# Patient Record
Sex: MALE | Employment: OTHER | URBAN - METROPOLITAN AREA
[De-identification: names, ages, dates, MRNs, and addresses within clinical notes are randomized per-mention and may not be internally consistent; named-entity substitution may affect disease eponyms.]

---

## 2017-01-03 ENCOUNTER — AMBULATORY - HEALTHEAST (OUTPATIENT)
Dept: CARDIAC REHAB | Facility: CLINIC | Age: 67
End: 2017-01-03

## 2017-01-03 DIAGNOSIS — I21.4 NON-ST ELEVATION MI (NSTEMI) (H): ICD-10-CM

## 2017-01-03 DIAGNOSIS — I21.02 ST ELEVATION MYOCARDIAL INFARCTION INVOLVING LEFT ANTERIOR DESCENDING (LAD) CORONARY ARTERY (H): ICD-10-CM

## 2017-01-03 DIAGNOSIS — I42.2 HYPERTROPHIC CARDIOMYOPATHY (H): ICD-10-CM

## 2017-01-03 DIAGNOSIS — I50.32 CHRONIC DIASTOLIC CONGESTIVE HEART FAILURE (H): ICD-10-CM

## 2017-01-03 DIAGNOSIS — Z95.5 STENTED CORONARY ARTERY: ICD-10-CM

## 2017-01-05 ENCOUNTER — AMBULATORY - HEALTHEAST (OUTPATIENT)
Dept: CARDIAC REHAB | Facility: CLINIC | Age: 67
End: 2017-01-05

## 2017-01-05 DIAGNOSIS — I50.32 CHRONIC DIASTOLIC CONGESTIVE HEART FAILURE (H): ICD-10-CM

## 2017-01-05 DIAGNOSIS — Z95.5 STENTED CORONARY ARTERY: ICD-10-CM

## 2017-01-05 DIAGNOSIS — I21.4 NON-ST ELEVATION MI (NSTEMI) (H): ICD-10-CM

## 2017-01-10 ENCOUNTER — AMBULATORY - HEALTHEAST (OUTPATIENT)
Dept: CARDIAC REHAB | Facility: CLINIC | Age: 67
End: 2017-01-10

## 2017-01-10 DIAGNOSIS — I50.32 CHRONIC DIASTOLIC CONGESTIVE HEART FAILURE (H): ICD-10-CM

## 2017-01-10 DIAGNOSIS — I21.4 NON-ST ELEVATION MI (NSTEMI) (H): ICD-10-CM

## 2017-01-10 DIAGNOSIS — Z95.5 STENTED CORONARY ARTERY: ICD-10-CM

## 2017-01-12 ENCOUNTER — AMBULATORY - HEALTHEAST (OUTPATIENT)
Dept: CARDIAC REHAB | Facility: CLINIC | Age: 67
End: 2017-01-12

## 2017-01-12 DIAGNOSIS — I21.4 NON-ST ELEVATION MI (NSTEMI) (H): ICD-10-CM

## 2017-01-12 DIAGNOSIS — Z95.5 STENTED CORONARY ARTERY: ICD-10-CM

## 2017-01-12 DIAGNOSIS — I50.32 CHRONIC DIASTOLIC CONGESTIVE HEART FAILURE (H): ICD-10-CM

## 2017-01-17 ENCOUNTER — AMBULATORY - HEALTHEAST (OUTPATIENT)
Dept: CARDIAC REHAB | Facility: CLINIC | Age: 67
End: 2017-01-17

## 2017-01-17 DIAGNOSIS — Z95.5 STENTED CORONARY ARTERY: ICD-10-CM

## 2017-01-17 DIAGNOSIS — I21.4 NON-ST ELEVATION MI (NSTEMI) (H): ICD-10-CM

## 2017-01-17 DIAGNOSIS — I50.32 CHRONIC DIASTOLIC CONGESTIVE HEART FAILURE (H): ICD-10-CM

## 2017-01-19 ENCOUNTER — AMBULATORY - HEALTHEAST (OUTPATIENT)
Dept: CARDIAC REHAB | Facility: CLINIC | Age: 67
End: 2017-01-19

## 2017-01-19 DIAGNOSIS — I42.2 HYPERTROPHIC CARDIOMYOPATHY (H): ICD-10-CM

## 2017-01-19 DIAGNOSIS — I21.4 NON-ST ELEVATION MI (NSTEMI) (H): ICD-10-CM

## 2017-01-19 DIAGNOSIS — Z95.5 STENTED CORONARY ARTERY: ICD-10-CM

## 2017-01-24 ENCOUNTER — AMBULATORY - HEALTHEAST (OUTPATIENT)
Dept: CARDIAC REHAB | Facility: CLINIC | Age: 67
End: 2017-01-24

## 2017-01-24 DIAGNOSIS — I50.32 CHRONIC DIASTOLIC CONGESTIVE HEART FAILURE (H): ICD-10-CM

## 2017-01-24 DIAGNOSIS — I42.2 HYPERTROPHIC CARDIOMYOPATHY (H): ICD-10-CM

## 2017-01-24 DIAGNOSIS — I21.4 NON-ST ELEVATION MI (NSTEMI) (H): ICD-10-CM

## 2017-01-24 DIAGNOSIS — Z95.5 STENTED CORONARY ARTERY: ICD-10-CM

## 2017-01-24 DIAGNOSIS — I21.02 ST ELEVATION MYOCARDIAL INFARCTION INVOLVING LEFT ANTERIOR DESCENDING (LAD) CORONARY ARTERY (H): ICD-10-CM

## 2017-01-26 ENCOUNTER — AMBULATORY - HEALTHEAST (OUTPATIENT)
Dept: CARDIAC REHAB | Facility: CLINIC | Age: 67
End: 2017-01-26

## 2017-01-26 DIAGNOSIS — I50.32 CHRONIC DIASTOLIC CONGESTIVE HEART FAILURE (H): ICD-10-CM

## 2017-01-26 DIAGNOSIS — I21.4 NON-ST ELEVATION MI (NSTEMI) (H): ICD-10-CM

## 2017-01-26 DIAGNOSIS — I42.2 HYPERTROPHIC CARDIOMYOPATHY (H): ICD-10-CM

## 2017-01-26 DIAGNOSIS — Z95.5 STENTED CORONARY ARTERY: ICD-10-CM

## 2017-01-31 ENCOUNTER — AMBULATORY - HEALTHEAST (OUTPATIENT)
Dept: CARDIAC REHAB | Facility: CLINIC | Age: 67
End: 2017-01-31

## 2017-01-31 DIAGNOSIS — I21.02 ST ELEVATION MYOCARDIAL INFARCTION INVOLVING LEFT ANTERIOR DESCENDING (LAD) CORONARY ARTERY (H): ICD-10-CM

## 2017-01-31 DIAGNOSIS — I42.2 HYPERTROPHIC CARDIOMYOPATHY (H): ICD-10-CM

## 2017-01-31 DIAGNOSIS — I21.4 NON-ST ELEVATION MI (NSTEMI) (H): ICD-10-CM

## 2017-01-31 DIAGNOSIS — Z95.5 STENTED CORONARY ARTERY: ICD-10-CM

## 2017-01-31 DIAGNOSIS — I50.32 CHRONIC DIASTOLIC CONGESTIVE HEART FAILURE (H): ICD-10-CM

## 2017-02-02 ENCOUNTER — AMBULATORY - HEALTHEAST (OUTPATIENT)
Dept: CARDIAC REHAB | Facility: CLINIC | Age: 67
End: 2017-02-02

## 2017-02-02 DIAGNOSIS — Z95.5 STENTED CORONARY ARTERY: ICD-10-CM

## 2017-02-02 DIAGNOSIS — I21.4 NON-ST ELEVATION MI (NSTEMI) (H): ICD-10-CM

## 2017-02-02 DIAGNOSIS — I50.32 CHRONIC DIASTOLIC CONGESTIVE HEART FAILURE (H): ICD-10-CM

## 2017-02-07 ENCOUNTER — AMBULATORY - HEALTHEAST (OUTPATIENT)
Dept: CARDIAC REHAB | Facility: CLINIC | Age: 67
End: 2017-02-07

## 2017-02-07 DIAGNOSIS — I50.32 CHRONIC DIASTOLIC CONGESTIVE HEART FAILURE (H): ICD-10-CM

## 2017-02-07 DIAGNOSIS — I42.2 HYPERTROPHIC CARDIOMYOPATHY (H): ICD-10-CM

## 2017-02-07 DIAGNOSIS — Z95.5 STENTED CORONARY ARTERY: ICD-10-CM

## 2017-02-07 DIAGNOSIS — I21.4 NON-ST ELEVATION MI (NSTEMI) (H): ICD-10-CM

## 2017-02-09 ENCOUNTER — AMBULATORY - HEALTHEAST (OUTPATIENT)
Dept: CARDIAC REHAB | Facility: CLINIC | Age: 67
End: 2017-02-09

## 2017-02-09 DIAGNOSIS — I21.4 NON-ST ELEVATION MI (NSTEMI) (H): ICD-10-CM

## 2017-02-09 DIAGNOSIS — Z95.5 STENTED CORONARY ARTERY: ICD-10-CM

## 2017-02-09 DIAGNOSIS — I50.32 CHRONIC DIASTOLIC CONGESTIVE HEART FAILURE (H): ICD-10-CM

## 2017-02-14 ENCOUNTER — AMBULATORY - HEALTHEAST (OUTPATIENT)
Dept: CARDIAC REHAB | Facility: CLINIC | Age: 67
End: 2017-02-14

## 2017-02-14 DIAGNOSIS — I42.2 HYPERTROPHIC CARDIOMYOPATHY (H): ICD-10-CM

## 2017-02-14 DIAGNOSIS — I50.32 CHRONIC DIASTOLIC CONGESTIVE HEART FAILURE (H): ICD-10-CM

## 2017-02-14 DIAGNOSIS — Z95.5 STENTED CORONARY ARTERY: ICD-10-CM

## 2017-02-14 DIAGNOSIS — I21.4 NON-ST ELEVATION MI (NSTEMI) (H): ICD-10-CM

## 2017-02-14 DIAGNOSIS — I21.02 ST ELEVATION MYOCARDIAL INFARCTION INVOLVING LEFT ANTERIOR DESCENDING (LAD) CORONARY ARTERY (H): ICD-10-CM

## 2017-02-16 ENCOUNTER — AMBULATORY - HEALTHEAST (OUTPATIENT)
Dept: CARDIAC REHAB | Facility: CLINIC | Age: 67
End: 2017-02-16

## 2017-02-16 DIAGNOSIS — I50.32 CHRONIC DIASTOLIC CONGESTIVE HEART FAILURE (H): ICD-10-CM

## 2017-02-16 DIAGNOSIS — I21.4 NON-ST ELEVATION MI (NSTEMI) (H): ICD-10-CM

## 2017-02-16 DIAGNOSIS — Z95.5 STENTED CORONARY ARTERY: ICD-10-CM

## 2017-02-16 DIAGNOSIS — I42.2 HYPERTROPHIC CARDIOMYOPATHY (H): ICD-10-CM

## 2017-02-21 ENCOUNTER — AMBULATORY - HEALTHEAST (OUTPATIENT)
Dept: CARDIAC REHAB | Facility: CLINIC | Age: 67
End: 2017-02-21

## 2017-02-21 DIAGNOSIS — I21.4 NON-ST ELEVATION MI (NSTEMI) (H): ICD-10-CM

## 2017-02-21 DIAGNOSIS — Z95.5 STENTED CORONARY ARTERY: ICD-10-CM

## 2017-02-21 DIAGNOSIS — I50.32 CHRONIC DIASTOLIC CONGESTIVE HEART FAILURE (H): ICD-10-CM

## 2017-02-23 ENCOUNTER — AMBULATORY - HEALTHEAST (OUTPATIENT)
Dept: CARDIAC REHAB | Facility: CLINIC | Age: 67
End: 2017-02-23

## 2017-02-23 DIAGNOSIS — I21.4 NON-ST ELEVATION MI (NSTEMI) (H): ICD-10-CM

## 2017-02-23 DIAGNOSIS — Z95.5 STENTED CORONARY ARTERY: ICD-10-CM

## 2017-02-28 ENCOUNTER — AMBULATORY - HEALTHEAST (OUTPATIENT)
Dept: CARDIAC REHAB | Facility: CLINIC | Age: 67
End: 2017-02-28

## 2017-02-28 DIAGNOSIS — I21.4 NON-ST ELEVATION MI (NSTEMI) (H): ICD-10-CM

## 2017-02-28 DIAGNOSIS — Z95.5 STENTED CORONARY ARTERY: ICD-10-CM

## 2017-02-28 DIAGNOSIS — I50.32 CHRONIC DIASTOLIC CONGESTIVE HEART FAILURE (H): ICD-10-CM

## 2017-02-28 DIAGNOSIS — I42.2 HYPERTROPHIC CARDIOMYOPATHY (H): ICD-10-CM

## 2018-02-16 ENCOUNTER — AMBULATORY - HEALTHEAST (OUTPATIENT)
Dept: CARDIAC REHAB | Facility: CLINIC | Age: 68
End: 2018-02-16

## 2018-02-16 DIAGNOSIS — Z95.5 STENTED CORONARY ARTERY: ICD-10-CM

## 2018-03-09 ENCOUNTER — COMMUNICATION - HEALTHEAST (OUTPATIENT)
Dept: TELEHEALTH | Facility: CLINIC | Age: 68
End: 2018-03-09

## 2018-03-09 ENCOUNTER — AMBULATORY - HEALTHEAST (OUTPATIENT)
Dept: CARDIAC REHAB | Facility: CLINIC | Age: 68
End: 2018-03-09

## 2018-03-09 DIAGNOSIS — Z95.5 S/P DRUG ELUTING CORONARY STENT PLACEMENT: ICD-10-CM

## 2018-03-09 DIAGNOSIS — Z95.5 STENTED CORONARY ARTERY: ICD-10-CM

## 2018-03-13 ENCOUNTER — AMBULATORY - HEALTHEAST (OUTPATIENT)
Dept: CARDIAC REHAB | Facility: CLINIC | Age: 68
End: 2018-03-13

## 2018-03-13 DIAGNOSIS — Z95.5 S/P DRUG ELUTING CORONARY STENT PLACEMENT: ICD-10-CM

## 2018-03-13 DIAGNOSIS — I50.30 DIASTOLIC CHF (H): ICD-10-CM

## 2018-03-15 ENCOUNTER — AMBULATORY - HEALTHEAST (OUTPATIENT)
Dept: CARDIAC REHAB | Facility: CLINIC | Age: 68
End: 2018-03-15

## 2018-03-15 DIAGNOSIS — Z95.5 S/P DRUG ELUTING CORONARY STENT PLACEMENT: ICD-10-CM

## 2018-03-27 ENCOUNTER — AMBULATORY - HEALTHEAST (OUTPATIENT)
Dept: CARDIAC REHAB | Facility: CLINIC | Age: 68
End: 2018-03-27

## 2018-03-27 DIAGNOSIS — Z95.5 S/P DRUG ELUTING CORONARY STENT PLACEMENT: ICD-10-CM

## 2018-03-27 DIAGNOSIS — I21.02 ST ELEVATION MYOCARDIAL INFARCTION INVOLVING LEFT ANTERIOR DESCENDING (LAD) CORONARY ARTERY (H): ICD-10-CM

## 2018-03-29 ENCOUNTER — AMBULATORY - HEALTHEAST (OUTPATIENT)
Dept: CARDIAC REHAB | Facility: CLINIC | Age: 68
End: 2018-03-29

## 2018-03-29 DIAGNOSIS — Z95.5 S/P DRUG ELUTING CORONARY STENT PLACEMENT: ICD-10-CM

## 2018-04-03 ENCOUNTER — AMBULATORY - HEALTHEAST (OUTPATIENT)
Dept: CARDIAC REHAB | Facility: CLINIC | Age: 68
End: 2018-04-03

## 2018-04-03 DIAGNOSIS — Z95.5 S/P DRUG ELUTING CORONARY STENT PLACEMENT: ICD-10-CM

## 2019-09-29 ENCOUNTER — HEALTH MAINTENANCE LETTER (OUTPATIENT)
Age: 69
End: 2019-09-29

## 2019-11-04 ENCOUNTER — COMMUNICATION - HEALTHEAST (OUTPATIENT)
Dept: GERIATRICS | Facility: CLINIC | Age: 69
End: 2019-11-04

## 2019-11-04 ENCOUNTER — OFFICE VISIT - HEALTHEAST (OUTPATIENT)
Dept: GERIATRICS | Facility: CLINIC | Age: 69
End: 2019-11-04

## 2019-11-04 ENCOUNTER — AMBULATORY - HEALTHEAST (OUTPATIENT)
Dept: GERIATRICS | Facility: CLINIC | Age: 69
End: 2019-11-04

## 2019-11-04 ENCOUNTER — RECORDS - HEALTHEAST (OUTPATIENT)
Dept: LAB | Facility: CLINIC | Age: 69
End: 2019-11-04

## 2019-11-04 DIAGNOSIS — F41.8 DEPRESSION WITH ANXIETY: ICD-10-CM
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
## 2019-11-04 DIAGNOSIS — E83.42 HYPOMAGNESEMIA: ICD-10-CM

## 2019-11-04 DIAGNOSIS — I50.33 ACUTE ON CHRONIC HEART FAILURE WITH PRESERVED EJECTION FRACTION (H): ICD-10-CM

## 2019-11-04 DIAGNOSIS — T81.49XA WOUND INFECTION AFTER SURGERY: ICD-10-CM

## 2019-11-04 DIAGNOSIS — E87.6 HYPOKALEMIA: ICD-10-CM

## 2019-11-04 DIAGNOSIS — I48.21 PERMANENT ATRIAL FIBRILLATION (H): ICD-10-CM

## 2019-11-04 DIAGNOSIS — M1A.09X0 CHRONIC GOUT OF MULTIPLE SITES, UNSPECIFIED CAUSE: ICD-10-CM

## 2019-11-04 DIAGNOSIS — N18.30 TYPE 2 DIABETES MELLITUS WITH STAGE 3 CHRONIC KIDNEY DISEASE, WITHOUT LONG-TERM CURRENT USE OF INSULIN (H): ICD-10-CM

## 2019-11-04 DIAGNOSIS — G47.33 OSA (OBSTRUCTIVE SLEEP APNEA): ICD-10-CM

## 2019-11-04 DIAGNOSIS — R60.0 BILATERAL EDEMA OF LOWER EXTREMITY: ICD-10-CM

## 2019-11-04 DIAGNOSIS — E78.49 OTHER HYPERLIPIDEMIA: ICD-10-CM

## 2019-11-04 DIAGNOSIS — E66.09 CLASS 1 OBESITY DUE TO EXCESS CALORIES WITH SERIOUS COMORBIDITY AND BODY MASS INDEX (BMI) OF 34.0 TO 34.9 IN ADULT: ICD-10-CM

## 2019-11-04 DIAGNOSIS — E11.22 TYPE 2 DIABETES MELLITUS WITH STAGE 3 CHRONIC KIDNEY DISEASE, WITHOUT LONG-TERM CURRENT USE OF INSULIN (H): ICD-10-CM

## 2019-11-04 DIAGNOSIS — N18.30 CRD (CHRONIC RENAL DISEASE), STAGE III (H): ICD-10-CM

## 2019-11-04 DIAGNOSIS — Z95.5 STENTED CORONARY ARTERY: ICD-10-CM

## 2019-11-04 DIAGNOSIS — K21.9 GASTROESOPHAGEAL REFLUX DISEASE WITHOUT ESOPHAGITIS: ICD-10-CM

## 2019-11-04 DIAGNOSIS — I12.9 BENIGN HYPERTENSION WITH CHRONIC KIDNEY DISEASE, STAGE III (H): ICD-10-CM

## 2019-11-04 DIAGNOSIS — S22.079D CLOSED FRACTURE OF NINTH THORACIC VERTEBRA WITH ROUTINE HEALING, UNSPECIFIED FRACTURE MORPHOLOGY, SUBSEQUENT ENCOUNTER: ICD-10-CM

## 2019-11-04 DIAGNOSIS — N18.30 BENIGN HYPERTENSION WITH CHRONIC KIDNEY DISEASE, STAGE III (H): ICD-10-CM

## 2019-11-04 DIAGNOSIS — I50.21 ACUTE SYSTOLIC HEART FAILURE (H): ICD-10-CM

## 2019-11-04 DIAGNOSIS — R53.81 PHYSICAL DECONDITIONING: ICD-10-CM

## 2019-11-04 DIAGNOSIS — I25.118 ATHEROSCLEROSIS OF NATIVE CORONARY ARTERY OF NATIVE HEART WITH STABLE ANGINA PECTORIS (H): ICD-10-CM

## 2019-11-04 DIAGNOSIS — E66.811 CLASS 1 OBESITY DUE TO EXCESS CALORIES WITH SERIOUS COMORBIDITY AND BODY MASS INDEX (BMI) OF 34.0 TO 34.9 IN ADULT: ICD-10-CM

## 2019-11-04 LAB
ALP SERPL-CCNC: 131 U/L (ref 45–120)
ALT SERPL W P-5'-P-CCNC: <9 U/L (ref 0–45)
AST SERPL W P-5'-P-CCNC: 9 U/L (ref 0–40)
BASOPHILS # BLD AUTO: 0 THOU/UL (ref 0–0.2)
BASOPHILS NFR BLD AUTO: 1 % (ref 0–2)
BILIRUB DIRECT SERPL-MCNC: 0.2 MG/DL
BILIRUB SERPL-MCNC: 0.4 MG/DL (ref 0–1)
BUN SERPL-MCNC: 12 MG/DL (ref 8–22)
C REACTIVE PROTEIN LHE: 6.6 MG/DL (ref 0–0.8)
CREAT SERPL-MCNC: 0.84 MG/DL (ref 0.7–1.3)
EOSINOPHIL # BLD AUTO: 0.2 THOU/UL (ref 0–0.4)
EOSINOPHIL NFR BLD AUTO: 3 % (ref 0–6)
ERYTHROCYTE [DISTWIDTH] IN BLOOD BY AUTOMATED COUNT: 16.1 % (ref 11–14.5)
GFR SERPL CREATININE-BSD FRML MDRD: >60 ML/MIN/1.73M2
HCT VFR BLD AUTO: 32.9 % (ref 40–54)
HGB BLD-MCNC: 9.6 G/DL (ref 14–18)
LYMPHOCYTES # BLD AUTO: 1.2 THOU/UL (ref 0.8–4.4)
LYMPHOCYTES NFR BLD AUTO: 19 % (ref 20–40)
MCH RBC QN AUTO: 26.9 PG (ref 27–34)
MCHC RBC AUTO-ENTMCNC: 29.2 G/DL (ref 32–36)
MCV RBC AUTO: 92 FL (ref 80–100)
MONOCYTES # BLD AUTO: 0.7 THOU/UL (ref 0–0.9)
MONOCYTES NFR BLD AUTO: 10 % (ref 2–10)
NEUTROPHILS # BLD AUTO: 4.5 THOU/UL (ref 2–7.7)
NEUTROPHILS NFR BLD AUTO: 67 % (ref 50–70)
PLATELET # BLD AUTO: 284 THOU/UL (ref 140–440)
PMV BLD AUTO: 10.7 FL (ref 8.5–12.5)
RBC # BLD AUTO: 3.57 MILL/UL (ref 4.4–6.2)
WBC: 6.7 THOU/UL (ref 4–11)

## 2019-11-05 ENCOUNTER — RECORDS - HEALTHEAST (OUTPATIENT)
Dept: LAB | Facility: CLINIC | Age: 69
End: 2019-11-05

## 2019-11-05 LAB
ANION GAP SERPL CALCULATED.3IONS-SCNC: 9 MMOL/L (ref 5–18)
BASOPHILS # BLD AUTO: 0.1 THOU/UL (ref 0–0.2)
BASOPHILS NFR BLD AUTO: 1 % (ref 0–2)
BUN SERPL-MCNC: 11 MG/DL (ref 8–22)
C REACTIVE PROTEIN LHE: 5.5 MG/DL (ref 0–0.8)
CALCIUM SERPL-MCNC: 9.9 MG/DL (ref 8.5–10.5)
CHLORIDE BLD-SCNC: 100 MMOL/L (ref 98–107)
CO2 SERPL-SCNC: 34 MMOL/L (ref 22–31)
CREAT SERPL-MCNC: 0.93 MG/DL (ref 0.7–1.3)
DIGOXIN LEVEL LHE- HISTORICAL: 0.5 NG/ML (ref 0.5–2)
EOSINOPHIL # BLD AUTO: 0.2 THOU/UL (ref 0–0.4)
EOSINOPHIL NFR BLD AUTO: 3 % (ref 0–6)
ERYTHROCYTE [DISTWIDTH] IN BLOOD BY AUTOMATED COUNT: 16.1 % (ref 11–14.5)
GFR SERPL CREATININE-BSD FRML MDRD: >60 ML/MIN/1.73M2
GLUCOSE BLD-MCNC: 90 MG/DL (ref 70–125)
HCT VFR BLD AUTO: 35.6 % (ref 40–54)
HGB BLD-MCNC: 10.3 G/DL (ref 14–18)
INR PPP: 1.08 (ref 0.9–1.1)
LYMPHOCYTES # BLD AUTO: 1.5 THOU/UL (ref 0.8–4.4)
LYMPHOCYTES NFR BLD AUTO: 22 % (ref 20–40)
MAGNESIUM SERPL-MCNC: 1.9 MG/DL (ref 1.8–2.6)
MCH RBC QN AUTO: 26.9 PG (ref 27–34)
MCHC RBC AUTO-ENTMCNC: 28.9 G/DL (ref 32–36)
MCV RBC AUTO: 93 FL (ref 80–100)
MONOCYTES # BLD AUTO: 0.7 THOU/UL (ref 0–0.9)
MONOCYTES NFR BLD AUTO: 11 % (ref 2–10)
NEUTROPHILS # BLD AUTO: 4.3 THOU/UL (ref 2–7.7)
NEUTROPHILS NFR BLD AUTO: 63 % (ref 50–70)
PLATELET # BLD AUTO: 303 THOU/UL (ref 140–440)
PMV BLD AUTO: 10.6 FL (ref 8.5–12.5)
POTASSIUM BLD-SCNC: 3.9 MMOL/L (ref 3.5–5)
RBC # BLD AUTO: 3.83 MILL/UL (ref 4.4–6.2)
SODIUM SERPL-SCNC: 143 MMOL/L (ref 136–145)
WBC: 6.8 THOU/UL (ref 4–11)

## 2019-11-06 LAB — 25(OH)D3 SERPL-MCNC: 31.6 NG/ML (ref 30–80)

## 2019-11-07 ENCOUNTER — OFFICE VISIT - HEALTHEAST (OUTPATIENT)
Dept: GERIATRICS | Facility: CLINIC | Age: 69
End: 2019-11-07

## 2019-11-07 DIAGNOSIS — N18.30 BENIGN HYPERTENSION WITH CHRONIC KIDNEY DISEASE, STAGE III (H): ICD-10-CM

## 2019-11-07 DIAGNOSIS — I50.33 ACUTE ON CHRONIC HEART FAILURE WITH PRESERVED EJECTION FRACTION (H): ICD-10-CM

## 2019-11-07 DIAGNOSIS — E11.22 TYPE 2 DIABETES MELLITUS WITH STAGE 3 CHRONIC KIDNEY DISEASE, WITHOUT LONG-TERM CURRENT USE OF INSULIN (H): ICD-10-CM

## 2019-11-07 DIAGNOSIS — F41.8 DEPRESSION WITH ANXIETY: ICD-10-CM

## 2019-11-07 DIAGNOSIS — E66.09 CLASS 1 OBESITY DUE TO EXCESS CALORIES WITH SERIOUS COMORBIDITY AND BODY MASS INDEX (BMI) OF 34.0 TO 34.9 IN ADULT: ICD-10-CM

## 2019-11-07 DIAGNOSIS — Z98.1 STATUS POST THORACIC SPINAL FUSION: ICD-10-CM

## 2019-11-07 DIAGNOSIS — Z95.5 STENTED CORONARY ARTERY: ICD-10-CM

## 2019-11-07 DIAGNOSIS — R29.6 FALLS FREQUENTLY: ICD-10-CM

## 2019-11-07 DIAGNOSIS — T81.49XA POSTOPERATIVE WOUND INFECTION: ICD-10-CM

## 2019-11-07 DIAGNOSIS — I48.21 PERMANENT ATRIAL FIBRILLATION (H): ICD-10-CM

## 2019-11-07 DIAGNOSIS — N18.30 TYPE 2 DIABETES MELLITUS WITH STAGE 3 CHRONIC KIDNEY DISEASE, WITHOUT LONG-TERM CURRENT USE OF INSULIN (H): ICD-10-CM

## 2019-11-07 DIAGNOSIS — I12.9 BENIGN HYPERTENSION WITH CHRONIC KIDNEY DISEASE, STAGE III (H): ICD-10-CM

## 2019-11-07 DIAGNOSIS — E66.811 CLASS 1 OBESITY DUE TO EXCESS CALORIES WITH SERIOUS COMORBIDITY AND BODY MASS INDEX (BMI) OF 34.0 TO 34.9 IN ADULT: ICD-10-CM

## 2019-11-07 DIAGNOSIS — I89.0 LYMPHEDEMA OF BOTH LOWER EXTREMITIES: ICD-10-CM

## 2019-11-08 ENCOUNTER — RECORDS - HEALTHEAST (OUTPATIENT)
Dept: LAB | Facility: CLINIC | Age: 69
End: 2019-11-08

## 2019-11-10 ENCOUNTER — RECORDS - HEALTHEAST (OUTPATIENT)
Dept: LAB | Facility: CLINIC | Age: 69
End: 2019-11-10

## 2019-11-11 ENCOUNTER — RECORDS - HEALTHEAST (OUTPATIENT)
Dept: LAB | Facility: CLINIC | Age: 69
End: 2019-11-11

## 2019-11-11 ENCOUNTER — OFFICE VISIT - HEALTHEAST (OUTPATIENT)
Dept: GERIATRICS | Facility: CLINIC | Age: 69
End: 2019-11-11

## 2019-11-11 DIAGNOSIS — E11.22 TYPE 2 DIABETES MELLITUS WITH STAGE 3 CHRONIC KIDNEY DISEASE, WITHOUT LONG-TERM CURRENT USE OF INSULIN (H): ICD-10-CM

## 2019-11-11 DIAGNOSIS — N18.30 TYPE 2 DIABETES MELLITUS WITH STAGE 3 CHRONIC KIDNEY DISEASE, WITHOUT LONG-TERM CURRENT USE OF INSULIN (H): ICD-10-CM

## 2019-11-11 DIAGNOSIS — I48.21 PERMANENT ATRIAL FIBRILLATION (H): ICD-10-CM

## 2019-11-11 DIAGNOSIS — N40.1 BPH WITH URINARY OBSTRUCTION: ICD-10-CM

## 2019-11-11 DIAGNOSIS — E78.49 OTHER HYPERLIPIDEMIA: ICD-10-CM

## 2019-11-11 DIAGNOSIS — T81.49XA WOUND INFECTION AFTER SURGERY: ICD-10-CM

## 2019-11-11 DIAGNOSIS — Z95.5 STENTED CORONARY ARTERY: ICD-10-CM

## 2019-11-11 DIAGNOSIS — E83.42 HYPOMAGNESEMIA: ICD-10-CM

## 2019-11-11 DIAGNOSIS — I25.118 ATHEROSCLEROSIS OF NATIVE CORONARY ARTERY OF NATIVE HEART WITH STABLE ANGINA PECTORIS (H): ICD-10-CM

## 2019-11-11 DIAGNOSIS — N18.30 CRD (CHRONIC RENAL DISEASE), STAGE III (H): ICD-10-CM

## 2019-11-11 DIAGNOSIS — I12.9 BENIGN HYPERTENSION WITH CHRONIC KIDNEY DISEASE, STAGE III (H): ICD-10-CM

## 2019-11-11 DIAGNOSIS — K21.9 GASTROESOPHAGEAL REFLUX DISEASE WITHOUT ESOPHAGITIS: ICD-10-CM

## 2019-11-11 DIAGNOSIS — R60.0 BILATERAL EDEMA OF LOWER EXTREMITY: ICD-10-CM

## 2019-11-11 DIAGNOSIS — F41.8 DEPRESSION WITH ANXIETY: ICD-10-CM

## 2019-11-11 DIAGNOSIS — N13.8 BPH WITH URINARY OBSTRUCTION: ICD-10-CM

## 2019-11-11 DIAGNOSIS — I89.0 LYMPHEDEMA OF BOTH LOWER EXTREMITIES: ICD-10-CM

## 2019-11-11 DIAGNOSIS — D63.8 ANEMIA OF CHRONIC DISEASE: ICD-10-CM

## 2019-11-11 DIAGNOSIS — I50.33 ACUTE ON CHRONIC HEART FAILURE WITH PRESERVED EJECTION FRACTION (H): ICD-10-CM
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
## 2019-11-11 DIAGNOSIS — R53.81 PHYSICAL DECONDITIONING: ICD-10-CM

## 2019-11-11 DIAGNOSIS — E66.811 CLASS 1 OBESITY DUE TO EXCESS CALORIES WITH SERIOUS COMORBIDITY AND BODY MASS INDEX (BMI) OF 34.0 TO 34.9 IN ADULT: ICD-10-CM

## 2019-11-11 DIAGNOSIS — L85.3 DRY SKIN: ICD-10-CM

## 2019-11-11 DIAGNOSIS — E87.6 HYPOKALEMIA: ICD-10-CM

## 2019-11-11 DIAGNOSIS — E66.09 CLASS 1 OBESITY DUE TO EXCESS CALORIES WITH SERIOUS COMORBIDITY AND BODY MASS INDEX (BMI) OF 34.0 TO 34.9 IN ADULT: ICD-10-CM

## 2019-11-11 DIAGNOSIS — N18.30 BENIGN HYPERTENSION WITH CHRONIC KIDNEY DISEASE, STAGE III (H): ICD-10-CM

## 2019-11-11 LAB
25(OH)D3 SERPL-MCNC: 41 NG/ML (ref 30–80)
ALP SERPL-CCNC: 131 U/L (ref 45–120)
ALT SERPL W P-5'-P-CCNC: <9 U/L (ref 0–45)
AST SERPL W P-5'-P-CCNC: 15 U/L (ref 0–40)
BASOPHILS # BLD AUTO: 0.1 THOU/UL (ref 0–0.2)
BASOPHILS NFR BLD AUTO: 1 % (ref 0–2)
BILIRUB DIRECT SERPL-MCNC: 0.2 MG/DL
BILIRUB SERPL-MCNC: 0.4 MG/DL (ref 0–1)
BUN SERPL-MCNC: 17 MG/DL (ref 8–22)
C REACTIVE PROTEIN LHE: 4.2 MG/DL (ref 0–0.8)
CREAT SERPL-MCNC: 1 MG/DL (ref 0.7–1.3)
DIGOXIN LEVEL LHE- HISTORICAL: 0.5 NG/ML (ref 0.5–2)
EOSINOPHIL # BLD AUTO: 0.3 THOU/UL (ref 0–0.4)
EOSINOPHIL NFR BLD AUTO: 5 % (ref 0–6)
ERYTHROCYTE [DISTWIDTH] IN BLOOD BY AUTOMATED COUNT: 16 % (ref 11–14.5)
GFR SERPL CREATININE-BSD FRML MDRD: >60 ML/MIN/1.73M2
HBA1C MFR BLD: 6 % (ref 4.2–6.1)
HCT VFR BLD AUTO: 37.5 % (ref 40–54)
HGB BLD-MCNC: 11.1 G/DL (ref 14–18)
LYMPHOCYTES # BLD AUTO: 1.4 THOU/UL (ref 0.8–4.4)
LYMPHOCYTES NFR BLD AUTO: 21 % (ref 20–40)
MAGNESIUM SERPL-MCNC: 1.8 MG/DL (ref 1.8–2.6)
MCH RBC QN AUTO: 27.1 PG (ref 27–34)
MCHC RBC AUTO-ENTMCNC: 29.6 G/DL (ref 32–36)
MCV RBC AUTO: 92 FL (ref 80–100)
MONOCYTES # BLD AUTO: 0.7 THOU/UL (ref 0–0.9)
MONOCYTES NFR BLD AUTO: 10 % (ref 2–10)
NEUTROPHILS # BLD AUTO: 4.1 THOU/UL (ref 2–7.7)
NEUTROPHILS NFR BLD AUTO: 62 % (ref 50–70)
PLATELET # BLD AUTO: 286 THOU/UL (ref 140–440)
PMV BLD AUTO: 11.1 FL (ref 8.5–12.5)
POTASSIUM BLD-SCNC: 3.9 MMOL/L (ref 3.5–5)
RBC # BLD AUTO: 4.09 MILL/UL (ref 4.4–6.2)
WBC: 6.5 THOU/UL (ref 4–11)

## 2019-11-13 ENCOUNTER — RECORDS - HEALTHEAST (OUTPATIENT)
Dept: LAB | Facility: CLINIC | Age: 69
End: 2019-11-13

## 2019-11-13 LAB — INR PPP: 1.09 (ref 0.9–1.1)

## 2019-11-14 ENCOUNTER — OFFICE VISIT - HEALTHEAST (OUTPATIENT)
Dept: GERIATRICS | Facility: CLINIC | Age: 69
End: 2019-11-14

## 2019-11-14 DIAGNOSIS — E83.42 HYPOMAGNESEMIA: ICD-10-CM
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
## 2019-11-14 DIAGNOSIS — I50.33 ACUTE ON CHRONIC HEART FAILURE WITH PRESERVED EJECTION FRACTION (H): ICD-10-CM

## 2019-11-14 DIAGNOSIS — I25.118 ATHEROSCLEROSIS OF NATIVE CORONARY ARTERY OF NATIVE HEART WITH STABLE ANGINA PECTORIS (H): ICD-10-CM

## 2019-11-14 DIAGNOSIS — N18.30 TYPE 2 DIABETES MELLITUS WITH STAGE 3 CHRONIC KIDNEY DISEASE, WITHOUT LONG-TERM CURRENT USE OF INSULIN (H): ICD-10-CM

## 2019-11-14 DIAGNOSIS — I12.9 BENIGN HYPERTENSION WITH CHRONIC KIDNEY DISEASE, STAGE III (H): ICD-10-CM

## 2019-11-14 DIAGNOSIS — N40.1 BPH WITH URINARY OBSTRUCTION: ICD-10-CM

## 2019-11-14 DIAGNOSIS — I48.21 PERMANENT ATRIAL FIBRILLATION (H): ICD-10-CM

## 2019-11-14 DIAGNOSIS — I89.0 LYMPHEDEMA OF BOTH LOWER EXTREMITIES: ICD-10-CM

## 2019-11-14 DIAGNOSIS — E78.49 OTHER HYPERLIPIDEMIA: ICD-10-CM

## 2019-11-14 DIAGNOSIS — N13.8 BPH WITH URINARY OBSTRUCTION: ICD-10-CM

## 2019-11-14 DIAGNOSIS — R60.0 BILATERAL EDEMA OF LOWER EXTREMITY: ICD-10-CM

## 2019-11-14 DIAGNOSIS — G47.33 OSA (OBSTRUCTIVE SLEEP APNEA): ICD-10-CM

## 2019-11-14 DIAGNOSIS — N18.30 BENIGN HYPERTENSION WITH CHRONIC KIDNEY DISEASE, STAGE III (H): ICD-10-CM

## 2019-11-14 DIAGNOSIS — F41.8 DEPRESSION WITH ANXIETY: ICD-10-CM

## 2019-11-14 DIAGNOSIS — N18.30 CRD (CHRONIC RENAL DISEASE), STAGE III (H): ICD-10-CM

## 2019-11-14 DIAGNOSIS — D63.8 ANEMIA OF CHRONIC DISEASE: ICD-10-CM

## 2019-11-14 DIAGNOSIS — R53.81 PHYSICAL DECONDITIONING: ICD-10-CM

## 2019-11-14 DIAGNOSIS — E11.22 TYPE 2 DIABETES MELLITUS WITH STAGE 3 CHRONIC KIDNEY DISEASE, WITHOUT LONG-TERM CURRENT USE OF INSULIN (H): ICD-10-CM

## 2019-11-15 ENCOUNTER — COMMUNICATION - HEALTHEAST (OUTPATIENT)
Dept: GERIATRICS | Facility: CLINIC | Age: 69
End: 2019-11-15

## 2019-11-15 ENCOUNTER — RECORDS - HEALTHEAST (OUTPATIENT)
Dept: LAB | Facility: CLINIC | Age: 69
End: 2019-11-15

## 2019-11-15 LAB
ALP SERPL-CCNC: 145 U/L (ref 45–120)
INR PPP: 1.32 (ref 0.9–1.1)

## 2019-11-16 LAB
ANION GAP SERPL CALCULATED.3IONS-SCNC: 18 MMOL/L (ref 5–18)
BUN SERPL-MCNC: 17 MG/DL (ref 8–22)
CALCIUM SERPL-MCNC: 9 MG/DL (ref 8.5–10.5)
CHLORIDE BLD-SCNC: 101 MMOL/L (ref 98–107)
CO2 SERPL-SCNC: 23 MMOL/L (ref 22–31)
CREAT SERPL-MCNC: 1 MG/DL (ref 0.7–1.3)
GFR SERPL CREATININE-BSD FRML MDRD: >60 ML/MIN/1.73M2
GLUCOSE BLD-MCNC: 79 MG/DL (ref 70–125)
POTASSIUM BLD-SCNC: 4.4 MMOL/L (ref 3.5–5)
SODIUM SERPL-SCNC: 142 MMOL/L (ref 136–145)

## 2019-11-17 LAB
AEROBIC BLOOD CULTURE BOTTLE: NO GROWTH
AEROBIC BLOOD CULTURE BOTTLE: NORMAL
ANAEROBIC BLOOD CULTURE BOTTLE: NO GROWTH
ANAEROBIC BLOOD CULTURE BOTTLE: NORMAL

## 2019-11-18 ENCOUNTER — RECORDS - HEALTHEAST (OUTPATIENT)
Dept: LAB | Facility: CLINIC | Age: 69
End: 2019-11-18

## 2019-11-18 ENCOUNTER — COMMUNICATION - HEALTHEAST (OUTPATIENT)
Dept: GERIATRICS | Facility: CLINIC | Age: 69
End: 2019-11-18

## 2019-11-18 LAB
ANION GAP SERPL CALCULATED.3IONS-SCNC: 10 MMOL/L (ref 5–18)
BASOPHILS # BLD AUTO: 0.1 THOU/UL (ref 0–0.2)
BASOPHILS NFR BLD AUTO: 1 % (ref 0–2)
BUN SERPL-MCNC: 23 MG/DL (ref 8–22)
C REACTIVE PROTEIN LHE: 6.6 MG/DL (ref 0–0.8)
CALCIUM SERPL-MCNC: 10.8 MG/DL (ref 8.5–10.5)
CHLORIDE BLD-SCNC: 96 MMOL/L (ref 98–107)
CO2 SERPL-SCNC: 35 MMOL/L (ref 22–31)
CREAT SERPL-MCNC: 1.28 MG/DL (ref 0.7–1.3)
EOSINOPHIL # BLD AUTO: 0.3 THOU/UL (ref 0–0.4)
EOSINOPHIL NFR BLD AUTO: 5 % (ref 0–6)
ERYTHROCYTE [DISTWIDTH] IN BLOOD BY AUTOMATED COUNT: 16.4 % (ref 11–14.5)
GFR SERPL CREATININE-BSD FRML MDRD: 56 ML/MIN/1.73M2
GLUCOSE BLD-MCNC: 91 MG/DL (ref 70–125)
HCT VFR BLD AUTO: 40.1 % (ref 40–54)
HGB BLD-MCNC: 11.8 G/DL (ref 14–18)
INR PPP: 1.9 (ref 0.9–1.1)
LYMPHOCYTES # BLD AUTO: 1.7 THOU/UL (ref 0.8–4.4)
LYMPHOCYTES NFR BLD AUTO: 29 % (ref 20–40)
MCH RBC QN AUTO: 27.1 PG (ref 27–34)
MCHC RBC AUTO-ENTMCNC: 29.4 G/DL (ref 32–36)
MCV RBC AUTO: 92 FL (ref 80–100)
MONOCYTES # BLD AUTO: 0.5 THOU/UL (ref 0–0.9)
MONOCYTES NFR BLD AUTO: 9 % (ref 2–10)
NEUTROPHILS # BLD AUTO: 3.3 THOU/UL (ref 2–7.7)
NEUTROPHILS NFR BLD AUTO: 56 % (ref 50–70)
PLATELET # BLD AUTO: 324 THOU/UL (ref 140–440)
PMV BLD AUTO: 11.4 FL (ref 8.5–12.5)
POTASSIUM BLD-SCNC: 4.2 MMOL/L (ref 3.5–5)
RBC # BLD AUTO: 4.35 MILL/UL (ref 4.4–6.2)
SODIUM SERPL-SCNC: 141 MMOL/L (ref 136–145)
WBC: 5.8 THOU/UL (ref 4–11)

## 2019-11-19 ENCOUNTER — OFFICE VISIT - HEALTHEAST (OUTPATIENT)
Dept: GERIATRICS | Facility: CLINIC | Age: 69
End: 2019-11-19

## 2019-11-19 ENCOUNTER — RECORDS - HEALTHEAST (OUTPATIENT)
Dept: LAB | Facility: CLINIC | Age: 69
End: 2019-11-19

## 2019-11-19 ENCOUNTER — COMMUNICATION - HEALTHEAST (OUTPATIENT)
Dept: GERIATRICS | Facility: CLINIC | Age: 69
End: 2019-11-19

## 2019-11-19 DIAGNOSIS — S22.079D CLOSED FRACTURE OF NINTH THORACIC VERTEBRA WITH ROUTINE HEALING, UNSPECIFIED FRACTURE MORPHOLOGY, SUBSEQUENT ENCOUNTER: ICD-10-CM

## 2019-11-19 DIAGNOSIS — F41.8 DEPRESSION WITH ANXIETY: ICD-10-CM

## 2019-11-19 DIAGNOSIS — I50.33 ACUTE ON CHRONIC HEART FAILURE WITH PRESERVED EJECTION FRACTION (H): ICD-10-CM

## 2019-11-19 DIAGNOSIS — K21.9 GASTROESOPHAGEAL REFLUX DISEASE WITHOUT ESOPHAGITIS: ICD-10-CM

## 2019-11-19 DIAGNOSIS — G47.33 OSA (OBSTRUCTIVE SLEEP APNEA): ICD-10-CM

## 2019-11-19 DIAGNOSIS — I25.118 ATHEROSCLEROSIS OF NATIVE CORONARY ARTERY OF NATIVE HEART WITH STABLE ANGINA PECTORIS (H): ICD-10-CM

## 2019-11-19 DIAGNOSIS — I48.21 PERMANENT ATRIAL FIBRILLATION (H): ICD-10-CM

## 2019-11-19 DIAGNOSIS — I89.0 LYMPHEDEMA OF BOTH LOWER EXTREMITIES: ICD-10-CM

## 2019-11-19 DIAGNOSIS — T81.49XA WOUND INFECTION AFTER SURGERY: ICD-10-CM

## 2019-11-19 DIAGNOSIS — G89.29 CHRONIC PAIN OF RIGHT KNEE: ICD-10-CM

## 2019-11-19 DIAGNOSIS — M25.561 CHRONIC PAIN OF RIGHT KNEE: ICD-10-CM

## 2019-11-19 DIAGNOSIS — N18.30 CRD (CHRONIC RENAL DISEASE), STAGE III (H): ICD-10-CM

## 2019-11-19 DIAGNOSIS — R53.81 PHYSICAL DECONDITIONING: ICD-10-CM
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
## 2019-11-19 DIAGNOSIS — E11.22 TYPE 2 DIABETES MELLITUS WITH STAGE 3 CHRONIC KIDNEY DISEASE, WITHOUT LONG-TERM CURRENT USE OF INSULIN (H): ICD-10-CM

## 2019-11-19 DIAGNOSIS — I12.9 BENIGN HYPERTENSION WITH CHRONIC KIDNEY DISEASE, STAGE III (H): ICD-10-CM

## 2019-11-19 DIAGNOSIS — S22.009A CLOSED FRACTURE OF THORACIC VERTEBRA (H): ICD-10-CM

## 2019-11-19 DIAGNOSIS — N18.30 TYPE 2 DIABETES MELLITUS WITH STAGE 3 CHRONIC KIDNEY DISEASE, WITHOUT LONG-TERM CURRENT USE OF INSULIN (H): ICD-10-CM

## 2019-11-19 DIAGNOSIS — N18.30 BENIGN HYPERTENSION WITH CHRONIC KIDNEY DISEASE, STAGE III (H): ICD-10-CM

## 2019-11-20 ENCOUNTER — COMMUNICATION - HEALTHEAST (OUTPATIENT)
Dept: GERIATRICS | Facility: CLINIC | Age: 69
End: 2019-11-20

## 2019-11-20 LAB — INR PPP: 2.43 (ref 0.9–1.1)

## 2019-11-21 ENCOUNTER — OFFICE VISIT - HEALTHEAST (OUTPATIENT)
Dept: GERIATRICS | Facility: CLINIC | Age: 69
End: 2019-11-21

## 2019-11-21 DIAGNOSIS — R60.0 BILATERAL EDEMA OF LOWER EXTREMITY: ICD-10-CM

## 2019-11-21 DIAGNOSIS — D63.8 ANEMIA OF CHRONIC DISEASE: ICD-10-CM

## 2019-11-21 DIAGNOSIS — G47.33 OSA (OBSTRUCTIVE SLEEP APNEA): ICD-10-CM

## 2019-11-21 DIAGNOSIS — I50.33 ACUTE ON CHRONIC HEART FAILURE WITH PRESERVED EJECTION FRACTION (H): ICD-10-CM
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
## 2019-11-21 DIAGNOSIS — E66.811 CLASS 1 OBESITY DUE TO EXCESS CALORIES WITH SERIOUS COMORBIDITY AND BODY MASS INDEX (BMI) OF 34.0 TO 34.9 IN ADULT: ICD-10-CM

## 2019-11-21 DIAGNOSIS — T81.49XA WOUND INFECTION AFTER SURGERY: ICD-10-CM

## 2019-11-21 DIAGNOSIS — N18.30 TYPE 2 DIABETES MELLITUS WITH STAGE 3 CHRONIC KIDNEY DISEASE, WITHOUT LONG-TERM CURRENT USE OF INSULIN (H): ICD-10-CM

## 2019-11-21 DIAGNOSIS — M25.561 CHRONIC PAIN OF RIGHT KNEE: ICD-10-CM

## 2019-11-21 DIAGNOSIS — F41.8 DEPRESSION WITH ANXIETY: ICD-10-CM

## 2019-11-21 DIAGNOSIS — I48.21 PERMANENT ATRIAL FIBRILLATION (H): ICD-10-CM

## 2019-11-21 DIAGNOSIS — I12.9 BENIGN HYPERTENSION WITH CHRONIC KIDNEY DISEASE, STAGE III (H): ICD-10-CM

## 2019-11-21 DIAGNOSIS — E78.49 OTHER HYPERLIPIDEMIA: ICD-10-CM

## 2019-11-21 DIAGNOSIS — G89.29 CHRONIC PAIN OF RIGHT KNEE: ICD-10-CM

## 2019-11-21 DIAGNOSIS — N18.30 BENIGN HYPERTENSION WITH CHRONIC KIDNEY DISEASE, STAGE III (H): ICD-10-CM

## 2019-11-21 DIAGNOSIS — Z95.5 STENTED CORONARY ARTERY: ICD-10-CM

## 2019-11-21 DIAGNOSIS — I25.118 ATHEROSCLEROSIS OF NATIVE CORONARY ARTERY OF NATIVE HEART WITH STABLE ANGINA PECTORIS (H): ICD-10-CM

## 2019-11-21 DIAGNOSIS — A40.9 SEPSIS DUE TO STREPTOCOCCUS SPECIES WITHOUT ACUTE ORGAN DYSFUNCTION (H): ICD-10-CM

## 2019-11-21 DIAGNOSIS — E66.09 CLASS 1 OBESITY DUE TO EXCESS CALORIES WITH SERIOUS COMORBIDITY AND BODY MASS INDEX (BMI) OF 34.0 TO 34.9 IN ADULT: ICD-10-CM

## 2019-11-21 DIAGNOSIS — N13.8 BPH WITH URINARY OBSTRUCTION: ICD-10-CM

## 2019-11-21 DIAGNOSIS — E11.22 TYPE 2 DIABETES MELLITUS WITH STAGE 3 CHRONIC KIDNEY DISEASE, WITHOUT LONG-TERM CURRENT USE OF INSULIN (H): ICD-10-CM

## 2019-11-21 DIAGNOSIS — N40.1 BPH WITH URINARY OBSTRUCTION: ICD-10-CM

## 2019-11-22 ENCOUNTER — AMBULATORY - HEALTHEAST (OUTPATIENT)
Dept: GERIATRICS | Facility: CLINIC | Age: 69
End: 2019-11-22

## 2020-03-15 ENCOUNTER — HEALTH MAINTENANCE LETTER (OUTPATIENT)
Age: 70
End: 2020-03-15

## 2021-01-14 ENCOUNTER — HEALTH MAINTENANCE LETTER (OUTPATIENT)
Age: 71
End: 2021-01-14

## 2021-05-29 ENCOUNTER — RECORDS - HEALTHEAST (OUTPATIENT)
Dept: ADMINISTRATIVE | Facility: CLINIC | Age: 71
End: 2021-05-29

## 2021-05-30 VITALS — WEIGHT: 256.2 LBS | BODY MASS INDEX: 37.29 KG/M2

## 2021-05-30 VITALS — BODY MASS INDEX: 37 KG/M2 | WEIGHT: 254.2 LBS

## 2021-05-30 VITALS — BODY MASS INDEX: 37.15 KG/M2 | WEIGHT: 255.2 LBS

## 2021-05-30 VITALS — BODY MASS INDEX: 37.52 KG/M2 | WEIGHT: 257.8 LBS

## 2021-05-30 VITALS — WEIGHT: 257.2 LBS | BODY MASS INDEX: 37.44 KG/M2

## 2021-05-30 VITALS — BODY MASS INDEX: 37.82 KG/M2 | WEIGHT: 259.8 LBS

## 2021-05-30 VITALS — WEIGHT: 256 LBS | BODY MASS INDEX: 37.26 KG/M2

## 2021-05-30 VITALS — WEIGHT: 256.8 LBS | BODY MASS INDEX: 37.38 KG/M2

## 2021-05-30 VITALS — WEIGHT: 252.8 LBS | BODY MASS INDEX: 36.8 KG/M2

## 2021-05-30 VITALS — BODY MASS INDEX: 37.03 KG/M2 | WEIGHT: 254.4 LBS

## 2021-05-30 VITALS — BODY MASS INDEX: 37.38 KG/M2 | WEIGHT: 256.8 LBS

## 2021-05-30 VITALS — WEIGHT: 256.4 LBS | BODY MASS INDEX: 37.32 KG/M2

## 2021-05-30 VITALS — BODY MASS INDEX: 37.29 KG/M2 | WEIGHT: 256.2 LBS

## 2021-05-30 VITALS — BODY MASS INDEX: 36.94 KG/M2 | WEIGHT: 253.8 LBS

## 2021-05-30 VITALS — WEIGHT: 257 LBS | BODY MASS INDEX: 37.41 KG/M2

## 2021-05-30 VITALS — BODY MASS INDEX: 37.79 KG/M2 | WEIGHT: 259.6 LBS

## 2021-05-31 VITALS — WEIGHT: 258.6 LBS

## 2021-05-31 VITALS — WEIGHT: 258.8 LBS

## 2021-05-31 VITALS — WEIGHT: 257 LBS

## 2021-06-01 VITALS — WEIGHT: 259 LBS

## 2021-06-01 VITALS — WEIGHT: 260 LBS

## 2021-06-01 VITALS — WEIGHT: 253.2 LBS

## 2021-06-02 NOTE — PROGRESS NOTES
[facility]    DATE: 2019    NAME:  Noe Mason             :  1950  MRN: [id number]  CODE STATUS:  FULL CODE    VISIT TYPE: Problem Visit (hospital f/u)     FACILITY:  [facility] [[id number]]       CHIEF COMPLAIN/REASON FOR VISIT:    Chief Complaint   Patient presents with     Problem Visit     hospital f/u               HISTORY OF PRESENT ILLNESS: Noe Mason is a 69 y.o. male who was admitted  for T8-T11 spinal fusion due to unstable T9-10 fracture after multiple falls. He then presented to ED at [facility] on 10/28 for wound infection. He underwent I&D on 10/29 and cultures grew MSSA. ID consulted and changed vanco to cefazolin. This was recommended for 6 weeks. He had post op anemia but did not require any blood transfusions. He had venous dopplers of ble that were negative for dvt. F/u with ID in 2 weeks. He was recommended to transfer to TCU for further rehab. He has PMH of frequent falls, CAD s/p stenting, Diastolic CHF, A fib on chronic warfarin, renal infarction s/p emboli, seropositive RA, Gout, MARYELLEN, BPH, urinary retention, depression and anxiety. Prior to this he lived with wife in two story house. He is retired from [employer].     Today Mr. Mason states he sleeps well if he takes his sleeping pills. He had a shower with OT and that went well this morning. He was able to get dressed as well with minimal help. He says he wants his sleeping pills scheduled for 10pm at night. His pain is controlled and has not had anything this morning yet. His bowels are moving well and appetite is fine. He denies nausea, vomiting, stomach upset recently. His legs are swollen but going down. His weight at home is usually 204lbs but at the hospital and then here he was much higher and last weight was 222lbs. He says he has no shortness of breath or cough and no cold symptoms recently. He denies dizziness or lightheadedness. His legs or knees give  out on him and this is why he falls. He had 9 falls in the last 6 months. He was using a cane then graduated to walker recently but lives in a 2 story house. He says he is moving to walker assisted living after leaving [facility] here. His wife will move in his apartment with him after she sells their home. Reviewed medications and no current concerns other than wanting the sleeping pill scheduled. He denies other concerns today. He has a picc line in his right arm and says this working fine.     REVIEW OF SYSTEMS:  PROBLEMS AND REVIEW OF SYSTEMS:   Today on ROS:   Currently, no fever, chills, or rigors. Decreased vision-wears glasses. Denies any chest pain, headaches, palpitations, lightheadedness, dizziness, shortness of breath, or cough. Appetite is good. Denies any GERD symptoms. Denies any difficulty with swallowing, nausea, or vomiting.  Denies any abdominal pain, diarrhea or constipation. Denies any urinary symptoms. No insomnia. No active bleeding. No rash. Positive for weakness, ambulates with walker, back incision, pain controlled, leg swelling, weight gain, recent falls      Allergies   Allergen Reactions     Atorvastatin      Muscle pain     Lisinopril Cough     Current Outpatient Medications   Medication Sig     acetaminophen (TYLENOL) 325 MG tablet Take 650 mg by mouth every 6 (six) hours as needed for pain.     allopurinol (ZYLOPRIM) 300 MG tablet Take 300 mg by mouth daily.     [START ON 11/7/2019] aspirin 81 mg chewable tablet Chew 81 mg daily.            bumetanide (BUMEX) 2 MG tablet Take 2 mg by mouth 2 (two) times a day at 9am and 6pm.     cefazolin sodium/dextrose,iso (CEFAZOLIN IN DEXTROSE, ISO-OS,) 2 gram/50 mL PgBk Infuse 2 g into a venous catheter every 8 (eight) hours.     chlorhexidine gluconate 2 % Liqd Apply topically once a week. Apply to PICC  Site topically one time a day every 7 day(s) for PICC  Site Care - change dressing every 7 days Clean site  with Chlorhexidrine Gluconate 2%; use  securement  device; apply impregnated anti-microbial disc to insert  site; cover with a transparent semipermeable  membrane     cholecalciferol, vitamin D3, 1,000 unit (25 mcg) tablet Take 2,000 Units by mouth daily.     digoxin (LANOXIN) 125 mcg tablet Take 125 mcg by mouth daily.     DULoxetine (CYMBALTA) 60 MG capsule Take 60 mg by mouth daily.     [START ON 11/13/2019] enoxaparin (LOVENOX) 100 mg/mL Syrg Inject 100 mg under the skin every 12 (twelve) hours.     [START ON 11/7/2019] ergocalciferol (ERGOCALCIFEROL) 50,000 unit capsule Take 50,000 Units by mouth once a week.     gabapentin (NEURONTIN) 100 MG capsule Take 500 mg by mouth 3 (three) times a day.     HYDROcodone-acetaminophen 5-325 mg per tablet Take 1-2 tablets by mouth every 4 (four) hours as needed for pain.            hydrOXYzine pamoate (VISTARIL) 25 MG capsule Take 25-50 mg by mouth every 6 (six) hours as needed for itching.     lansoprazole (PREVACID) 30 MG capsule Take 30 mg by mouth daily.      [START ON 12/13/2019] leflunomide (ARAVA) 20 MG tablet Take 20 mg by mouth daily.     metoprolol tartrate (LOPRESSOR) 50 MG tablet Take 100 mg by mouth 2 (two) times a day. 150mg in am and 100mg in evening           nitroglycerin (NITROSTAT) 0.4 MG SL tablet Place 0.4 mg under the tongue every 5 (five) minutes as needed for chest pain.     polyethylene glycol (MIRALAX) 17 gram packet Take 17 g by mouth daily.     predniSONE (DELTASONE) 1 MG tablet Take by mouth Take 4 Tablets by mouth daily for 14 days, THEN 3 Tablets daily for 14 days, THEN 2 Tablets daily for 14 days, THEN 1 Tablet daily for 14 days .     senna (SENOKOT) 8.6 mg tablet Take 2 tablets by mouth daily.     spironolactone (ALDACTONE) 25 MG tablet Take 12.5 mg by mouth daily.     traZODone (DESYREL) 50 MG tablet Take 50 mg by mouth at bedtime.     [START ON 11/13/2019] warfarin (COUMADIN) 2.5 MG tablet Take 5 mg by mouth daily. inr on 11/15           zolpidem (AMBIEN) 10 mg tablet Take 10  mg by mouth at bedtime.            Past Medical History:    Past Medical History:   Diagnosis Date     (HFpEF) heart failure with preserved ejection fraction (H)      A-fib (H)      CAD (coronary artery disease)      Depression with anxiety      Diverticulosis      Gout      MARYELLEN (obstructive sleep apnea)      RA (rheumatoid arthritis) (H)      Renal infarction (H)            PHYSICAL EXAMINATION  Vitals:    11/03/19 1602   BP: 131/59   Pulse: (!) 111   Resp: 18   Temp: 97.2  F (36.2  C)   SpO2: 99%   Weight: 222 lb (100.7 kg)       Today on physical exam:     GENERAL: Awake, Alert, oriented x3, not in any form of acute distress, answers questions appropriately, follows simple commands, conversant, obese  HEENT: Head is normocephalic with normal hair distribution. No evidence of trauma. Ears: No acute purulent discharge. Eyes: Conjunctivae pink with no scleral jaundice. Nose: Normal mucosa and septum. NECK: Supple with no cervical or supraclavicular lymphadenopathy. Trachea is midline. glasses  CHEST: No tenderness or deformity, no crepitus  LUNG: dim to auscultation with good chest expansion. There are no crackles or wheezes, normal AP diameter. No shortness of breath or cough  BACK: midline thoracic incision staples and sutures intact, mild erythema, no drainage, no warmth  CVS: irregularly irregular rhythm, there are no murmurs, rubs, gallops, or heaves,  2+ pulses symmetric in all extremities.   ABDOMEN: Rounded and soft, nontender to palpation, non distended, no masses, no organomegaly, good bowel sounds, no rebound or guarding, no peritoneal signs.   EXTREMITIES: 2+ ble edema, tubigrips, RUE PICC line c/d/i  SKIN: Warm and dry, no erythema noted.  Skin color, texture, no rashes or lesions.  NEUROLOGICAL: The patient is oriented to person, place and time. Strength and sensation are grossly intact. Face is symmetric.            LABS:   Recent Results (from the past 168 hour(s))   Creatinine   Result Value Ref  Range    Creatinine 0.84 0.70 - 1.30 mg/dL    GFR MDRD Af Amer >60 >60 mL/min/1.73m2    GFR MDRD Non Af Amer >60 >60 mL/min/1.73m2   C-Reactive Protein (CRP)   Result Value Ref Range    CRP 6.6 (H) 0.0 - 0.8 mg/dL   Blood Urea Nitrogen (BUN)   Result Value Ref Range    BUN 12 8 - 22 mg/dL   AST (SGOT)   Result Value Ref Range    AST 9 0 - 40 U/L   ALT (SGPT)   Result Value Ref Range    ALT <9 0 - 45 U/L   Alkaline Phosphatase   Result Value Ref Range    Alkaline Phosphatase 131 (H) 45 - 120 U/L   Bilirubin, Total   Result Value Ref Range    Bilirubin, Total 0.4 0.0 - 1.0 mg/dL   Bilirubin, Direct   Result Value Ref Range    Bilirubin, Direct 0.2 <=0.5 mg/dL   HM1 (CBC with Diff)   Result Value Ref Range    WBC 6.7 4.0 - 11.0 thou/uL    RBC 3.57 (L) 4.40 - 6.20 mill/uL    Hemoglobin 9.6 (L) 14.0 - 18.0 g/dL    Hematocrit 32.9 (L) 40.0 - 54.0 %    MCV 92 80 - 100 fL    MCH 26.9 (L) 27.0 - 34.0 pg    MCHC 29.2 (L) 32.0 - 36.0 g/dL    RDW 16.1 (H) 11.0 - 14.5 %    Platelets 284 140 - 440 thou/uL    MPV 10.7 8.5 - 12.5 fL    Neutrophils % 67 50 - 70 %    Lymphocytes % 19 (L) 20 - 40 %    Monocytes % 10 2 - 10 %    Eosinophils % 3 0 - 6 %    Basophils % 1 0 - 2 %    Neutrophils Absolute 4.5 2.0 - 7.7 thou/uL    Lymphocytes Absolute 1.2 0.8 - 4.4 thou/uL    Monocytes Absolute 0.7 0.0 - 0.9 thou/uL    Eosinophils Absolute 0.2 0.0 - 0.4 thou/uL    Basophils Absolute 0.0 0.0 - 0.2 thou/uL     No results found for this or any previous visit.      Lab Results   Component Value Date    WBC 6.7 11/04/2019    HGB 9.6 (L) 11/04/2019    HCT 32.9 (L) 11/04/2019    MCV 92 11/04/2019     11/04/2019       No results found for: LMQFJPOY28  No results found for: HGBA1C  No results found for: INR, PROTIME  No results found for: [id number]  No results found for: TSH        ASSESSMENT/PLAN:    1. S/p T8-T11 spinal fusion, MSSA wound infection, s/p I&D:  Incision c/d/i staples and sutures intact. Cefazolin q8h x 6 weeks. JACKLYN PICC  line c/d/i. F/u with ID in 2 weeks on 11/19. F/u with surgeon on 11/15. Pain controlled on tylenol three times a day, gabapentin, norco prn, hydroxyzine prn. Max of tylenol 4gm from all sources. Ordered IS q1h while awake.   2. Frequent falls: PT, OT. Reports knees and legs buckling. Moving to senior living. Ambulates with walker.   3. CAD, s/p drug alluding stent: No chest pain recently. On aspirin, nitro prn. Metoprolol.   4. Diastolic CHF: daily weights 222lbs. Baseline weight around 204lbs. 2+ edema, no shortness of breath today. On bumex 2mg two times a day, spironolactone. Heart healthy diet ordered. Changed tubigrips to elier hose.   5. Atrial fibrillation: rate uncontrolled at times 80-100s but does have readings in 110s. On digoxin, metoprolol. Added hold parameters. On lovenox x 14 days, may restart coumadin 5mg daily on 11/13. INR on 11/15. Dc heparin as no need for heparin and lovenox at this time. Increased metoprolol to 150mg in am and 100mg in evening.   6. Seropositive RA: On abatacept injections q4h weeks, will hold while in tcu. On leflunomide hold for 2 weeks until 11/13. Prednisone taper.   7. BPH: No concerns at this time.   8. Depression and anxiety: On duloxetine.   9. Gout: On allopurinol.   10. Vitamin d deficiency: on vitamin d 50,000 u weekly, 1000u daily. No need for both. Will dc daily and check level.   11. GERD: On lansoprazole.   12. Hypomagnesemia: on mag ox.   13. Hypokalemia: on kcl.   14. Constipation: on miralax daily, senna 2 tabs daily. No recent concerns. Will dc stop dates on stool softeners.   15. Insomnia: On trazodone at bedtime, ambien at bedtime prn. Will change ambien to 2200 scheduled.   16. Research study medication: On 10mg daily, patient has own supply and unknown if on medication or placebo. He is on study through cardiology office for having history of 2 MI and diabetes to see if this reduces risk of recurrent Mis.   17. Type 2 DM: reports diet controlled. No hga1c found in  epic system. Reports well managed and checks with pcp appointments.   18. MARYELLEN: previously refused CPAP.   19. Obesity: 222lbs baseline 204lbs but fluid overloaded. Counseling regarding healthier lifestyle, weight loss, dietary habits, increasing physical activity.   20. CKD stage 3: Cr0.82 on 11/1. Stable.     Weekly labs  Will add mg, digoxin, vit d level to next labs    Per therapy eval today    Electronically signed by: Cinthya Alonso NP    Total floor/unit time spent 35 with 25 time spent on counseling and coordination of care. Counseling was done regarding hospital course, med changes, lab results, a fib management, anticoagulant management, pain management, chf management, insomnia management, edema management. Coordinated care with nursing for management of diabetes, research medication, chf, pain management, insomnia management.

## 2021-06-03 VITALS
RESPIRATION RATE: 17 BRPM | SYSTOLIC BLOOD PRESSURE: 108 MMHG | OXYGEN SATURATION: 95 % | DIASTOLIC BLOOD PRESSURE: 63 MMHG | WEIGHT: 214 LBS | HEART RATE: 87 BPM | TEMPERATURE: 98.2 F

## 2021-06-03 VITALS
WEIGHT: 214 LBS | OXYGEN SATURATION: 96 % | RESPIRATION RATE: 18 BRPM | SYSTOLIC BLOOD PRESSURE: 137 MMHG | TEMPERATURE: 97.7 F | DIASTOLIC BLOOD PRESSURE: 77 MMHG | HEART RATE: 96 BPM

## 2021-06-03 VITALS
SYSTOLIC BLOOD PRESSURE: 121 MMHG | OXYGEN SATURATION: 98 % | HEART RATE: 84 BPM | DIASTOLIC BLOOD PRESSURE: 81 MMHG | WEIGHT: 213 LBS | TEMPERATURE: 96.9 F | RESPIRATION RATE: 18 BRPM

## 2021-06-03 VITALS
RESPIRATION RATE: 18 BRPM | SYSTOLIC BLOOD PRESSURE: 126 MMHG | HEART RATE: 92 BPM | WEIGHT: 217.4 LBS | OXYGEN SATURATION: 94 % | TEMPERATURE: 97.4 F | DIASTOLIC BLOOD PRESSURE: 77 MMHG

## 2021-06-03 VITALS
HEART RATE: 103 BPM | SYSTOLIC BLOOD PRESSURE: 120 MMHG | OXYGEN SATURATION: 97 % | RESPIRATION RATE: 18 BRPM | WEIGHT: 214 LBS | DIASTOLIC BLOOD PRESSURE: 74 MMHG | TEMPERATURE: 99 F

## 2021-06-03 VITALS
SYSTOLIC BLOOD PRESSURE: 131 MMHG | RESPIRATION RATE: 18 BRPM | DIASTOLIC BLOOD PRESSURE: 59 MMHG | TEMPERATURE: 97.2 F | HEART RATE: 111 BPM | WEIGHT: 222 LBS | OXYGEN SATURATION: 99 %

## 2021-06-03 NOTE — TELEPHONE ENCOUNTER
[facility] Nurse Triage Anticoagulation Note      Provider: RANJAN Davis  Facility: [facility]    Facility Type: TCU    Caller: Doretha  Call Back Number:  [phone]    Reason for call: INR    Today s INR: 1.32  Previous INR: 11/13 1.09(5mg daily and Lovenox 100mg two times a day)    Diagnosis/Goal: AFIB  Heparin/Lovenox: Yes: Lovenox 100mg two times a day   Currently on ABX: Yes; Cefazolin 2g  Other interacting Medications: None  Missed or refused doses: No    Patient also had an alkaline phosphatase drawn today.      Verbal Order/Direction given by Provider: Warfarin 5mg daily.  Continue Lovenox 100mg two times a day  Check INR 11/18/19.  Fax Alkaline phosphatase result to ID.      Provider giving order: RANJAN Davis    Verbal order given to: Tasia Lacey RN

## 2021-06-03 NOTE — PROGRESS NOTES
[facility]    DATE: 2019    NAME:  Noe Mason             :  1950  MRN: [id number]  CODE STATUS:  FULL CODE    VISIT TYPE: Review Of Multiple Medical Conditions     FACILITY:  [facility] [[id number]]       CHIEF COMPLAIN/REASON FOR VISIT:    Chief Complaint   Patient presents with     Review Of Multiple Medical Conditions               HISTORY OF PRESENT ILLNESS: Noe Mason is a 69 y.o. male who was admitted  for T8-T11 spinal fusion due to unstable T9-10 fracture after multiple falls. He then presented to ED at [facility] on 10/28 for wound infection. He underwent I&D on 10/29 and cultures grew MSSA. ID consulted and changed vanco to cefazolin. This was recommended for 6 weeks. He had post op anemia but did not require any blood transfusions. He had venous dopplers of ble that were negative for dvt. F/u with ID in 2 weeks. He was recommended to transfer to TCU for further rehab. He has PMH of frequent falls, CAD s/p stenting, Diastolic CHF, A fib on chronic warfarin, renal infarction s/p emboli, seropositive RA, Gout, MARYELLEN, BPH, urinary retention, depression and anxiety. Prior to this he lived with wife in two story house. He is retired from [employer].     Today Mr. Mason is seen for follow up visit today. He says he has not had any fevers that he knows of but is still having the night sweats. He continues to soak his bed most nights but will wake up and ask for his temp to get checked and does not have a fever. He says he is not having any further pain, redness, warmth around his picc line. It seems to be working fine. He says there was a lotion ordered for his feet but has not come yet. His appetite is pretty good and no issues with nausea or stomach upset. He is not having any other concerns. His weights have been stable and no other concerns. He does ask how much longer he needs to be on the lovenox because he does not like these  Anesthesia ROS/Med Hx        Neuro/Psych Review:    Pt. negative for neuromuscular disease      Anesthesia Plan     ASA Status: 2  Anesthesia Type: L&D Epidural  Reviewed: Lab Results, Nursing Notes, Consultations, Problem List and Patient Summary  The proposed anesthetic plan, including its risks and benefits, have been discussed with the Patient - along with the risks and benefits of alternatives.  Questions were encouraged and answered and the patient and/or representative agrees to proceed.  Blood Products: Not Anticipated      Physical Exam  Mallampati: II  TM Distance: >3 FB  Neck ROM: Full  Cardio Rhythm: Regular  Cardio Rate: Normal  Breath sounds clear to auscultation:  Yes  Patient Demonstrates:  Gravid                   injections. Per review of chart happened to find that he had an INR drawn yesterday 11/13 but was ordered for 11/15. Nursing called on call provider who ordered to give coumadin 5mg x 1 and then contact provider for new orders. Cancelled previous coumadin and inr orders. INR was ordered for 11/15 so unclear why done on 11/13 as had not had any coumadin yet, restarted coumadin that day.     REVIEW OF SYSTEMS:  PROBLEMS AND REVIEW OF SYSTEMS:   Today on ROS:   Currently, no fever, chills, or rigors. Decreased vision-wears glasses. Denies any chest pain, headaches, palpitations, lightheadedness, dizziness, shortness of breath, or cough. Appetite is good. Denies any GERD symptoms. Denies any difficulty with swallowing, nausea, or vomiting.  Denies any abdominal pain, diarrhea or constipation. Denies any urinary symptoms. No insomnia. No active bleeding. No rash. Positive for ambulates with walker, back incision, pain controlled, leg swelling improved, night sweats, but no fevers or chills, iv antibiotics, picc line, dry skin      Allergies   Allergen Reactions     Atorvastatin      Muscle pain     Lisinopril Cough     Current Outpatient Medications   Medication Sig     acetaminophen (TYLENOL) 325 MG tablet Take 650 mg by mouth every 6 (six) hours as needed for pain.     allopurinol (ZYLOPRIM) 300 MG tablet Take 300 mg by mouth daily.     aspirin 81 mg chewable tablet Chew 81 mg daily.            bumetanide (BUMEX) 2 MG tablet Take 2 mg by mouth 2 (two) times a day at 9am and 6pm.     cefazolin sodium/dextrose,iso (CEFAZOLIN IN DEXTROSE, ISO-OS,) 2 gram/50 mL PgBk Infuse 2 g into a venous catheter every 8 (eight) hours.     chlorhexidine gluconate 2 % Liqd Apply topically once a week. Apply to PICC  Site topically one time a day every 7 day(s) for PICC  Site Care - change dressing every 7 days Clean site  with Chlorhexidrine Gluconate 2%; use securement  device; apply impregnated anti-microbial disc to insert  site;  cover with a transparent semipermeable  membrane     digoxin (LANOXIN) 125 mcg tablet Take 125 mcg by mouth daily.     DULoxetine (CYMBALTA) 60 MG capsule Take 60 mg by mouth daily.     enoxaparin (LOVENOX) 100 mg/mL Syrg Inject 100 mg under the skin every 12 (twelve) hours.     ergocalciferol (ERGOCALCIFEROL) 50,000 unit capsule Take 50,000 Units by mouth once a week.     gabapentin (NEURONTIN) 100 MG capsule Take 500 mg by mouth 3 (three) times a day.     HYDROcodone-acetaminophen 5-325 mg per tablet Take 1-2 tablets by mouth every 4 (four) hours as needed for pain.            lansoprazole (PREVACID) 30 MG capsule Take 30 mg by mouth daily.      [START ON 12/13/2019] leflunomide (ARAVA) 20 MG tablet Take 20 mg by mouth daily.     metoprolol tartrate (LOPRESSOR) 50 MG tablet Take 100 mg by mouth 2 (two) times a day. 150mg in am and 100mg in evening           predniSONE (DELTASONE) 1 MG tablet Take by mouth Take 4 Tablets by mouth daily for 14 days, THEN 3 Tablets daily for 14 days, THEN 2 Tablets daily for 14 days, THEN 1 Tablet daily for 14 days .     spironolactone (ALDACTONE) 25 MG tablet Take 12.5 mg by mouth daily.     traZODone (DESYREL) 50 MG tablet Take 50 mg by mouth at bedtime.     trolamine salicylate (ASPERCREME) 10 % cream Apply 1 application topically 2 (two) times a day. And q6h prn     warfarin sodium (WARFARIN ORAL) Take by mouth. 11/15/19 INR 1.32  Cont 5mg daily and Lovenox 100mg two times a day.  Next INR 11/18/19.     white petrolatum (AQUAPHOR ORIGINAL) 41 % Oint Apply 1 application topically at bedtime.     zolpidem (AMBIEN) 10 mg tablet Take 10 mg by mouth at bedtime.            Past Medical History:    Past Medical History:   Diagnosis Date     (HFpEF) heart failure with preserved ejection fraction (H)      A-fib (H)      CAD (coronary artery disease)      Depression with anxiety      Diverticulosis      Gout      MARYELLEN (obstructive sleep apnea)      RA (rheumatoid arthritis) (H)      Renal  infarction (H)            PHYSICAL EXAMINATION  Vitals:    11/13/19 2206   BP: 108/63   Pulse: 87   Resp: 17   Temp: 98.2  F (36.8  C)   SpO2: 95%   Weight: 214 lb (97.1 kg)       Today on physical exam:     GENERAL: Awake, Alert, oriented x3, not in any form of acute distress, answers questions appropriately, follows simple commands, conversant, obese  HEENT: Head is normocephalic with normal hair distribution. No evidence of trauma. Ears: No acute purulent discharge. Eyes: Conjunctivae pink with no scleral jaundice. Nose: Normal mucosa and septum. NECK: Supple with no cervical or supraclavicular lymphadenopathy. Trachea is midline. glasses  CHEST: No tenderness or deformity, no crepitus  LUNG: dim to auscultation with good chest expansion. There are no crackles or wheezes, normal AP diameter. No shortness of breath or cough  BACK: midline thoracic incision staples and sutures intact, mild erythema, no drainage, no warmth  CVS: irregularly irregular rhythm, there are no murmurs, rubs, gallops, or heaves,  2+ pulses symmetric in all extremities.   ABDOMEN: Rounded and soft, nontender to palpation, non distended, no masses, no organomegaly, good bowel sounds, no rebound or guarding, no peritoneal signs.   EXTREMITIES: 1+ ble edema, tubigrips, RUE PICC line c/d/i, no erythema, edema, warmth  SKIN: Warm and dry,dry cracked skin on bilateral feet  NEUROLOGICAL: The patient is oriented to person, place and time. Strength and sensation are grossly intact. Face is symmetric.            LABS:   Recent Results (from the past 168 hour(s))   HM1 (CBC with Diff)   Result Value Ref Range    WBC 6.5 4.0 - 11.0 thou/uL    RBC 4.09 (L) 4.40 - 6.20 mill/uL    Hemoglobin 11.1 (L) 14.0 - 18.0 g/dL    Hematocrit 37.5 (L) 40.0 - 54.0 %    MCV 92 80 - 100 fL    MCH 27.1 27.0 - 34.0 pg    MCHC 29.6 (L) 32.0 - 36.0 g/dL    RDW 16.0 (H) 11.0 - 14.5 %    Platelets 286 140 - 440 thou/uL    MPV 11.1 8.5 - 12.5 fL    Neutrophils % 62 50 - 70 %     Lymphocytes % 21 20 - 40 %    Monocytes % 10 2 - 10 %    Eosinophils % 5 0 - 6 %    Basophils % 1 0 - 2 %    Neutrophils Absolute 4.1 2.0 - 7.7 thou/uL    Lymphocytes Absolute 1.4 0.8 - 4.4 thou/uL    Monocytes Absolute 0.7 0.0 - 0.9 thou/uL    Eosinophils Absolute 0.3 0.0 - 0.4 thou/uL    Basophils Absolute 0.1 0.0 - 0.2 thou/uL   C-Reactive Protein   Result Value Ref Range    CRP 4.2 (H) 0.0 - 0.8 mg/dL   Alkaline Phosphatase, Total   Result Value Ref Range    Alkaline Phosphatase 131 (H) 45 - 120 U/L   Glycosylated Hemoglobin A1c   Result Value Ref Range    Hemoglobin A1c 6.0 4.2 - 6.1 %   Digoxin (Lanoxin )   Result Value Ref Range    Digoxin 0.5 0.5 - 2.0 ng/mL   Potassium   Result Value Ref Range    Potassium 3.9 3.5 - 5.0 mmol/L   Vitamin D, Total (25-Hydroxy)   Result Value Ref Range    Vitamin D, Total (25-Hydroxy) 41.0 30.0 - 80.0 ng/mL   Magnesium   Result Value Ref Range    Magnesium 1.8 1.8 - 2.6 mg/dL   ALT (SGPT)   Result Value Ref Range    ALT <9 0 - 45 U/L   AST (SGOT)   Result Value Ref Range    AST 15 0 - 40 U/L   Bilirubin, Total   Result Value Ref Range    Bilirubin, Total 0.4 0.0 - 1.0 mg/dL   Bilirubin, Direct   Result Value Ref Range    Bilirubin, Direct 0.2 <=0.5 mg/dL   Blood Urea Nitrogen (BUN)   Result Value Ref Range    BUN 17 8 - 22 mg/dL   Creatinine   Result Value Ref Range    Creatinine 1.00 0.70 - 1.30 mg/dL    GFR MDRD Af Amer >60 >60 mL/min/1.73m2    GFR MDRD Non Af Amer >60 >60 mL/min/1.73m2   INR   Result Value Ref Range    INR 1.09 0.90 - 1.10   INR   Result Value Ref Range    INR 1.32 (H) 0.90 - 1.10   Alkaline Phosphatase   Result Value Ref Range    Alkaline Phosphatase 145 (H) 45 - 120 U/L     Results for orders placed or performed in visit on 11/05/19   Basic Metabolic Panel   Result Value Ref Range    Sodium 143 136 - 145 mmol/L    Potassium 3.9 3.5 - 5.0 mmol/L    Chloride 100 98 - 107 mmol/L    CO2 34 (H) 22 - 31 mmol/L    Anion Gap, Calculation 9 5 - 18 mmol/L     Glucose 90 70 - 125 mg/dL    Calcium 9.9 8.5 - 10.5 mg/dL    BUN 11 8 - 22 mg/dL    Creatinine 0.93 0.70 - 1.30 mg/dL    GFR MDRD Af Amer >60 >60 mL/min/1.73m2    GFR MDRD Non Af Amer >60 >60 mL/min/1.73m2         Lab Results   Component Value Date    WBC 6.5 11/11/2019    HGB 11.1 (L) 11/11/2019    HCT 37.5 (L) 11/11/2019    MCV 92 11/11/2019     11/11/2019       No results found for: [id number]  Lab Results   Component Value Date    HGBA1C 6.0 11/11/2019     Lab Results   Component Value Date    INR 1.32 (H) 11/15/2019    INR 1.09 11/13/2019    INR 1.08 11/05/2019     Vitamin D, Total (25-Hydroxy)   Date Value Ref Range Status   11/11/2019 41.0 30.0 - 80.0 ng/mL Final     No results found for: TSH        ASSESSMENT/PLAN:    1. S/p T8-T11 spinal fusion, MSSA wound infection, s/p I&D:  Incision c/d/i staples and sutures intact. some erythema around incision. Cefazolin q8h x 6 weeks. RUE PICC line c/d/i. F/u with ID in 2 weeks on 11/19. F/u with surgeon on 11/15. Pain controlled on tylenol three times a day, gabapentin, norco prn. Using norco 1-2 times per day. Max of tylenol 4gm from all sources.  IS q1h while awake.   No further fevers but continues to have night sweats. Blood cultures still pending. Labs stable.   2. Frequent falls: PT, OT. Reports knees and legs buckling. Moving to [facility]. Ambulates with walker. Knee pain,  trolamine cream two times a day and prn. No concerns today.   3. CAD, s/p drug alluding stent: No chest pain recently. On aspirin, nitro prn. Metoprolol.   4. Diastolic CHF: daily weights [phone]. Baseline weight around 204lbs. 1+ edema, no shortness of breath today. On bumex 2mg two times a day, spironolactone. Heart healthy diet ordered. elier levy. Stable recently.   5. Atrial fibrillation: rate controlled at times 80s. On digoxin, metoprolol.  hold parameters. On lovenox two times a day. INR was ordered for 11/15. Started coumadin 5mg daily 11/13. Unclear why INR done on 11/13  as was not ordered. Level subtherapeutic 1.03 as has not had any coumadin. INR on 11/15 as previously ordered. Continue coumadin 5mg daily until then. Continue lovenox two times a day. Previously increased metoprolol to 150mg in am and 100mg in evening. Digoxin level 0.5 on 11/11. Stable.   6. Seropositive RA: On abatacept injections q4h weeks, will hold while in tcu. On leflunomide hold for 2 weeks until 11/13. Prednisone taper.   7. BPH: No concerns at this time.   8. Depression and anxiety: On duloxetine.   9. Gout: On allopurinol.   10. Vitamin d deficiency: on vitamin d 50,000 u weekly, vit d level 41 on 11/11. Stable.   11. GERD: On lansoprazole.   12. Hypomagnesemia: on mag ox. Mg 1.8 on 11/11. Stable.   13. Hypokalemia: on kcl. k 3.9 on 11/11.   14. Constipation: on miralax daily, senna 2 tabs daily. No recent concerns. Stable.   15. Insomnia: On trazodone at bedtime, ambien at bedtime prn. No further concerns.    16. Research study medication: On 10mg daily, patient has own supply and unknown if on medication or placebo. He is on study through cardiology office for having history of 2 MI and diabetes to see if this reduces risk of recurrent Mis.   17. Type 2 DM: reports diet controlled. No hga1c found in epic system. Reports well managed and checks with pcp appointments. hga1c 6.0 on 11/11 stable. No need for meds at this time.   18. MARYELLEN: previously refused CPAP. No concerns.   19. Obesity: [phone] baseline 204lbs but fluid overloaded. Counseling regarding healthier lifestyle, weight loss, dietary habits, increasing physical activity.   20. CKD stage 3: Cr0.82 on 11/1. Cr 1.00 on 11/11. Stable.   21. Anemia of chronic disease: Hg 11.1 on 11/11. Stable.   22. Dry skin: aquaphor at bedtime scheduled. Needs nail care as well.     Weekly labs      Electronically signed by: Cinthya Alonso NP

## 2021-06-03 NOTE — TELEPHONE ENCOUNTER
[facility] Nurse Triage Anticoagulation Note      Provider: RANJAN Davis  Facility: [facility]    Facility Type: TCU    Caller: Shanae  Call Back Number:  [phone]    Reason for call: INR    Today s INR: 2.43  Previous INR: 11/18/19 INR 1.9 5mg & Lovenox D/C'd    Diagnosis/Goal: AFIB  Heparin/Lovenox: No D/C'd yesterday  Currently on ABX: Yes  Cefazolin  Other interacting Medications: ASA 81mg qd  Missed or refused doses: No    Verbal Order/Direction given by Provider: Cont 5mg daily. Next INR 11/25 by C RN, & she to call to PMD. Send Coumadin flow sheet home with pt.    Provider giving order: RANJAN Davis    Verbal order given to: Sonia Deng RN

## 2021-06-03 NOTE — TELEPHONE ENCOUNTER
[facility] Nurse Triage Telephone Note      Provider: RANJAN Davis  Facility: [facility]    Facility Type: TCU    Caller: Henny  Call Back Number:  [phone]    Allergies: Atorvastatin and Lisinopril    Reason for call: Nurse reporting Heme 1, BUN, Creat, GFR, CRP, AST, ALT, Alk Phos, Bili total, and Bili direct results.  These labs were ordered by ID.  Patient is receiving Ancef IV.       Verbal Order/Direction given by Provider: Fax labs to ID.      Provider giving order: RANJAN Davis    Verbal order given to: Henny Lacey RN

## 2021-06-03 NOTE — PROGRESS NOTES
[facility]      Facility:    [facility] [[id number]]  Code Status: FULL CODE      Chief Complaint/Reason for Visit:  Chief Complaint   Patient presents with     H & P     post op infection T fusion       HPI:   Noe is a 69 y.o. 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    He lives in a house with his wife, uses a rolling walker, but he has had numerous falls at home. Back in early September 2019, he had a fall , resulting in he had a T9/T10 displaced fracture.  He had T8-T11 posterior spinal fusion for an unstable T9-T10 fracture.  He had been discharged to [facility] TCU.    He had been discharged home, but came back couple days later with weakness, inability to stand.  He was discharged again to [facility] transitional care after lumbar and thoracic spine MRIs and MRI of the brain showed no change.  Discharged home, but was readmitted again the following day, and again transition to [facility] TCU, where he had changes in his operative wound    He is wound did not heal well, extra protein was not helpful, and he developed a superficial infection with purulent drainage  in his operative wound.  He was admitted and surgical site infection = MSSA.  He had a thoracic incision and debridement with evacuation of a superficial abscess, and irrigation of the deep tissues.  He was initially on vancomycin, and IV changed it to cefazolin.  His rheumatoid arthritis medications were held.  PICC line was placed and he was discharged with a plan for prolonged IV antibiotic therapy.    He transferred to [facility] TCU for therapies      Past Medical History:  Past Medical History:    Diagnosis Date     (HFpEF) heart failure with preserved ejection fraction (H)      A-fib (H)      CAD (coronary artery disease)      Depression with anxiety      Diverticulosis      Gout      MARYELLEN (obstructive sleep apnea)      RA (rheumatoid arthritis) (H)      Renal infarction (H)            Surgical History:  Past Surgical History:   Procedure Laterality Date     CARDIAC CATHETERIZATION  08/06/2003     CHOLECYSTECTOMY  08/14/2011     excision of zenker's diverticulum       IRRIGATION AND DEBRIDEMENT SPINE  10/29/2019     Knee arthroscopy with medial meniscectomy Right 03/04/2011     left ACL repair, meniscectomy  1990s     MASTOIDECTOMY; COMPLETE Left 11/2008     NM CARD EXERCISE REST/STRESS SPECT  09/2010     OUTER EAR SURGERY PROC UNLISTED       Right shoulder subacromal decompression and distal clavical resection  09/2010     SPINAL FUSION  09/11/2019    T8-11 posterior spinal fusion        Family History:   Family History   Problem Relation Age of Onset     Cataracts Mother      Coronary artery disease Mother      Hypertension Mother      Alcohol abuse Father      Cancer Father         throat     Cataracts Father      Other Father         domestic violence     Obesity Sister      Other Sister         suicide     Glaucoma Sister      Coronary artery disease Brother      Other Brother         deafness     Hypertension Brother        Social History:    Social History     Socioeconomic History     Marital status:      Spouse name: Not on file     Number of children: Not on file     Years of education: Not on file     Highest education level: Not on file   Occupational History     Not on file   Social Needs     Financial resource strain: Not on file     Food insecurity:     Worry: Not on file     Inability: Not on file     Transportation needs:     Medical: Not on file     Non-medical: Not on file   Tobacco Use     Smoking status: Never Smoker     Smokeless tobacco: Never Used   Substance and Sexual  Activity     Alcohol use: Yes     Alcohol/week: 2.0 standard drinks     Types: 2 Standard drinks or equivalent per week     Comment: 2-3 times per month     Drug use: Not on file     Sexual activity: Not on file   Lifestyle     Physical activity:     Days per week: Not on file     Minutes per session: Not on file     Stress: Not on file   Relationships     Social connections:     Talks on phone: Not on file     Gets together: Not on file     Attends Hoahaoism service: Not on file     Active member of club or organization: Not on file     Attends meetings of clubs or organizations: Not on file     Relationship status: Not on file     Intimate partner violence:     Fear of current or ex partner: Not on file     Emotionally abused: Not on file     Physically abused: Not on file     Forced sexual activity: Not on file   Other Topics Concern     Not on file   Social History Narrative     Not on file          Review of Systems   Pain is not severe  He is been working on car transfers, his last covered day at [facility] = 11/11/2019, plan to discharge to [facility] assisted living rather than to home.  His wife cells their home, she will also moved to the assisted living apartment  The remainder of the comprehensive review of systems is negative    Blood pressure 126/77, pulse 92, temperature 97.4  F (36.3  C), resp. rate 18, weight 217 lb 6.4 oz (98.6 kg), SpO2 94 %.    SLUMS = 21/30    Physical Exam  Constitutional:       General: He is not in acute distress.     Appearance: Normal appearance. He is obese.   HENT:      Right Ear: External ear normal.      Left Ear: External ear normal.      Mouth/Throat:      Mouth: Mucous membranes are moist.   Eyes:      Extraocular Movements: Extraocular movements intact.      Conjunctiva/sclera: Conjunctivae normal.   Neck:      Musculoskeletal: No neck rigidity.   Cardiovascular:      Rate and Rhythm: Normal rate. Rhythm irregular.   Pulmonary:      Breath sounds: Normal breath sounds. No  wheezing or rales.   Abdominal:      General: Bowel sounds are normal.      Palpations: Abdomen is soft.      Tenderness: There is no abdominal tenderness.   Musculoskeletal:         General: No tenderness.      Comments: PICC in his right arm  2+ lower extremity edema   Skin:     Findings: No rash.      Comments: Thoracic incision with mild erythema, no discharge   Neurological:      General: No focal deficit present.      Mental Status: He is alert and oriented to person, place, and time.      Motor: No weakness.   Psychiatric:         Mood and Affect: Mood normal.         Thought Content: Thought content normal.       Allergies   Allergen Reactions     Atorvastatin      Muscle pain     Lisinopril Cough       Medication List:  Current Outpatient Medications   Medication Sig     acetaminophen (TYLENOL) 325 MG tablet Take 650 mg by mouth every 6 (six) hours as needed for pain.     allopurinol (ZYLOPRIM) 300 MG tablet Take 300 mg by mouth daily.     aspirin 81 mg chewable tablet Chew 81 mg daily.            bumetanide (BUMEX) 2 MG tablet Take 2 mg by mouth 2 (two) times a day at 9am and 6pm.     cefazolin sodium/dextrose,iso (CEFAZOLIN IN DEXTROSE, ISO-OS,) 2 gram/50 mL PgBk Infuse 2 g into a venous catheter every 8 (eight) hours.     chlorhexidine gluconate 2 % Liqd Apply topically once a week. Apply to PICC  Site topically one time a day every 7 day(s) for PICC  Site Care - change dressing every 7 days Clean site  with Chlorhexidrine Gluconate 2%; use securement  device; apply impregnated anti-microbial disc to insert  site; cover with a transparent semipermeable  membrane     cholecalciferol, vitamin D3, 1,000 unit (25 mcg) tablet Take 2,000 Units by mouth daily.     digoxin (LANOXIN) 125 mcg tablet Take 125 mcg by mouth daily.     DULoxetine (CYMBALTA) 60 MG capsule Take 60 mg by mouth daily.     [START ON 11/13/2019] enoxaparin (LOVENOX) 100 mg/mL Syrg Inject 100 mg under the skin every 12 (twelve) hours.      ergocalciferol (ERGOCALCIFEROL) 50,000 unit capsule Take 50,000 Units by mouth once a week.     gabapentin (NEURONTIN) 100 MG capsule Take 500 mg by mouth 3 (three) times a day.     HYDROcodone-acetaminophen 5-325 mg per tablet Take 1-2 tablets by mouth every 4 (four) hours as needed for pain.            hydrOXYzine pamoate (VISTARIL) 25 MG capsule Take 25-50 mg by mouth every 6 (six) hours as needed for itching.     lansoprazole (PREVACID) 30 MG capsule Take 30 mg by mouth daily.      [START ON 12/13/2019] leflunomide (ARAVA) 20 MG tablet Take 20 mg by mouth daily.     metoprolol tartrate (LOPRESSOR) 50 MG tablet Take 100 mg by mouth 2 (two) times a day. 150mg in am and 100mg in evening           nitroglycerin (NITROSTAT) 0.4 MG SL tablet Place 0.4 mg under the tongue every 5 (five) minutes as needed for chest pain.     polyethylene glycol (MIRALAX) 17 gram packet Take 17 g by mouth daily.     predniSONE (DELTASONE) 1 MG tablet Take by mouth Take 4 Tablets by mouth daily for 14 days, THEN 3 Tablets daily for 14 days, THEN 2 Tablets daily for 14 days, THEN 1 Tablet daily for 14 days .     senna (SENOKOT) 8.6 mg tablet Take 2 tablets by mouth daily.     spironolactone (ALDACTONE) 25 MG tablet Take 12.5 mg by mouth daily.     traZODone (DESYREL) 50 MG tablet Take 50 mg by mouth at bedtime.     [START ON 11/13/2019] warfarin (COUMADIN) 2.5 MG tablet Take 5 mg by mouth daily. inr on 11/15           zolpidem (AMBIEN) 10 mg tablet Take 10 mg by mouth at bedtime.              Labs:    Ref Range & Units 11/5/19 0615 11/4/19 0619 11/4/19 0619    Sodium 136 - 145 mmol/L 143       Potassium 3.5 - 5.0 mmol/L 3.9       Chloride 98 - 107 mmol/L 100       CO2 22 - 31 mmol/L 34High        Anion Gap, Calculation 5 - 18 mmol/L 9       Glucose 70 - 125 mg/dL 90       Calcium 8.5 - 10.5 mg/dL 9.9       BUN 8 - 22 mg/dL 11  12      Creatinine 0.70 - 1.30 mg/dL 0.93   0.84     GFR MDRD Af Amer >60 mL/min/1.73m2 >60   >60     GFR MDRD Non  Af Amer >60 mL/min/1.73m2 >60          Ref Range & Units 11/5/19 0615 11/4/19 0619    WBC 4.0 - 11.0 thou/uL 6.8  6.7     RBC 4.40 - 6.20 mill/uL 3.83Low   3.57Low      Hemoglobin 14.0 - 18.0 g/dL 10.3Low   9.6Low      Hematocrit 40.0 - 54.0 % 35.6Low   32.9Low      MCV 80 - 100 fL 93  92     MCH 27.0 - 34.0 pg 26.9Low   26.9Low      MCHC 32.0 - 36.0 g/dL 28.9Low   29.2Low      RDW 11.0 - 14.5 % 16.1High   16.1High      Platelets 140 - 440 thou/uL 303  284     MPV 8.5 - 12.5 fL 10.6  10.7     Neutrophils % 50 - 70 % 63  67        Ref Range & Units 11/5/19 0615 11/4/19 0619    CRP 0.0 - 0.8 mg/dL 5.5High   6.6High         Ref Range & Units 11/5/19 0615    Digoxin 0.5 - 2.0 ng/mL 0.5          Assessment / Plan:    ICD-10-CM    1. Acute on chronic heart failure with preserved ejection fraction (H) I50.33  Bumex, spironolactone, metoprolol   2. Status post thoracic spinal fusion Z98.1  Norco as needed, gabapentin   3. Postoperative wound infection: MSSA T81.49XA  PICC line with cefazolin and   4. Benign hypertension with chronic kidney disease, stage III (H) I12.9     N18.3    5. Permanent atrial fibrillation I48.21  warfarin, digoxin   6. Lymphedema of both lower extremities I89.0    7. Depression with anxiety F41.8  Cymbalta which would also help with his pain.  Trazodone and Ambien for sleep   8. Class 1 obesity due to excess calories with serious comorbidity and body mass index (BMI) of 34.0 to 34.9 in adult E66.09     Z68.34    9. Stented coronary artery  Z95.5  current symptoms   10.  Rheumatoid Arthritis, seropositive   occasions on hold well treating wound infection.   11. Falls frequently R29.6  moving to [facility] previous TCU therapy completes           Electronically signed by: Aliza Perea MD

## 2021-06-03 NOTE — TELEPHONE ENCOUNTER
[facility] Nurse Triage Anticoagulation Note      Provider: RANJAN Davis  Facility: [facility]    Facility Type: TCU    Caller: Artem  Call Back Number:  [phone]    Reason for call: INR    Today s INR: 1.90  Previous INR: 11/15 1.32(5mg daily and Lovenox 100mg two times a day)    Diagnosis/Goal: AFIB  Heparin/Lovenox: Yes; Lovenox 100mg BID  Currently on ABX: Yes; Cefazolin  Other interacting Medications: Prednisone, ASA  Missed or refused doses: No    Nurse also reporting Heme 1, BMP, and CRP.      Verbal Order/Direction given by Provider: DC Lovenox.  Continue Warfarin 5mg daily.  Next INR 11/20/19.  Fax Heme 1, BMP, and CRP to ID.      Provider giving order: RANJAN Davis    Verbal order given to: Artem Lacey RN

## 2021-06-03 NOTE — PROGRESS NOTES
[facility]    DATE: 2019    NAME:  Noe Mason             :  1950  MRN: [id number]  CODE STATUS:  FULL CODE    VISIT TYPE: Discharge Summary     FACILITY:  [facility] [[id number]]       CHIEF COMPLAIN/REASON FOR VISIT:    Chief Complaint   Patient presents with     Discharge Summary               HISTORY OF PRESENT ILLNESS: Noe Mason is a 69 y.o. male who was admitted  for T8-T11 spinal fusion due to unstable T9-10 fracture after multiple falls. He then presented to ED at [facility] on 10/28 for wound infection. He underwent I&D on 10/29 and cultures grew MSSA. ID consulted and changed vanco to cefazolin. This was recommended for 6 weeks. He had post op anemia but did not require any blood transfusions. He had venous dopplers of ble that were negative for dvt. F/u with ID in 2 weeks. He was recommended to transfer to TCU for further rehab. He has PMH of frequent falls, CAD s/p stenting, Diastolic CHF, A fib on chronic warfarin, renal infarction s/p emboli, seropositive RA, Gout, MARYELLEN, BPH, urinary retention, depression and anxiety. Prior to this he lived with wife in two story house. He is retired from [employer].     TCU course:   Mr. Mason has made progress with therapy and is ambulating 200 feet with walker and supervision. He has done stairs with stand by assist. He is independent for most ADLs and toileting is supervision. He scored 27/30 on slums. During his stay his incision has healed well and staples and sutures were removed on 11/15. He continues on cefazolin without any concerns with his antibiotic therapy and picc line. He has been having weekly labs that are improving. He did have a follow up with ID on . They recommended a thoracic spine MRI on  prior to stopping Iv antibiotics on . He did develop night sweats during his tcu course and his labs were stable, no fevers, and blood cultures were repeated and  negative. He also had a tb skin test that was negative during his stay. He did update ID on this at his follow up and they reported to continue to monitor. His pain in back has been well controlled on gabapentin, tylenol, norco prn. He was nearly weaned off the norco when his right knee became increasingly painful. He was having pain in thigh down to the knee with buckling. He has had this issues for the last several months and previously had xray and imaging of thigh and lumbar spine. He does have known arthritis. Due to the sudden increase in pain and increased edema to rle venous doppler obtained and was negative for dvt. He was fitted with a right knee brace that he is now using. He is now using norco 10-325mg 1 every 6 hours as needed due to severe pain in knee and was sent home with small supply of this with orders to wean off at home. He will notify pcp if his knee pain is not improved in next week. His gabapentin was also increased to 600mg three times a day (max dose). His weights were trending down and then stabilized at 214lbs. HE was restarted on warfarin after bridging with lovenox. His inr is now therapeutic and he is off the lovenox. He will be due next for INR on 11/25. His leflunomide was held for 2 weeks postop as well. His bowels were managed with bowel regimen. His hga1c was 6.0 so no monitoring was needed. He is diet controlled. He was planning to move into [facility] due to his frequent falls in the last few months but then he and wife decided they could not afford this. He was not scheduled to discharge from facility by therapy until next week and could remain for iv antibiotics until completed but he is requesting early discharge today. ID has arranged for home infusion of iv antibiotics and he and his wife were taught to do this themselves. He will be returning home today with  PT, OT, HHA, RN, and home infusion pharmacy. He will f/u with PCP in 5-7 days.      REVIEW OF SYSTEMS:  PROBLEMS  AND REVIEW OF SYSTEMS:   Today on ROS:   Currently, no fever, chills, or rigors. Decreased vision-wears glasses. Denies any chest pain, headaches, palpitations, lightheadedness, dizziness, shortness of breath, or cough. Appetite is good. Denies any GERD symptoms. Denies any difficulty with swallowing, nausea, or vomiting.  Denies any abdominal pain, diarrhea or constipation. Denies any urinary symptoms. No insomnia. No active bleeding. No rash. Positive for ambulates with walker, back incision, pain controlled in back but worse in right knee, right knee brace, leg swelling improved, night sweats, but no fevers or chills, iv antibiotics, picc line, dry skin, right shoulder pain      Allergies   Allergen Reactions     Atorvastatin      Muscle pain     Lisinopril Cough     Current Outpatient Medications   Medication Sig     HYDROcodone-acetaminophen (NORCO )  mg per tablet Take 1 tablet by mouth every 6 (six) hours as needed for pain.     acetaminophen (TYLENOL) 325 MG tablet Take 650 mg by mouth every 6 (six) hours as needed for pain.     allopurinol (ZYLOPRIM) 300 MG tablet Take 300 mg by mouth daily.     aspirin 81 mg chewable tablet Chew 81 mg daily.            bumetanide (BUMEX) 2 MG tablet Take 2 mg by mouth 2 (two) times a day at 9am and 6pm.     cefazolin sodium/dextrose,iso (CEFAZOLIN IN DEXTROSE, ISO-OS,) 2 gram/50 mL PgBk Infuse 2 g into a venous catheter every 8 (eight) hours.     chlorhexidine gluconate 2 % Liqd Apply topically once a week. Apply to PICC  Site topically one time a day every 7 day(s) for PICC  Site Care - change dressing every 7 days Clean site  with Chlorhexidrine Gluconate 2%; use securement  device; apply impregnated anti-microbial disc to insert  site; cover with a transparent semipermeable  membrane     digoxin (LANOXIN) 125 mcg tablet Take 125 mcg by mouth daily.     DULoxetine (CYMBALTA) 60 MG capsule Take 60 mg by mouth daily.     ergocalciferol (ERGOCALCIFEROL) 50,000  unit capsule Take 50,000 Units by mouth once a week.     gabapentin (NEURONTIN) 100 MG capsule Take 600 mg by mouth 3 (three) times a day.            lansoprazole (PREVACID) 30 MG capsule Take 30 mg by mouth daily.      [START ON 12/13/2019] leflunomide (ARAVA) 20 MG tablet Take 20 mg by mouth daily.     metoprolol tartrate (LOPRESSOR) 50 MG tablet Take 100 mg by mouth 2 (two) times a day. 150mg in am and 100mg in evening           predniSONE (DELTASONE) 1 MG tablet Take by mouth Take 4 Tablets by mouth daily for 14 days, THEN 3 Tablets daily for 14 days, THEN 2 Tablets daily for 14 days, THEN 1 Tablet daily for 14 days .     spironolactone (ALDACTONE) 25 MG tablet Take 12.5 mg by mouth daily.     traZODone (DESYREL) 50 MG tablet Take 50 mg by mouth at bedtime.     trolamine salicylate (ASPERCREME) 10 % cream Apply 1 application topically 2 (two) times a day. And q6h prn     warfarin sodium (WARFARIN ORAL) Take by mouth. 11/20/19 INR 2.43 Cont 5mg daily. NExt INR 11/25 by [facility].  11/18/19 INR 1.90  DC Lovenox.  Continue 5mg daily.  Next INR 11/20/19.    11/15/19 INR 1.32  Cont 5mg daily and Lovenox 100mg two times a day.  Next INR 11/18/19.           white petrolatum (AQUAPHOR ORIGINAL) 41 % Oint Apply 1 application topically at bedtime.     zolpidem (AMBIEN) 10 mg tablet Take 10 mg by mouth at bedtime.            Past Medical History:    Past Medical History:   Diagnosis Date     (HFpEF) heart failure with preserved ejection fraction (H)      A-fib (H)      CAD (coronary artery disease)      Depression with anxiety      Diverticulosis      Gout      MARYELLEN (obstructive sleep apnea)      RA (rheumatoid arthritis) (H)      Renal infarction (H)            PHYSICAL EXAMINATION  Vitals:    11/20/19 2128   BP: 121/81   Pulse: 84   Resp: 18   Temp: 96.9  F (36.1  C)   SpO2: 98%   Weight: 213 lb (96.6 kg)       Today on physical exam:     GENERAL: Awake, Alert, oriented x3, not in any form of acute distress, answers questions  appropriately, follows simple commands, conversant, obese  HEENT: Head is normocephalic with normal hair distribution. No evidence of trauma. Ears: No acute purulent discharge. Eyes: Conjunctivae pink with no scleral jaundice. Nose: Normal mucosa and septum. NECK: Supple with no cervical or supraclavicular lymphadenopathy. Trachea is midline. glasses  CHEST: No tenderness or deformity, no crepitus  LUNG: dim to auscultation with good chest expansion. There are no crackles or wheezes, normal AP diameter. No shortness of breath or cough  BACK: midline thoracic incision staples/sutures removed, small open area in middle of incision where scabbed apparently rubbed off during night, few drops of sanguinous discharge on sheets, asked nursing to place steri strip  CVS: irregularly irregular rhythm, there are no murmurs, rubs, gallops, or heaves,  2+ pulses symmetric in all extremities.   ABDOMEN: Rounded and soft, nontender to palpation, non distended, no masses, no organomegaly, good bowel sounds, no rebound or guarding, no peritoneal signs.   EXTREMITIES: 1+ ble edema, tubigrips, RUE PICC line c/d/i, no erythema, edema, warmth, right knee 1+ edema  SKIN: Warm and dry,dry cracked skin on bilateral feet  NEUROLOGICAL: The patient is oriented to person, place and time. Strength and sensation are grossly intact. Face is symmetric.            LABS:   Recent Results (from the past 168 hour(s))   INR   Result Value Ref Range    INR 1.32 (H) 0.90 - 1.10   Alkaline Phosphatase   Result Value Ref Range    Alkaline Phosphatase 145 (H) 45 - 120 U/L   Basic Metabolic Panel   Result Value Ref Range    Sodium 141 136 - 145 mmol/L    Potassium 4.2 3.5 - 5.0 mmol/L    Chloride 96 (L) 98 - 107 mmol/L    CO2 35 (H) 22 - 31 mmol/L    Anion Gap, Calculation 10 5 - 18 mmol/L    Glucose 91 70 - 125 mg/dL    Calcium 10.8 (H) 8.5 - 10.5 mg/dL    BUN 23 (H) 8 - 22 mg/dL    Creatinine 1.28 0.70 - 1.30 mg/dL    GFR MDRD Af Amer >60 >60  mL/min/1.73m2    GFR MDRD Non Af Amer 56 (L) >60 mL/min/1.73m2   C-Reactive Protein   Result Value Ref Range    CRP 6.6 (H) 0.0 - 0.8 mg/dL   HM1 (CBC with Diff)   Result Value Ref Range    WBC 5.8 4.0 - 11.0 thou/uL    RBC 4.35 (L) 4.40 - 6.20 mill/uL    Hemoglobin 11.8 (L) 14.0 - 18.0 g/dL    Hematocrit 40.1 40.0 - 54.0 %    MCV 92 80 - 100 fL    MCH 27.1 27.0 - 34.0 pg    MCHC 29.4 (L) 32.0 - 36.0 g/dL    RDW 16.4 (H) 11.0 - 14.5 %    Platelets 324 140 - 440 thou/uL    MPV 11.4 8.5 - 12.5 fL    Neutrophils % 56 50 - 70 %    Lymphocytes % 29 20 - 40 %    Monocytes % 9 2 - 10 %    Eosinophils % 5 0 - 6 %    Basophils % 1 0 - 2 %    Neutrophils Absolute 3.3 2.0 - 7.7 thou/uL    Lymphocytes Absolute 1.7 0.8 - 4.4 thou/uL    Monocytes Absolute 0.5 0.0 - 0.9 thou/uL    Eosinophils Absolute 0.3 0.0 - 0.4 thou/uL    Basophils Absolute 0.1 0.0 - 0.2 thou/uL   INR   Result Value Ref Range    INR 1.90 (H) 0.90 - 1.10   INR   Result Value Ref Range    INR 2.43 (H) 0.90 - 1.10     Results for orders placed or performed in visit on 11/18/19   Basic Metabolic Panel   Result Value Ref Range    Sodium 141 136 - 145 mmol/L    Potassium 4.2 3.5 - 5.0 mmol/L    Chloride 96 (L) 98 - 107 mmol/L    CO2 35 (H) 22 - 31 mmol/L    Anion Gap, Calculation 10 5 - 18 mmol/L    Glucose 91 70 - 125 mg/dL    Calcium 10.8 (H) 8.5 - 10.5 mg/dL    BUN 23 (H) 8 - 22 mg/dL    Creatinine 1.28 0.70 - 1.30 mg/dL    GFR MDRD Af Amer >60 >60 mL/min/1.73m2    GFR MDRD Non Af Amer 56 (L) >60 mL/min/1.73m2         Lab Results   Component Value Date    WBC 5.8 11/18/2019    HGB 11.8 (L) 11/18/2019    HCT 40.1 11/18/2019    MCV 92 11/18/2019     11/18/2019       No results found for: [id number]  Lab Results   Component Value Date    HGBA1C 6.0 11/11/2019     Lab Results   Component Value Date    INR 2.43 (H) 11/20/2019    INR 1.90 (H) 11/18/2019    INR 1.32 (H) 11/15/2019     Vitamin D, Total (25-Hydroxy)   Date Value Ref Range Status   11/11/2019 41.0  30.0 - 80.0 ng/mL Final     No results found for: TSH        ASSESSMENT/PLAN:    1. S/p T8-T11 spinal fusion, MSSA wound infection, s/p I&D:  Incision c/d/i staples and sutures removed, just one small open area asked nursing to place steri strip. Cefazolin q8h x 6 weeks. RUE PICC line c/d/i. F/u with ID 11/19, reviewed note and recommending repeat thoracic spine MRI prior to stopping antibiotics, elevating CRP and elevated creatinine, continue to monitor night sweats continue IV antibiotics until 12/12, mri 12/2, weekly labs until then. F/u with surgeon on 11/15-sutures and staples removed, doing well, f/u in few weeks. Pain controlled on tylenol three times a day, gabapentin, norco prn.  Max of tylenol 4gm from all sources.  IS q1h while awake. No fevers, night sweats. No concerns today.   2. Frequent falls, Right knee pain: PT, OT. Reports knees and legs buckling, new brace but reports increased pain and too tight. Moving to [facility]. Ambulates with walker. Knee pain,  trolamine cream two times a day and prn. Using norco for knee pain and not back pain, feels pain uncontrolled. changed to 10-325mg q6h prn #20 only, needs to wean off at home. Also increased gabapentin, venous doppler negative. Follow up with pcp after discharge.   3. CAD, s/p drug alluding stent: No chest pain recently. On aspirin, nitro prn. Metoprolol.   4. Diastolic CHF: daily weights [phone]. Baseline weight around 204lbs. 1+ edema, no shortness of breath today. On bumex 2mg two times a day, spironolactone. Heart healthy diet ordered. elier levy. Stable recently.   5. Atrial fibrillation: rate controlled at times 80s. On digoxin, metoprolol, warfarin.  hold parameters.  Previously increased metoprolol to 150mg in am and 100mg in evening. Digoxin level 0.5 on 11/11. Stable. inr on 11/25.   6. Seropositive RA: On abatacept injections q4h weeks, will hold while in tcu. On leflunomide. Prednisone taper.   7. BPH: No concerns at this  time.   8. Depression and anxiety: On duloxetine. Mood stable.   9. Gout: On allopurinol.   10. Vitamin d deficiency: on vitamin d 50,000 u weekly, vit d level 41 on 11/11. Stable.   11. GERD: On lansoprazole.   12. Hypomagnesemia: on mag ox. Mg 1.8 on 11/11. Stable.   13. Hypokalemia: on kcl. k 3.9 on 11/11.   14. Constipation: on miralax daily, senna 2 tabs daily. No recent concerns. Stable.   15. Insomnia: On trazodone at bedtime, ambien at bedtime prn. No further concerns.    16. Research study medication: On 10mg daily, patient has own supply and unknown if on medication or placebo. He is on study through cardiology office for having history of 2 MI and diabetes to see if this reduces risk of recurrent Mis.   17. Type 2 DM: reports diet controlled. No hga1c found in epic system. Reports well managed and checks with pcp appointments. hga1c 6.0 on 11/11 stable. No need for meds at this time.   18. MARYELLEN: previously refused CPAP. No concerns.   19. Obesity: [phone] baseline 204lbs but fluid overloaded. Counseling regarding healthier lifestyle, weight loss, dietary habits, increasing physical activity.   20. CKD stage 3: Cr0.82 on 11/1. Cr 1.00 on 11/11. Stable.   21. Anemia of chronic disease: Hg 11.1 on 11/11. Stable.   22. Dry skin: aquaphor at bedtime scheduled. Needs nail care as well.       Electronically signed by: Cinthya Alonso NP    Total floor/unit time spent 35 min with 25 min spent on counseling and coordination of care. Counseling regarding hospital course, tcu course, med changes, iv antibiotic management, lab monitoring, pain management, infectious disease follow up,mri orders, evaluation of night sweats, need for pcp follow up, discharge instructions. Coordinated care with nursing, therapy,  for discharge planning, services for discharge, med orders for discharge, primary care follow up, ID follow up, lab monitoring, iv antibiotic regimen for home.     Please  "evaluate Noe Mason for admission to Home Health.    Face to Face Attestation and Initial Plan of Care    The face-to-face encounter occurred on date: 11/21/19  Face to Face encounter was with: Cinthya Radke    Please provide brief clinical summary of reason for visit and need for home care. Deconditioning after hospital course for wound infection, sepsis, thoracic fusion    Please identify which of the following home health disciplines the patient will need AND describe the skilled services that you would like the home health agency to perform: SKILLED NURSING (RN): complex med management, teach wound care and IV Home Infusion, PHYSICAL THERAPY: strength training and gait training, OCCUPATIONAL THERAPY: ADLs and home safety and HOME HEALTH AIDE    Homebound Status (describe the functional limitations that support this patient is confined to his/her home. Medicaid recipients are not required to be homebound.):assistive device needed:  2WW, due to infection and iv antibiotics    Name of physician who will be responsible for the ongoing home health plan of care (CMS requires the referring physician to provide the specific name of the community physician instead of a title, such as \"PCP\"): Lyle Navarrete MD    Requested Start of Care Date: Within 48 hours    Other information to assist the home health agency in developing the initial Plan of Care:    I certify that services are/were furnished while this patient was under the care of a physician and that a physician or an allowed non-physician practitioner (NPP), had a face-to-face encounter that meets the physician face-to-face encounter requirements. The encounter was in whole, or in part, related to the primary reason for home health. The patient is confined to his/her home and needs intermittent skilled nursing, physical therapy, speech-language pathology, or the continued need for occupational therapy. A plan of care has been established by a physician " and is periodically reviewed by a physician.    Post Discharge Medication Reconciliation Status: discharge medications reconciled, continue medications without change

## 2021-06-03 NOTE — PROGRESS NOTES
[facility]    DATE: 2019    NAME:  Noe Mason             :  1950  MRN: [id number]  CODE STATUS:  FULL CODE    VISIT TYPE: Problem Visit (pain)     FACILITY:  [facility] [[id number]]       CHIEF COMPLAIN/REASON FOR VISIT:    Chief Complaint   Patient presents with     Problem Visit     pain               HISTORY OF PRESENT ILLNESS: Noe Mason is a 69 y.o. male who was admitted  for T8-T11 spinal fusion due to unstable T9-10 fracture after multiple falls. He then presented to ED at [facility] on 10/28 for wound infection. He underwent I&D on 10/29 and cultures grew MSSA. ID consulted and changed vanco to cefazolin. This was recommended for 6 weeks. He had post op anemia but did not require any blood transfusions. He had venous dopplers of ble that were negative for dvt. F/u with ID in 2 weeks. He was recommended to transfer to TCU for further rehab. He has PMH of frequent falls, CAD s/p stenting, Diastolic CHF, A fib on chronic warfarin, renal infarction s/p emboli, seropositive RA, Gout, MARYELLEN, BPH, urinary retention, depression and anxiety. Prior to this he lived with wife in two story house. He is retired from [employer].     Today Mr. Mason is seen for follow up on pain. He reports that he is having more pain in his right knee. He says he got a brace but it is too tight. He says the pain is in his knee and thigh area. He has previously had this pain it just seems worse recently. He has been worked up by several providers and was told he had arthritis but they could not find anything really wrong with his back.  He says he had an EMG even of this leg and did not show anything. He has had a lot of problems with his right arm and shoulder too and wondering if he can have an order for hot packs. This helps with his shoulder pain. He says that he previously had an order to do an EMG of his right arm but he has not scheduled this yet. He is  not sure he wants to do that. He is still having night sweats but says he was reassured that he has not had any fevers and his blood cultures were negative. He is going to see ID today. He plans to mention this to them today. He says he is no longer moving to assisted living and is planning to return home because he and his wife decided it was too expensive. He says he still has some black spots on his skin. He is still using the norco for pain but more for his knee pain than anything else. He had his stitches and staples removed from his back incision so this feels better now. He does think his right leg is more swollen than it has been before. He says therapy had talked about being done 11/20 but now they are saying longer. He thinks he may be able to go home at that time. He is not sure he will stay until he is done with the antibiotics but he is going to discuss this with his infectious disease doctor. He is wondering if he can be done with lovenox and discussed his inr yesterday was 1.9 so will stop this at this time and continue the 5mg of coumadin. We will recheck his inr on 11/20.     REVIEW OF SYSTEMS:  PROBLEMS AND REVIEW OF SYSTEMS:   Today on ROS:   Currently, no fever, chills, or rigors. Decreased vision-wears glasses. Denies any chest pain, headaches, palpitations, lightheadedness, dizziness, shortness of breath, or cough. Appetite is good. Denies any GERD symptoms. Denies any difficulty with swallowing, nausea, or vomiting.  Denies any abdominal pain, diarrhea or constipation. Denies any urinary symptoms. No insomnia. No active bleeding. No rash. Positive for ambulates with walker, back incision, pain controlled in back but worse in right knee, right knee brace, leg swelling improved, night sweats, but no fevers or chills, iv antibiotics, picc line, dry skin, right shoulder pain      Allergies   Allergen Reactions     Atorvastatin      Muscle pain     Lisinopril Cough     Current Outpatient  Medications   Medication Sig     acetaminophen (TYLENOL) 325 MG tablet Take 650 mg by mouth every 6 (six) hours as needed for pain.     allopurinol (ZYLOPRIM) 300 MG tablet Take 300 mg by mouth daily.     aspirin 81 mg chewable tablet Chew 81 mg daily.            bumetanide (BUMEX) 2 MG tablet Take 2 mg by mouth 2 (two) times a day at 9am and 6pm.     cefazolin sodium/dextrose,iso (CEFAZOLIN IN DEXTROSE, ISO-OS,) 2 gram/50 mL PgBk Infuse 2 g into a venous catheter every 8 (eight) hours.     chlorhexidine gluconate 2 % Liqd Apply topically once a week. Apply to PICC  Site topically one time a day every 7 day(s) for PICC  Site Care - change dressing every 7 days Clean site  with Chlorhexidrine Gluconate 2%; use securement  device; apply impregnated anti-microbial disc to insert  site; cover with a transparent semipermeable  membrane     digoxin (LANOXIN) 125 mcg tablet Take 125 mcg by mouth daily.     DULoxetine (CYMBALTA) 60 MG capsule Take 60 mg by mouth daily.     ergocalciferol (ERGOCALCIFEROL) 50,000 unit capsule Take 50,000 Units by mouth once a week.     gabapentin (NEURONTIN) 100 MG capsule Take 600 mg by mouth 3 (three) times a day.            HYDROcodone-acetaminophen 5-325 mg per tablet Take 2 tablets by mouth every 6 (six) hours as needed for pain.            lansoprazole (PREVACID) 30 MG capsule Take 30 mg by mouth daily.      [START ON 12/13/2019] leflunomide (ARAVA) 20 MG tablet Take 20 mg by mouth daily.     metoprolol tartrate (LOPRESSOR) 50 MG tablet Take 100 mg by mouth 2 (two) times a day. 150mg in am and 100mg in evening           predniSONE (DELTASONE) 1 MG tablet Take by mouth Take 4 Tablets by mouth daily for 14 days, THEN 3 Tablets daily for 14 days, THEN 2 Tablets daily for 14 days, THEN 1 Tablet daily for 14 days .     spironolactone (ALDACTONE) 25 MG tablet Take 12.5 mg by mouth daily.     traZODone (DESYREL) 50 MG tablet Take 50 mg by mouth at bedtime.     trolamine salicylate (ASPERCREME)  10 % cream Apply 1 application topically 2 (two) times a day. And q6h prn     warfarin sodium (WARFARIN ORAL) Take by mouth. 11/20/19 INR 2.43 Cont 5mg daily. NExt INR 11/25 by [facility].  11/18/19 INR 1.90  DC Lovenox.  Continue 5mg daily.  Next INR 11/20/19.    11/15/19 INR 1.32  Cont 5mg daily and Lovenox 100mg two times a day.  Next INR 11/18/19.           white petrolatum (AQUAPHOR ORIGINAL) 41 % Oint Apply 1 application topically at bedtime.     zolpidem (AMBIEN) 10 mg tablet Take 10 mg by mouth at bedtime.            Past Medical History:    Past Medical History:   Diagnosis Date     (HFpEF) heart failure with preserved ejection fraction (H)      A-fib (H)      CAD (coronary artery disease)      Depression with anxiety      Diverticulosis      Gout      MARYELLEN (obstructive sleep apnea)      RA (rheumatoid arthritis) (H)      Renal infarction (H)            PHYSICAL EXAMINATION  Vitals:    11/18/19 2219   BP: 120/74   Pulse: (!) 103   Resp: 18   Temp: 99  F (37.2  C)   SpO2: 97%   Weight: 214 lb (97.1 kg)       Today on physical exam:     GENERAL: Awake, Alert, oriented x3, not in any form of acute distress, answers questions appropriately, follows simple commands, conversant, obese  HEENT: Head is normocephalic with normal hair distribution. No evidence of trauma. Ears: No acute purulent discharge. Eyes: Conjunctivae pink with no scleral jaundice. Nose: Normal mucosa and septum. NECK: Supple with no cervical or supraclavicular lymphadenopathy. Trachea is midline. glasses  CHEST: No tenderness or deformity, no crepitus  LUNG: dim to auscultation with good chest expansion. There are no crackles or wheezes, normal AP diameter. No shortness of breath or cough  BACK: midline thoracic incision staples/sutures removed c/d/i  CVS: irregularly irregular rhythm, there are no murmurs, rubs, gallops, or heaves,  2+ pulses symmetric in all extremities.   ABDOMEN: Rounded and soft, nontender to palpation, non distended, no masses,  no organomegaly, good bowel sounds, no rebound or guarding, no peritoneal signs.   EXTREMITIES: 1+ ble edema, tubigrips, RUE PICC line c/d/i, no erythema, edema, warmth, right knee 1+ edema  SKIN: Warm and dry,dry cracked skin on bilateral feet  NEUROLOGICAL: The patient is oriented to person, place and time. Strength and sensation are grossly intact. Face is symmetric.            LABS:   Recent Results (from the past 168 hour(s))   INR   Result Value Ref Range    INR 1.32 (H) 0.90 - 1.10   Alkaline Phosphatase   Result Value Ref Range    Alkaline Phosphatase 145 (H) 45 - 120 U/L   Basic Metabolic Panel   Result Value Ref Range    Sodium 141 136 - 145 mmol/L    Potassium 4.2 3.5 - 5.0 mmol/L    Chloride 96 (L) 98 - 107 mmol/L    CO2 35 (H) 22 - 31 mmol/L    Anion Gap, Calculation 10 5 - 18 mmol/L    Glucose 91 70 - 125 mg/dL    Calcium 10.8 (H) 8.5 - 10.5 mg/dL    BUN 23 (H) 8 - 22 mg/dL    Creatinine 1.28 0.70 - 1.30 mg/dL    GFR MDRD Af Amer >60 >60 mL/min/1.73m2    GFR MDRD Non Af Amer 56 (L) >60 mL/min/1.73m2   C-Reactive Protein   Result Value Ref Range    CRP 6.6 (H) 0.0 - 0.8 mg/dL   HM1 (CBC with Diff)   Result Value Ref Range    WBC 5.8 4.0 - 11.0 thou/uL    RBC 4.35 (L) 4.40 - 6.20 mill/uL    Hemoglobin 11.8 (L) 14.0 - 18.0 g/dL    Hematocrit 40.1 40.0 - 54.0 %    MCV 92 80 - 100 fL    MCH 27.1 27.0 - 34.0 pg    MCHC 29.4 (L) 32.0 - 36.0 g/dL    RDW 16.4 (H) 11.0 - 14.5 %    Platelets 324 140 - 440 thou/uL    MPV 11.4 8.5 - 12.5 fL    Neutrophils % 56 50 - 70 %    Lymphocytes % 29 20 - 40 %    Monocytes % 9 2 - 10 %    Eosinophils % 5 0 - 6 %    Basophils % 1 0 - 2 %    Neutrophils Absolute 3.3 2.0 - 7.7 thou/uL    Lymphocytes Absolute 1.7 0.8 - 4.4 thou/uL    Monocytes Absolute 0.5 0.0 - 0.9 thou/uL    Eosinophils Absolute 0.3 0.0 - 0.4 thou/uL    Basophils Absolute 0.1 0.0 - 0.2 thou/uL   INR   Result Value Ref Range    INR 1.90 (H) 0.90 - 1.10   INR   Result Value Ref Range    INR 2.43 (H) 0.90 - 1.10      Results for orders placed or performed in visit on 11/18/19   Basic Metabolic Panel   Result Value Ref Range    Sodium 141 136 - 145 mmol/L    Potassium 4.2 3.5 - 5.0 mmol/L    Chloride 96 (L) 98 - 107 mmol/L    CO2 35 (H) 22 - 31 mmol/L    Anion Gap, Calculation 10 5 - 18 mmol/L    Glucose 91 70 - 125 mg/dL    Calcium 10.8 (H) 8.5 - 10.5 mg/dL    BUN 23 (H) 8 - 22 mg/dL    Creatinine 1.28 0.70 - 1.30 mg/dL    GFR MDRD Af Amer >60 >60 mL/min/1.73m2    GFR MDRD Non Af Amer 56 (L) >60 mL/min/1.73m2         Lab Results   Component Value Date    WBC 5.8 11/18/2019    HGB 11.8 (L) 11/18/2019    HCT 40.1 11/18/2019    MCV 92 11/18/2019     11/18/2019       No results found for: [id number]  Lab Results   Component Value Date    HGBA1C 6.0 11/11/2019     Lab Results   Component Value Date    INR 2.43 (H) 11/20/2019    INR 1.90 (H) 11/18/2019    INR 1.32 (H) 11/15/2019     Vitamin D, Total (25-Hydroxy)   Date Value Ref Range Status   11/11/2019 41.0 30.0 - 80.0 ng/mL Final     No results found for: TSH        ASSESSMENT/PLAN:    1. S/p T8-T11 spinal fusion, MSSA wound infection, s/p I&D:  Incision c/d/i staples and sutures removed. Cefazolin q8h x 6 weeks. RUE PICC line c/d/i. F/u with ID 11/19, reviewed note and recommending repeat thoracic spine MRI prior to stopping antibiotics, elevating CRP and elevated creatinine, continue to monitor night sweats continue IV antibiotics until 12/12, mri 12/2, weekly labs until then. F/u with surgeon on 11/15-sutures and staples removed, doing well, f/u in few weeks. Pain controlled on tylenol three times a day, gabapentin, norco prn.  Max of tylenol 4gm from all sources.  IS q1h while awake.   No further fevers but continues to have night sweats. Blood cultures negative from last week. Follow sup with Id today. Discussing returning home.   2. Frequent falls, Right knee pain: PT, OT. Reports knees and legs buckling, new brace but reports increased pain and too tight. Moving  to [place]. Ambulates with walker. Knee pain,  trolamine cream two times a day and prn. Using norco for knee pain and not back pain. Encouraged to wean off and will change to 10-325mg q5h prn #20 only for a few days only to overcome increased knee pain. Due to increased edema in rle today will order venous doppler to rule out dvt. Increase gabapentin to 600mg three times a day. Warm packs prn.   3. CAD, s/p drug alluding stent: No chest pain recently. On aspirin, nitro prn. Metoprolol.   4. Diastolic CHF: daily weights [phone]. Baseline weight around 204lbs. 1+ edema, no shortness of breath today. On bumex 2mg two times a day, spironolactone. Heart healthy diet ordered. elier levy. Stable recently.   5. Atrial fibrillation: rate controlled at times 90s. On digoxin, metoprolol.  hold parameters. Stopped lovenox yesterday, inr 1.9, continue coumadin 5mg daily. Recheck inr on 11/20. Previously increased metoprolol to 150mg in am and 100mg in evening. Digoxin level 0.5 on 11/11. Stable.   6. Seropositive RA: On abatacept injections q4h weeks, will hold while in tcu. On leflunomide held until 11/13. Prednisone taper.   7. BPH: No concerns at this time.   8. Depression and anxiety: On duloxetine. Mood stable.   9. Gout: On allopurinol.   10. Vitamin d deficiency: on vitamin d 50,000 u weekly, vit d level 41 on 11/11. Stable.   11. GERD: On lansoprazole.   12. Hypomagnesemia: on mag ox. Mg 1.8 on 11/11. Stable.   13. Hypokalemia: on kcl. k 3.9 on 11/11.   14. Constipation: on miralax daily, senna 2 tabs daily. No recent concerns. Stable.   15. Insomnia: On trazodone at bedtime, ambien at bedtime prn. No further concerns.    16. Research study medication: On 10mg daily, patient has own supply and unknown if on medication or placebo. He is on study through cardiology office for having history of 2 MI and diabetes to see if this reduces risk of recurrent Mis.   17. Type 2 DM: reports diet controlled. No hga1c found  in epic system. Reports well managed and checks with pcp appointments. hga1c 6.0 on 11/11 stable. No need for meds at this time.   18. MARYELLEN: previously refused CPAP. No concerns.   19. Obesity: [phone] baseline 204lbs but fluid overloaded. Counseling regarding healthier lifestyle, weight loss, dietary habits, increasing physical activity.   20. CKD stage 3: Cr0.82 on 11/1. Cr 1.00 on 11/11. Stable.   21. Anemia of chronic disease: Hg 11.1 on 11/11. Stable.   22. Dry skin: aquaphor at bedtime scheduled. Needs nail care as well.     Weekly labs    Per therapy firm 11/25 mod ind for adls, toileting sup, 200 feet with sup, stairs with stand by assist, knee brace needs refitting-he is contacting orthotist clinic. 27 slums. Pt, ot, hha. May skill until complete iv antibiotics.     Electronically signed by: Cinthya Alonso NP      Total floor/unit time spent 35 min with 25 min spent on counseling and coordination of care. Counseling regarding pain management, right knee pain evaluation, night sweats workup, need for venous doppler, knee brace, change in pain medicine, change in gabapentin, iv antibiotic management. Coordinated care with nursing for management of iv antibiotics, sepsis, wound infection, surgical follow up, ID follow up, lab monitoring, pain management.

## 2021-06-03 NOTE — PROGRESS NOTES
[facility]    DATE: 2019    NAME:  Noe Mason             :  1950  MRN: [id number]  CODE STATUS:  FULL CODE    VISIT TYPE: Problem Visit     FACILITY:  [facility] [[id number]]       CHIEF COMPLAIN/REASON FOR VISIT:    Chief Complaint   Patient presents with     Problem Visit               HISTORY OF PRESENT ILLNESS: Noe Mason is a 69 y.o. male who was admitted  for T8-T11 spinal fusion due to unstable T9-10 fracture after multiple falls. He then presented to ED at [facility] on 10/28 for wound infection. He underwent I&D on 10/29 and cultures grew MSSA. ID consulted and changed vanco to cefazolin. This was recommended for 6 weeks. He had post op anemia but did not require any blood transfusions. He had venous dopplers of ble that were negative for dvt. F/u with ID in 2 weeks. He was recommended to transfer to TCU for further rehab. He has PMH of frequent falls, CAD s/p stenting, Diastolic CHF, A fib on chronic warfarin, renal infarction s/p emboli, seropositive RA, Gout, MARYELLEN, BPH, urinary retention, depression and anxiety. Prior to this he lived with wife in two story house. He is retired from [employer].     Today Mr. Mason is seen for concerns of low grade temps over weekend. His surgeon was notified and ordered to monitor incision and call if temp >101. He had temps of  over weekend. On exam he says he would wake up with night sweats and soak the bed at night and also have chills. He started this a few days ago. He is also worried because he was having pain in his picc line. He thought his arm was swollen but the staff did not think so. He is using the norco and feels his pain is controlled on this. He has an appointment with spine surgeon on 11/15 and ID on . He would like his incision looked at today. Otherwise his bowels are moving ok and no issues breathing. His weights are down 222-214lbs. His baseline weight is  around 204lbs so discussed this and he feels a lot of this is water weight. He is having a lot of knee pain with therapy. He is also taking norco for this and we discussed using ice and topical agents. He uses bengay at home and we discussed using aspercreme here. He says his feet are very dry and needs some nailcare. He is requesting something for this.     REVIEW OF SYSTEMS:  PROBLEMS AND REVIEW OF SYSTEMS:   Today on ROS:   Currently, no fever, chills, or rigors. Decreased vision-wears glasses. Denies any chest pain, headaches, palpitations, lightheadedness, dizziness, shortness of breath, or cough. Appetite is good. Denies any GERD symptoms. Denies any difficulty with swallowing, nausea, or vomiting.  Denies any abdominal pain, diarrhea or constipation. Denies any urinary symptoms. No insomnia. No active bleeding. No rash. Positive for ambulates with walker, back incision, pain controlled, leg swelling improved, weight loss, night sweats, fevers, chills      Allergies   Allergen Reactions     Atorvastatin      Muscle pain     Lisinopril Cough     Current Outpatient Medications   Medication Sig     trolamine salicylate (ASPERCREME) 10 % cream Apply 1 application topically 2 (two) times a day. And q6h prn     white petrolatum (AQUAPHOR ORIGINAL) 41 % Oint Apply 1 application topically at bedtime.     acetaminophen (TYLENOL) 325 MG tablet Take 650 mg by mouth every 6 (six) hours as needed for pain.     allopurinol (ZYLOPRIM) 300 MG tablet Take 300 mg by mouth daily.     aspirin 81 mg chewable tablet Chew 81 mg daily.            bumetanide (BUMEX) 2 MG tablet Take 2 mg by mouth 2 (two) times a day at 9am and 6pm.     cefazolin sodium/dextrose,iso (CEFAZOLIN IN DEXTROSE, ISO-OS,) 2 gram/50 mL PgBk Infuse 2 g into a venous catheter every 8 (eight) hours.     chlorhexidine gluconate 2 % Liqd Apply topically once a week. Apply to PICC  Site topically one time a day every 7 day(s) for PICC  Site Care - change dressing  every 7 days Clean site  with Chlorhexidrine Gluconate 2%; use securement  device; apply impregnated anti-microbial disc to insert  site; cover with a transparent semipermeable  membrane     cholecalciferol, vitamin D3, 1,000 unit (25 mcg) tablet Take 2,000 Units by mouth daily.     digoxin (LANOXIN) 125 mcg tablet Take 125 mcg by mouth daily.     DULoxetine (CYMBALTA) 60 MG capsule Take 60 mg by mouth daily.     [START ON 11/13/2019] enoxaparin (LOVENOX) 100 mg/mL Syrg Inject 100 mg under the skin every 12 (twelve) hours.     ergocalciferol (ERGOCALCIFEROL) 50,000 unit capsule Take 50,000 Units by mouth once a week.     gabapentin (NEURONTIN) 100 MG capsule Take 500 mg by mouth 3 (three) times a day.     HYDROcodone-acetaminophen 5-325 mg per tablet Take 1-2 tablets by mouth every 4 (four) hours as needed for pain.            lansoprazole (PREVACID) 30 MG capsule Take 30 mg by mouth daily.      [START ON 12/13/2019] leflunomide (ARAVA) 20 MG tablet Take 20 mg by mouth daily.     metoprolol tartrate (LOPRESSOR) 50 MG tablet Take 100 mg by mouth 2 (two) times a day. 150mg in am and 100mg in evening           nitroglycerin (NITROSTAT) 0.4 MG SL tablet Place 0.4 mg under the tongue every 5 (five) minutes as needed for chest pain.     polyethylene glycol (MIRALAX) 17 gram packet Take 17 g by mouth daily.     predniSONE (DELTASONE) 1 MG tablet Take by mouth Take 4 Tablets by mouth daily for 14 days, THEN 3 Tablets daily for 14 days, THEN 2 Tablets daily for 14 days, THEN 1 Tablet daily for 14 days .     senna (SENOKOT) 8.6 mg tablet Take 2 tablets by mouth daily.     spironolactone (ALDACTONE) 25 MG tablet Take 12.5 mg by mouth daily.     traZODone (DESYREL) 50 MG tablet Take 50 mg by mouth at bedtime.     [START ON 11/13/2019] warfarin (COUMADIN) 2.5 MG tablet Take 5 mg by mouth daily. inr on 11/15           zolpidem (AMBIEN) 10 mg tablet Take 10 mg by mouth at bedtime.            Past Medical History:    Past Medical  History:   Diagnosis Date     (HFpEF) heart failure with preserved ejection fraction (H)      A-fib (H)      CAD (coronary artery disease)      Depression with anxiety      Diverticulosis      Gout      MARYELLEN (obstructive sleep apnea)      RA (rheumatoid arthritis) (H)      Renal infarction (H)            PHYSICAL EXAMINATION  Vitals:    11/10/19 2051   BP: 137/77   Pulse: 96   Resp: 18   Temp: 97.7  F (36.5  C)   SpO2: 96%   Weight: 214 lb (97.1 kg)       Today on physical exam:     GENERAL: Awake, Alert, oriented x3, not in any form of acute distress, answers questions appropriately, follows simple commands, conversant, obese  HEENT: Head is normocephalic with normal hair distribution. No evidence of trauma. Ears: No acute purulent discharge. Eyes: Conjunctivae pink with no scleral jaundice. Nose: Normal mucosa and septum. NECK: Supple with no cervical or supraclavicular lymphadenopathy. Trachea is midline. glasses  CHEST: No tenderness or deformity, no crepitus  LUNG: dim to auscultation with good chest expansion. There are no crackles or wheezes, normal AP diameter. No shortness of breath or cough  BACK: midline thoracic incision staples and sutures intact, mild erythema, no drainage, no warmth  CVS: irregularly irregular rhythm, there are no murmurs, rubs, gallops, or heaves,  2+ pulses symmetric in all extremities.   ABDOMEN: Rounded and soft, nontender to palpation, non distended, no masses, no organomegaly, good bowel sounds, no rebound or guarding, no peritoneal signs.   EXTREMITIES: 1+ ble edema, tubigrips, RUE PICC line c/d/i  SKIN: Warm and dry,dry cracked skin on bilateral feet  NEUROLOGICAL: The patient is oriented to person, place and time. Strength and sensation are grossly intact. Face is symmetric.            LABS:   Recent Results (from the past 168 hour(s))   Basic Metabolic Panel   Result Value Ref Range    Sodium 143 136 - 145 mmol/L    Potassium 3.9 3.5 - 5.0 mmol/L    Chloride 100 98 - 107  mmol/L    CO2 34 (H) 22 - 31 mmol/L    Anion Gap, Calculation 9 5 - 18 mmol/L    Glucose 90 70 - 125 mg/dL    Calcium 9.9 8.5 - 10.5 mg/dL    BUN 11 8 - 22 mg/dL    Creatinine 0.93 0.70 - 1.30 mg/dL    GFR MDRD Af Amer >60 >60 mL/min/1.73m2    GFR MDRD Non Af Amer >60 >60 mL/min/1.73m2   C-Reactive Protein (CRP)   Result Value Ref Range    CRP 5.5 (H) 0.0 - 0.8 mg/dL   Magnesium   Result Value Ref Range    Magnesium 1.9 1.8 - 2.6 mg/dL   Vitamin D, Total (25-Hydroxy)   Result Value Ref Range    Vitamin D, Total (25-Hydroxy) 31.6 30.0 - 80.0 ng/mL   Digoxin (Lanoxin )   Result Value Ref Range    Digoxin 0.5 0.5 - 2.0 ng/mL   INR   Result Value Ref Range    INR 1.08 0.90 - 1.10   HM1 (CBC with Diff)   Result Value Ref Range    WBC 6.8 4.0 - 11.0 thou/uL    RBC 3.83 (L) 4.40 - 6.20 mill/uL    Hemoglobin 10.3 (L) 14.0 - 18.0 g/dL    Hematocrit 35.6 (L) 40.0 - 54.0 %    MCV 93 80 - 100 fL    MCH 26.9 (L) 27.0 - 34.0 pg    MCHC 28.9 (L) 32.0 - 36.0 g/dL    RDW 16.1 (H) 11.0 - 14.5 %    Platelets 303 140 - 440 thou/uL    MPV 10.6 8.5 - 12.5 fL    Neutrophils % 63 50 - 70 %    Lymphocytes % 22 20 - 40 %    Monocytes % 11 (H) 2 - 10 %    Eosinophils % 3 0 - 6 %    Basophils % 1 0 - 2 %    Neutrophils Absolute 4.3 2.0 - 7.7 thou/uL    Lymphocytes Absolute 1.5 0.8 - 4.4 thou/uL    Monocytes Absolute 0.7 0.0 - 0.9 thou/uL    Eosinophils Absolute 0.2 0.0 - 0.4 thou/uL    Basophils Absolute 0.1 0.0 - 0.2 thou/uL   HM1 (CBC with Diff)   Result Value Ref Range    WBC 6.5 4.0 - 11.0 thou/uL    RBC 4.09 (L) 4.40 - 6.20 mill/uL    Hemoglobin 11.1 (L) 14.0 - 18.0 g/dL    Hematocrit 37.5 (L) 40.0 - 54.0 %    MCV 92 80 - 100 fL    MCH 27.1 27.0 - 34.0 pg    MCHC 29.6 (L) 32.0 - 36.0 g/dL    RDW 16.0 (H) 11.0 - 14.5 %    Platelets 286 140 - 440 thou/uL    MPV 11.1 8.5 - 12.5 fL    Neutrophils % 62 50 - 70 %    Lymphocytes % 21 20 - 40 %    Monocytes % 10 2 - 10 %    Eosinophils % 5 0 - 6 %    Basophils % 1 0 - 2 %    Neutrophils Absolute  4.1 2.0 - 7.7 thou/uL    Lymphocytes Absolute 1.4 0.8 - 4.4 thou/uL    Monocytes Absolute 0.7 0.0 - 0.9 thou/uL    Eosinophils Absolute 0.3 0.0 - 0.4 thou/uL    Basophils Absolute 0.1 0.0 - 0.2 thou/uL   C-Reactive Protein   Result Value Ref Range    CRP 4.2 (H) 0.0 - 0.8 mg/dL   Alkaline Phosphatase, Total   Result Value Ref Range    Alkaline Phosphatase 131 (H) 45 - 120 U/L   Glycosylated Hemoglobin A1c   Result Value Ref Range    Hemoglobin A1c 6.0 4.2 - 6.1 %   Digoxin (Lanoxin )   Result Value Ref Range    Digoxin 0.5 0.5 - 2.0 ng/mL   Potassium   Result Value Ref Range    Potassium 3.9 3.5 - 5.0 mmol/L   Vitamin D, Total (25-Hydroxy)   Result Value Ref Range    Vitamin D, Total (25-Hydroxy) 41.0 30.0 - 80.0 ng/mL   Magnesium   Result Value Ref Range    Magnesium 1.8 1.8 - 2.6 mg/dL   ALT (SGPT)   Result Value Ref Range    ALT <9 0 - 45 U/L   AST (SGOT)   Result Value Ref Range    AST 15 0 - 40 U/L   Bilirubin, Total   Result Value Ref Range    Bilirubin, Total 0.4 0.0 - 1.0 mg/dL   Bilirubin, Direct   Result Value Ref Range    Bilirubin, Direct 0.2 <=0.5 mg/dL   Blood Urea Nitrogen (BUN)   Result Value Ref Range    BUN 17 8 - 22 mg/dL   Creatinine   Result Value Ref Range    Creatinine 1.00 0.70 - 1.30 mg/dL    GFR MDRD Af Amer >60 >60 mL/min/1.73m2    GFR MDRD Non Af Amer >60 >60 mL/min/1.73m2     Results for orders placed or performed in visit on 11/05/19   Basic Metabolic Panel   Result Value Ref Range    Sodium 143 136 - 145 mmol/L    Potassium 3.9 3.5 - 5.0 mmol/L    Chloride 100 98 - 107 mmol/L    CO2 34 (H) 22 - 31 mmol/L    Anion Gap, Calculation 9 5 - 18 mmol/L    Glucose 90 70 - 125 mg/dL    Calcium 9.9 8.5 - 10.5 mg/dL    BUN 11 8 - 22 mg/dL    Creatinine 0.93 0.70 - 1.30 mg/dL    GFR MDRD Af Amer >60 >60 mL/min/1.73m2    GFR MDRD Non Af Amer >60 >60 mL/min/1.73m2         Lab Results   Component Value Date    WBC 6.5 11/11/2019    HGB 11.1 (L) 11/11/2019    HCT 37.5 (L) 11/11/2019    MCV 92  11/11/2019     11/11/2019       No results found for: [id number]  Lab Results   Component Value Date    HGBA1C 6.0 11/11/2019     Lab Results   Component Value Date    INR 1.08 11/05/2019     Vitamin D, Total (25-Hydroxy)   Date Value Ref Range Status   11/11/2019 41.0 30.0 - 80.0 ng/mL Final     No results found for: TSH        ASSESSMENT/PLAN:    1. S/p T8-T11 spinal fusion, MSSA wound infection, s/p I&D:  Incision c/d/i staples and sutures intact. Cefazolin q8h x 6 weeks. RUE PICC line c/d/i. F/u with ID in 2 weeks on 11/19. F/u with surgeon on 11/15. Pain controlled on tylenol three times a day, gabapentin, norco prn, hydroxyzine prn. Max of tylenol 4gm from all sources.  IS q1h while awake. norco used 1-3 times per day, 3 on 9th, 2 on 10th. Pain controlled. Fevers, chills, low grade temps  over weekend, night sweats. Will order blood cultures x 2 sets one from picc line, having picc line discomfort but no red streaks, no edema noted. Labs today. Incision is healing well with just mild inflammation. Dc hydroxyzine as not using.   2. Frequent falls: PT, OT. Reports knees and legs buckling. Moving to [facility]. Ambulates with walker. Knee pain, will order trolamine cream two times a day and prn.   3. CAD, s/p drug alluding stent: No chest pain recently. On aspirin, nitro prn. Metoprolol.   4. Diastolic CHF: daily weights 222-214lbs. Baseline weight around 204lbs. 1+ edema, no shortness of breath today. On bumex 2mg two times a day, spironolactone. Heart healthy diet ordered. elier levy. Will continue bumex two times a day and spironolactone for now even though weights down as still up from baseline and losing water weight at this time.   5. Atrial fibrillation: rate uncontrolled at times 90s. On digoxin, metoprolol.  hold parameters. On lovenox x 14 days, may restart coumadin 5mg daily on 11/13. INR on 11/15. On lovenox until then. Previously increased metoprolol to 150mg in am and 100mg in evening. Digoxin  level 0.5 on 11/11. Stable.   6. Seropositive RA: On abatacept injections q4h weeks, will hold while in tcu. On leflunomide hold for 2 weeks until 11/13. Prednisone taper.   7. BPH: No concerns at this time.   8. Depression and anxiety: On duloxetine.   9. Gout: On allopurinol.   10. Vitamin d deficiency: on vitamin d 50,000 u weekly, vit d level 41 on 11/11. Stable.   11. GERD: On lansoprazole.   12. Hypomagnesemia: on mag ox. Mg 1.8 on 11/11. Stable.   13. Hypokalemia: on kcl. k 3.9 on 11/11.   14. Constipation: on miralax daily, senna 2 tabs daily. No recent concerns. Will dc stop dates on stool softeners.   15. Insomnia: On trazodone at bedtime, ambien at bedtime prn. Will change ambien to 2200 scheduled.   16. Research study medication: On 10mg daily, patient has own supply and unknown if on medication or placebo. He is on study through cardiology office for having history of 2 MI and diabetes to see if this reduces risk of recurrent Mis.   17. Type 2 DM: reports diet controlled. No hga1c found in epic system. Reports well managed and checks with pcp appointments. hga1c 6.0 on 11/11 stable. No need for meds at this time.   18. MARYELLEN: previously refused CPAP.   19. Obesity: 222-214lbs baseline 204lbs but fluid overloaded. Counseling regarding healthier lifestyle, weight loss, dietary habits, increasing physical activity.   20. CKD stage 3: Cr0.82 on 11/1. Cr 1.00 on 11/11. Stable.   21. Anemia of chronic disease: Hg 11.1 on 11/11. Stable.   22. Dry skin: ordered aquaphor at bedtime scheduled. Needs nail care as well.     Weekly labs      Electronically signed by: Cinthya Alonso NP    Total floor/unit time spent 35 min with 25 min spent on counseling and coordination of care. Counseling regarding pain management, dry skin, nail care, fever evaluation, lab monitoring, edema management, weight loss. Coordinated care regarding pain management, weight loss, fluid overload management, sepsis management, lab monitoring.

## 2021-06-08 NOTE — PROGRESS NOTES
Noe Mason has participated in 26 sessions of Phase II Cardiac Rehab.    Progress Report: Dr. Navarrete  Cardiac Rehab Treatment Progress Report 1/24/2017 1/26/2017 1/31/2017 2/2/2017 2/7/2017   Pre Exercise  HR 76 90-95 76 65 72   Pre Exercise /60 108/66 76/40 100/54 110/48   Treadmill Peak HR  110-120 - 100 -   Treadmill Peak Blood Pressure - - - - -   Nustep Peak Heart Rate    83-93    Nustep Peak Blood Pressure 128/64 112/62 114/58 128/68 118/62   Heart Rate 86-94 91 80-85 70 75   Post Exercise /68 102/64 84/54 98/60 90/60   ECG A-fib with occ PVC's-multifocal  and 1 run of Bigeminy-no s/s VSS A-Fib with occ to frequent Multifocal PVC's, couplet and runs of bigeminy A-Fib w/occ. multifocal PVC's  A-fib with occ. PVC's A-Fib with occ. multifocal PVCs, rare runs of Bigeminy. 3 beat multifocal complex   Total Exercise Minutes 40 35 40 40 45   Current Status:  Currently exercising without complaints or symptoms.    If Physician recommends change in treatment plan, please place orders.        __________________________________________________      _____________  Signature                                                                                                  Date

## 2021-06-08 NOTE — PROGRESS NOTES
ITP ASSESSMENT   Assessment Day: 90 Day  SOC: 11/4/16  Session Number: 24  Precautions: dHF, Hypertrophic CM, Chronic LBP, Right Knee Pain  Diagnosis: MI;CHF;Stent (dCHF/HCM w/preserved EF @55%, POBA if ostial D1, WICHO to LAD)  Risk Stratification: High  Event Date: 10/2/16  Referring Provider: Lyle Navarrete MD  *ITP's sent to PMD  EXERCISE  Exercise Assessment: Reassessment                        Exercise Plan  Goals Next 30 days  ADL'S: Pt states he is independent with ADL's  Leisure: Pt would like to resume snow blowing without fatigue with this activity. (4.5METs), continue to progress MET's in CR to LTG 4.5-5 METs to mock activity's such as yardwork in spring/summer Mowing 4.5-5METs.   Work: Retired  Education Goals: All goals in this section met  Education Goals Met: Medication review.;Has system for taking medication.;Patient can state cardiac s/s and appropriate emergency response.                      Goals Met  60 day ADL'S goals met: Pt continues to be independent with ADL's   60 day Leisure goals met: Pt occ continues to feel challenged with increase METs on TM c/o Fatigue and occ SOB.   60 day Work goals met: Retired  60 Day Progression: Pt has progressed  from 3.3 to 2.7-3.9METs. Pt is limited with progression due to   30 day ADL'S goals met: Pt tolerating grocery shopping  30 day Leisure goals met: Pt walking regularly on non-rehab days  30 day Work goals met: Retired  30 Day Progression: Pt has progressed to 3.3 METS  Initial ADL's goals met: Has resumed putting on socks without SOB.  Initial Leisure goals met: Has resumed climbing stairs without SOB.  Intial Work goals met: Retired.  Initial Progression: Pt somewhat limited by R knee pain, but just rec'd cortisone shot.  Exercise Prescription  Exercise Mode: Treadmill;Bike;Nustep;Arm Erg.;Stairs;Hallway Walking  Frequency: 2x/week  Duration: 40-45 min  Intensity / THR: 20-30 beats above resting heart rate  RPE 11-14  Progression / Met level:  "2.6-3.9  Resistive Training?: Yes  Current Exercise (mins/week): 115  Interventions  Home Exercise:  Mode: Walking, Cals, Wts  Frequency: 2-4x/week  Duration: 30min  Education Material : Educational videos;Provide written material;Individual education and counseling;Offer educational classes  Education Completed  Exercise Education Completed: Cardiac Anatomy;Signs and Symptoms;Medication review;RPE;Emergency Plan;Home Exercise;Warm up/cool down;FITT Principles;BP/HR Reponse to exercise;Benefits of Exercise;End point of exercise;Stretching;Strength training          Exercise Follow-up/Discharge  Follow up/Discharge: Pt will continue to progress duration with HEP up to 30' and if needed to do in shorter increments 15' x2/Day on non-rehab days.  NUTRITION  Nutrition Assessment: Reassessment  Nutrition Risk Factors:  Nutrition Risk Factors: Dyslipidemia;Overweight;Diabetes (Pre-Diabetes)  HbA1c: ? (need recent results)  Monitors blood sugar at home: No  Cholesterol: ? (need recent results)  LDL: ?  HDL: ?  Triglycerides: ?  Nutrition Plan  Interventions  Nutrition Interventions: Diet consult;Diet class;Therapist/Patient discussion;Provide with written material  Rate Your Plate Score: 62  Education Completed  Nutrition Education Completed: Low Saturated fat diet;Risk factor overview;Low sodium diet;Weight management;Discussed small frequent meals and nutritional supplements  Goals  Nutrition Goals (Next 30 days): Patient knows appropriate portion size;Patient will lose weight  Goals Met  Nutrition Goals Met: Patient can identify their risk factors for CAD;Patient follows a low sodium diet;Completed Nutritional Risk Screen;Provided Rate your Plate Survey;Patient states following a low saturated fat diet;Patient knows appropriate portion size;Reviewed Dietitian schedule  Height, Weight, and  BMI  Weight: 257 lb 3.2 oz (116.7 kg)  Height: 5' 9\" (1.753 m)  BMI: 37.96  Nutrition Follow-up  Follow-up/Discharge: Pt met with " dietician. Following low fat, low salt and stated he stated that he follows a heart healthy diet.        Other Risk Factors  Other Risk Factor Assessment: Reassessment  HTN Risk Factor: Hypertension  Pre Exercise BP: 76/40 (84/58-no s/s pt became slightly lightheaded/resolved 92/58)  Post Exercise BP: 84/54 (no cv s/s)  Hypertension Plan  Goals  HTN Goals: Patient demonstrates understanding of HTN, no goals identified for the next 30 days  Goals Met  HTN Goals Met: Take medication as prescribed;Exercises regularly;Follow low sodium diet  HTN Interventions  HTN Interventions: Diet consult;Therapist/patient discussion;Provide written material;Offer educational videos;Offer educational classes  HTN Education Completed  HTN Education Completed: Low sodium diet;Medication review;Risk factor overview  Tobacco Risk Factor: NA  Risk Factor Follow-up   Follow-up/Discharge: CRF: Family Hx, Inactivity-Pt understands CRF modifcation.  PSYCHOSOCIAL  Psychosocial Assessment: Reassessment   Psychosocial Risk Factor: Stress  Psychosocial Plan  Interventions  Interventions: Offer educational videos and classes;Provide written material;Individual education and counseling  Education Completed  Education Completed: Effects of stress on body;S/S of depression;Relaxation/Coping Techniques  Goals  Goals (Next 30 days): Improvement in [employer] COOP score  Goals Met  Goals Met: Identified Support system;Oriented to stress management classes;Identify stressors;Practicing stress management skills  Psychosocial Follow-up  Follow-up/Discharge: Pt states that he has a great support system and someone to talk with about concerns at this time. Pt also takes walks to help relieve his stress.    Patient involved in Goal setting?: Yes  Pt. Is appropriate for Phase 2 cardiac rehab.  Pt has reached a 2.7-3.9 MET level. Long term goal is to reach 4.5-5 METs. We will continue to progress pt as tolerated.  Please add/ Change the following in patients  Cardiac Rehab Program:  ________________________________________________________  ________________________________________________________    MD Signature:_____________________________________Date:_______Time:_____    FYI: To see document flowsheet for cardiac rehab. (e.g. BP's, HR, EKG-summary and/or other data). This is available in Epic under Chart Review, Encounter, Date/CR  and scroll towards bottom of page for the OP CR Daily Document Flowsheet and you will see there CR Exercise data.    Note: that rhythm strips are available in CR as a hard/paper chart not in EPIC until the pt is discharged from program. So if you would like to request EKG's please call CR at [phone] and we will gladly send you rhythm strips during rest/exercise.

## 2021-06-08 NOTE — PROGRESS NOTES
Noe Ramirezson has participated in 27 sessions of Phase II Cardiac Rehab.    Progress Report:   Cardiac Rehab Treatment Progress Report 1/26/2017 1/31/2017 2/2/2017 2/7/2017 2/9/2017   Pre Exercise  HR 90-95 76 65 72 81   Pre Exercise /66 76/40 100/54 110/48 100/48   Treadmill Peak -120 - 100 - 109-118   Treadmill Peak Blood Pressure - - - - -   Nustep Peak Heart Rate   83-93     Nustep Peak Blood Pressure 112/62 114/58 128/68 118/62 122/60   Heart Rate 91 80-85 70 75 80   Post Exercise /64 84/54 98/60 90/60 98/54   ECG A-Fib with occ to frequent Multifocal PVC's, couplet and runs of bigeminy A-Fib w/occ. multifocal PVC's  A-fib with occ. PVC's A-Fib with occ. multifocal PVCs, rare runs of Bigeminy. 3 beat multifocal complex A-fib with occasional PVC's - short runs of bigemeny- pt asymptomatic.    Total Exercise Minutes 35 40 40 45 40         Current Status:  Symptomatic Groin pain and B knee pain,Winded on the TM. O2 has remained 94-95% RA with exercise.  and Therapists Comments: Continue to progress as tolerated.     If Physician recommends change in treatment plan, please place orders.        __________________________________________________      _____________  Signature                                                                                                  Date

## 2021-06-08 NOTE — PROGRESS NOTES
"Noe Mason has participated in 22 sessions of Phase II Cardiac Rehab.    Progress Report: Dr. Navarrete  Cardiac Rehab Treatment Progress Report 1/10/2017 1/12/2017 1/17/2017 1/19/2017 1/24/2017   Pre Exercise  HR 79 64-74 68 77 76   Pre Exercise BP 92/60 82/50 94/60 98/60 102/60   Treadmill Peak -126 90's-100's 120's-130     Treadmill Peak Blood Pressure 144/62 120/62 118/66 130/72 -   Nustep Peak Heart Rate  100's-110's 100's-120's     Nustep Peak Blood Pressure - - - 126/60 128/64   Heart Rate 87 74 60's 77 86   Post Exercise /54 72/56 90/58 92/52 108/68   ECG A-Fib with occ, multifocal PVCs A-fib with rare PVC's A-fib with increased multi-focal PVC's, couplets on treadmill today, more freq. PVC\"s over all today A-fib w/occasional PVC's  A-fib with occ PVC's-multifocal  and 1 run of Bigeminy-no s/s VSS   Total Exercise Minutes 42 38 38.5 45 40   Daily Weights in CR:  254lb6.4oz  570uim9.4oz          256lbs 12.8oz    259lbs, 9.6oz 259lbs 12.8oz  Current Status:  Symptomatic SOB and Therapists Comments:  RN Note: 1/19/17 Pt up 3 lbs in weight from Tuesday and 5lbs in a week. pt denies any increased salt intake. pt feels bloated in abdomen. Pt has +2 LL edema - pt has been using his compression stockings. Crackles in bilateral lower lobes posterior. Clear in upper lobes. Pt does feel more SOB and fatigued with activities since the beginning of the week. Advised pt to call PMD as FYI of weight gain and symptoms. Pt verbalized understanding and will call today. pt does not feel he is urinating as much recently. Pt does have an enlarged prostate. RN Note: 1/24/17:Pt seeing PMD tomorrow regarding weight increase (pt called and spoke with nurse for appt on 1/19/2017 ). Pt's weight did continue to increase over the weekend but pt did take his metolazone (as needed med) due to the continue increase in weight. Pt has decreased his weight back down to what he was on Thursday. " Continued crackles in lower lobes. Pt still feels fluid on board. Pt has been wearing his compression stockings and even wearing them today.     If Physician recommends change in treatment plan, please place orders.        __________________________________________________      _____________  Signature                                                                                                  Date

## 2021-06-09 NOTE — PROGRESS NOTES
ITP ASSESSMENT   Assessment Day: 120 Day (Discharge)  Session Number: 32  Precautions: dHF, Hypertrophic CM, Chronic LBP, B knee pain  Diagnosis: MI;CHF;Stent (dCHF/HCM w/preserved EF @55%, POBA if ostial D1, WICHO to LAD)  Risk Stratification: High  Referring Provider: Lyle Navarrete MD   ITP: Dr. Navarrete   EXERCISE  Exercise Assessment: Discharge     6 Minute Walk Test   Pre   Pre Exercise HR: 63                  Pre Exercise BP: 88/48    Peak  Peak HR:                  Peak BP: 110/58  Peak feet: 1200  Peak O2 SAT: 96  Peak RPE: 11  Peak MPH: 2.27    Symptoms:  Peak Symptoms: 5/10 R hip pain and LBP, no change in B knee pain, denies cv sx/sx     5 mins. Post  5 Min Post HR: 72  5 Min Post BP: 98/52                         Exercise Plan  Goals Next 30 days  ADL'S: Pt states he is independent with ADL's  Leisure: Pt would like to resume snow blowing without fatigue with this activity. (4.5METs), continue to progress MET's in CR to LTG 4.5-5 METs to mock activity's such as yardwork in spring/summer Mowing 4.5-5METs.   Work: Retired    Education Goals: All goals in this section met  Education Goals Met: Medication review.;Has system for taking medication.;Patient can state cardiac s/s and appropriate emergency response.                        Goals Met  90 day ADL'S goals met: Pt is independent with all his ADL's  90 day Leisure goals met: Pt was able to increase his MET level to 4.6 on the stairs which is the MET goal we wanted to get to for summer activities.   90 day Work goals met: Retired  90 Day Progress: Pt has progressed well and reached MET level goal of 4.6 MET on the stairs. PT also reached 3.1 on the bikes for 30 minute duration.     60 day ADL'S goals met: Pt continues to be independent with ADL's   60 day Leisure goals met: Pt occ continues to feel challenged with increase METs on TM c/o Fatigue and occ SOB.   60 day Work goals met: Retired  60 Day Progression: Pt has progressed  from 3.3 to  2.7-3.9METs. Pt is limited with progression due to     30 day ADL'S goals met: Pt tolerating grocery shopping  30 day Leisure goals met: Pt walking regularly on non-rehab days  30 day Work goals met: Retired  30 Day Progression: Pt has progressed to 3.3 METS    Initial ADL's goals met: Has resumed putting on socks without SOB.  Initial Leisure goals met: Has resumed climbing stairs without SOB.  Intial Work goals met: Retired.  Initial Progression: Pt somewhat limited by R knee pain, but just rec'd cortisone shot.    Exercise Prescription  Maribeth 121-134    Current Exercise (mins/week): 145    Interventions  Home Exercise:  Mode: Walking, CALS, Weights  Frequency: 4-7x/week (pt is going to try for 7 days per week)  Duration: 30 minutes    Education Material : Educational videos;Provide written material;Individual education and counseling;Offer educational classes (Pt attended most education classes)    Education Completed  Exercise Education Completed: Cardiac Anatomy;Signs and Symptoms;Medication review;RPE;Home Exercise;Emergency Plan;Warm up/cool down;FITT Principles;BP/HR Reponse to exercise;Benefits of Exercise;Stretching;Strength training;End point of exercise (Pt has weight hand out to do at home. )            Exercise Follow-up/Discharge  Follow up/Discharge: Pt has continued to progress in duration on HEP walks. Pt is between 25 and 30 min and hopes to do this every day for HEP. Pt has progressed very well. 6'Walk duration was about the same as when he started but his RPE was significantly lower. Pt has progressed very well.  NUTRITION  Nutrition Assessment: Discharge    Nutrition Risk Factors:  Nutrition Risk Factors: Dyslipidemia;Overweight (No Diabetes per PMD)  Monitors blood sugar at home: No  Cholesterol: 138  LDL: 69  HDL: 33  Triglycerides: 179    Nutrition Plan  Interventions  Nutrition Interventions: Diet consult;Diet class;Therapist/Patient discussion;Provide with written material  Rate Your  "Plate Score: 62    Education Completed  Nutrition Education Completed: Low Saturated fat diet;Risk factor overview;Low sodium diet;Weight management;Meal planning class;Label reading class;Dining Out class      Goals Met  Nutrition Goals Met: Patient can identify their risk factors for CAD;Patient follows a low sodium diet;Completed Nutritional Risk Screen;Patient states following a low saturated fat diet;Reviewed Dietitian schedule;Provided Rate your Plate Survey;Patient knows appropriate portion size    Height, Weight, and  BMI  Weight: 252 lb 12.8 oz (114.7 kg)  Height: 5' 9\" (1.753 m)  BMI: 37.31    Nutrition Follow-up  Follow-up/Discharge: Pt met with dietician and feels his diet is going very well. Pt follows a low salt and heart healthy diet.  Pt has lost about 23lbs since starting cardiac rehab.        Other Risk Factors  Other Risk Factor Assessment: Discharge    HTN Risk Factor: Hypertension    Pre Exercise BP: 88/48 (asymptomatic )  Post Exercise BP: 92/58 (denies cv sx/sx )    Hypertension Plan    Goals Met  HTN Goals Met: Follow low sodium diet;Take medication as prescribed;Exercises regularly    HTN Interventions  HTN Interventions: Diet consult;Therapist/patient discussion;Provide written material;Offer educational videos;Offer educational classes    HTN Education Completed  HTN Education Completed: Low sodium diet;Medication review;Risk factor overview;Dining out class;Meal Planning class;Low sodium class    Tobacco Risk Factor: NA        Risk Factor Follow-up   Follow-up/Discharge: Family hx, pt understands CRF    PSYCHOSOCIAL  Psychosocial Assessment: Discharge     [facility] 14    MELISSA-D Score: 7    Psychosocial Risk Factor: Stress    Psychosocial Plan  Interventions  Interventions: Provide written material;Individual education and counseling    Education Completed  Education Completed: Relaxation/Coping Techniques;S/S of depression;Effects of stress on body      Goals " Met  Goals Met: Identified Support system;Identify stressors;Oriented to stress management classes;Improvement in [employer] COOP score;Practicing stress management skills ([place] 25-14 and MELISSA-D 25-7)    Psychosocial Follow-up  Follow-up/Discharge: Pt states he has a great support system of his wife. Pt feels things are going great regarding his health and how he is feeling. PT exercises for stress management techniques           Patient involved in Goal setting?: Yes       Signature: _____________________________________________________________    Date: __________________    Time: __________________

## 2021-06-16 NOTE — PROGRESS NOTES
ITP ASSESSMENT   Assessment Day: Initial  Session Number: 1  Precautions: Standard cardiac, dHF, Hypertrophic CM, Chronic LBP and B knee pain, A-fib  Diagnosis: Stent;CHF  Risk Stratification: High  Referring Provider: Thom Balderas MD   ITP: LEON Lopez/ Cosign: MD  EXERCISE  Exercise Assessment: Initial     6 Minute Walk Test   Pre   Pre Exercise HR: 78-82                  Pre Exercise BP: 106/76    Peak  Peak HR: 120-130                 Peak BP: 132/68  Peak feet: 1100  Peak O2 SAT: 97  Peak RPE: 13  Peak MPH: 2.08    Symptoms:  Peak Symptoms: Winded-resolved with rest, 4/10 B knee pain, 5/10 LBP and B hip pain    5 mins. Post  5 Min Post HR: 80-87  5 Min Post BP: 108/64                         Exercise Plan  Goals Next 30 days  ADL'S: Pt to resume getting dressed with less SOB- this activity can be hard on his B knees and back.  Leisure: Pt to resume grocery shopping with cart with less SOB (3.5 MET)  Work: Retired      Education Goals: All goals in this section met  Education Goals Met: Medication review.;Has system for taking medication.;Patient can state cardiac s/s and appropriate emergency response.    Exercise Prescription  Exercise Mode: Treadmill;Bike;Nustep;Stairs;Arm Erg.;Hallway Walking (Caution bikes due to B knee and LBP )  Frequency: 2x/week  Duration: 30-40 minutes  Intensity / THR: 20-30 beats above resting heart rate  RPE 11-14  Progression / Met level: 2.6-3.5+  Resistive Training?: Yes    Current Exercise (mins/week): 1    Interventions  Home Exercise:  Mode: Walking, CALS (Pt will be checking out a fitness center)  Frequency: 2-3x/week  Duration: 5-10 minutes 3x/day    Education Material : Educational videos;Provide written material;Individual education and counseling;Offer educational classes (Reviewed heart model with patient)    Education Completed  Exercise Education Completed: Cardiac Anatomy;Signs and Symptoms;Medication review;RPE;Emergency Plan;Home Exercise;Warm  "up/cool down;FITT Principles;BP/HR Reponse to exercise;Stretching;Strength training;Benefits of Exercise            Exercise Follow-up/Discharge  Follow up/Discharge: Pt is motivated to get back onto a formal aerobic exercise b/c he does admit to becoming deconditioned. Pt will be looking into a fitness facility that has more appropriate equipment b/c of pt's orthopedic limitations. Pt cannot walk for long distances.  NUTRITION  Nutrition Assessment: Initial    Nutrition Risk Factors:  Nutrition Risk Factors: Dyslipidemia;Overweight (No mention in recent records of DM)  Cholesterol: 135 (11/9/2017)  LDL: 72  HDL: 35  Triglycerides: 138    Nutrition Plan  Interventions  Diet Consult: Completed (Scheduled for 3/20/2018 at 0815)  Other Nutrition Intervention: Therapist/Pt Discussion;Provide with Written Material      Education Completed  Nutrition Education Completed: Low Saturated fat diet;Risk factor overview;Low sodium diet;Weight management    Goals  Nutrition Goals (Next 30 days): Patient can identify their risk factors for CAD;Patient will follow a low saturated fat diet;Patient knows appropriate portion size;Patient will lose weight    Goals Met  Nutrition Goals Met: Patient follows a low sodium diet;Completed Nutritional Risk Screen;Provided Rate your Plate Survey;Reviewed Dietitian schedule    Height, Weight, and  BMI  Weight: 258 lb 9.6 oz (117.3 kg)  Height: 5' 9\" (1.753 m)  BMI: 38.17    Nutrition Follow-up  Follow-up/Discharge: Pt scheduled to meet with dietician 3/20 at 0815. Pt very motivated to meet with dietician due to over the last year he has developed \"bad taste\" in mouth (MD's do not know what it is from). This has made it hard to eat more regularly due to nothing tastes good and he reports things that do are not necessarly the best choices. Pt is trying to stay more hydrated but struggles with water and drinks more juice (pt aware of the high sugar content). Pt does read labels. Pt and wife both " do the cooking at home. Pt is diligent in low sodium and weighs himself daily at home.        Other Risk Factors  Other Risk Factor Assessment: Initial    HTN Risk Factor: Hypertension    BP: 106/72    Hypertension Plan  Goals  HTN Goals: Exercises regularly    Goals Met  HTN Goals Met: Follow low sodium diet;Take medication as prescribed    HTN Interventions  HTN Interventions: Therapist/patient discussion;Provide written material;Offer educational videos;Offer educational classes;Diet consult    HTN Education Completed  HTN Education Completed: Low sodium diet;Medication review;Risk factor overview    Tobacco Risk Factor: NA        Risk Factor Follow-up   Follow-up/Discharge: Family hx, Reviewed CRF. Pt does monitor bp at home.    PSYCHOSOCIAL  Psychosocial Assessment: Initial     [facility] 28    MELISSA-D Score: 18    Psychosocial Risk Factor: Stress    Psychosocial Plan  Interventions  If MELISSA-D > 15 send letter to MD: Letter sent to PMD Dr. Navarrete on 3/9/2018  Interventions: Offer educational videos and classes;Provide written material;Individual education and counseling    Education Completed  Education Completed: Relaxation/Coping Techniques;S/S of depression;Effects of stress on body    Goals  Goals (Next 30 days): Improvement in [employer] COOP score    Goals Met  Goals Met: Identified Support system;Oriented to stress management classes;Identify stressors;Practicing stress management skills    Psychosocial Follow-up  Follow-up/Discharge: Pt does state that he was feeling more down after this recent procedure and did f/u with PMD and his Prozac was increased which pt has seen great benefit from. pt does feel like the past week things have been going well. Pt identifies his wife as a great support system. Pt enjoys playing on the computer and listening to music for relaxation. Pt is eager and excited to get back into cardiac rehab.            Patient involved in Goal setting?: Yes   LTG  is 4.5-5 METS  *Pt did have T wave inversion in inferior leads during 6'walk and biphasic/flat T waves at rest* Pt's only symptom was slightly winded. Denies any chest discomfort or any other discomfort other than orthopedic pain. VSS. *    Signature: _____________________________________________________________    Date: __________________    Time: __________________

## 2021-06-16 NOTE — PROGRESS NOTES
"Cardiac Rehab  Phase II Assessment    Assessment Date: 3/9/2018    Diagnosis: Abnormal Stress Test, CAD  Date of Onset: 12/27/2017  Procedure: PCI w/WICHO x 1 to mLCx  Date of Onset: 1/30/2018  ICD/Pacemaker: No   Post-op Complications: None  ECG History: A-fib with ST and T wave abnormality, PVC's and aberrant beats, prolonged QT EF%: 55% (10/6/2016) w/ \"normal EF\" on stress test 12/27/2017  Past Medical History: CAD s/p NSTEMI w/ WICHO x 1 to pLAD and POBA to D1 (ruptured plaque)- 10/5/2016 (areas of intervention are patent with only 20% stenosis), MARYELLEN-uses CPAP, Hypertension, Mild Apical Hypertrophic cardiomyopathy, HF w/preserved EF, Dyslipidemia, A-fib -chronic and on anticoagulation, 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  -Pt fell 12/17 and hurt back and L ribs    Physical Assessment  Precautions/ Physical Limitations: Chronic B Knee and Back Pain, Deconditioned, Remaining CAD 20-50%, dHF, HCM, A-fib  Oxygen: No  O2 Sats: 97% RA  Lung Sounds: Clear in all lobes posterior Edema: +1-2 B lower leg/ankle edema - pt reports this has improved.   Incisions: Right groin site healed well per patient  Sleeping Pattern: good -uses CPAP  Appetite: fair - due to \"bad taste in the mouth\" for the last year. Nothing tastes good. Dietician consult set up.   Nutrition Risk Screen: Completed    Pain  Location: B knee   Characteristics:Ache- Chronic   Intensity: (0-10 scale) 2  Current Pain Management: Vicodin and Tylenol  Intervention: Pain medication, pt also gets cortisone shots  Response: Helps  2/10 chronic LBP    Psychosocial/ Emotional Health  1. In the past 12 months, have you been in a relationship where you have been abused physically, emotionally, sexually or financially? No  notified: NA  2. Who do you turn to for emotional support?: Wife  3. Do you have cultural or spiritual needs? No  4. Have there been any " major life changes in the past 12 months? Yes , Stent Placement    Referral Information  Primary Physician: Lyle Navarrete MD  Cardiologist: LEON Lopez  Surgeon/Interventionalist: Dr. Balderas    Home exercise/Equipment: None. Pt will be looking into joining a fitness center    Patient's long-term goal(s): 1. Get into better aerobic shape and condition the heart 2. Lose Weight    1. Living Accommodations: Home Steps: Yes- 1 flight from the basement (steep steps)      Support people at home: Wife   2. Marital Status:   3. Family is able to assist with cares      Cheondoism/Community involvement: None  4. Recreation/Hobbies: 2 dogs and 2 cats, Listen to music, Games on the computer

## 2021-06-17 NOTE — PROGRESS NOTES
Pt is s/p stent 1/30/18 and is attending cardiac rehab.  Pt had session #5 of rehab today.  Pt's exercise HR was higher than normal during ex today (130s-150s).  Normally ex HR is 110s-120s.  Resting BP was 118/76, and ex BP was 118/54.  Pt c/o mild SOB, fatigue but denied CP during ex.  Pt did c/o increased knee pain to start with today (6/10), which got a little worse with ex.   He was instructed by his PMD clinic to take his Vicodin after exercise going forward rather than before exercise.  Pt reports he took all his meds this am as nml.    Will continue to monitor pt and update his med team with any changes.    Zachary Colunga  Cardiac Rehab Therapist

## 2021-06-17 NOTE — PROGRESS NOTES
ITP ASSESSMENT   Assessment Day: 30 Day  Session Number: 6  Precautions: Standard cardiac sx, Hypertrophic CM, joint pain  Diagnosis: Stent;CHF  Risk Stratification: High  Referring Provider: Lyle Navarrete MD  EXERCISE  Exercise Assessment: Reassessment                            Exercise Plan  Goals Next 30 days  ADL'S: Climb 1 flight of stairs without SOB.  Leisure: Walk 15-30 mins, 2-3x/week  Work: Retired    Education Goals: All goals in this section met  Education Goals Met: Medication review.;Has system for taking medication.;Patient can state cardiac s/s and appropriate emergency response.                        Goals Met  Initial ADL's goals met: Pt has resumed dressing himself without SOB, but is limited by joint pain.  Initial Leisure goals met: Pt has gotten groceries delivered recently, so he has not gone grocery shopping.  Intial Work goals met: Retired  Initial Progression: Pt's home activities have been limited by moderate to severe joint and body pain.    Exercise Prescription  Exercise Mode: Nustep;Arm Erg.;Hallway Walking  Frequency: 2x/week  Duration: 30-45 mins  Intensity / THR: 20-30 beats above resting heart rate  RPE 11-14  Progression / Met level: 3-3.3  Resistive Training?: Yes    Current Exercise (mins/week): 100    Interventions  Home Exercise:  Mode: Walking, cals  Frequency: 2-3x/week  Duration: 15-30 mins    Education Material : Educational videos;Provide written material;Individual education and counseling;Offer educational classes    Education Completed  Exercise Education Completed: Cardiac Anatomy;Signs and Symptoms;Medication review;RPE;Emergency Plan;Home Exercise;Warm up/cool down;FITT Principles;BP/HR Reponse to exercise;Stretching;Strength training;Benefits of Exercise            Exercise Follow-up/Discharge  Follow up/Discharge: Pt has reached peak MET level of 2.8 on Nustep for 20-25 mins so far in rehab.  He has been limited by joint and body pain.          "NUTRITION  Nutrition Assessment: Reassessment    Nutrition Risk Factors:  Nutrition Risk Factors: Dyslipidemia;Overweight  Cholesterol: 135 (11/9/2017)  LDL: 72  HDL: 35  Triglycerides: 138    Nutrition Plan  Interventions  Diet Consult: Completed  Other Nutrition Intervention: Diet Class;Therapist/Pt Discussion;Educational Videos;Provide with Written Material    Education Completed  Nutrition Education Completed: Low Saturated fat diet;Risk factor overview;Low sodium diet;Weight management    Goals  Nutrition Goals (Next 30 days): Patient will follow a low saturated fat diet;Patient will lose weight    Goals Met  Nutrition Goals Met: Patient follows a low sodium diet;Completed Nutritional Risk Screen;Provided Rate your Plate Survey;Reviewed Dietitian schedule;Patient knows appropriate portion size;Patient can identify their risk factors for CAD    Height, Weight, and  BMI  Weight: 253 lb 3.2 oz (114.9 kg)  Height: 5' 9\" (1.753 m)  BMI: 37.37    Nutrition Follow-up  Follow-up/Discharge: Pt missed dietician two weeks ago but will R/S.  Pt attmpting to lose weight.     Other Risk Factors  Other Risk Factor Assessment: Reassessment    HTN Risk Factor: Hypertension    Pre Exercise BP: 114/68  Post Exercise BP: 132/80    Hypertension Plan  Goals  HTN Goals: Patient demonstrates understanding of HTN, no goals identified for the next 30 days    Goals Met  HTN Goals Met: Follow low sodium diet;Take medication as prescribed;Exercises regularly    HTN Interventions  HTN Interventions: Therapist/patient discussion;Provide written material;Offer educational videos;Offer educational classes;Diet consult    HTN Education Completed  HTN Education Completed: Low sodium diet;Medication review;Risk factor overview    Tobacco Risk Factor: NA      Risk Factor Follow-up   Follow-up/Discharge: Pt following low sodium diet.       PSYCHOSOCIAL  Psychosocial Assessment: Reassessment       Psychosocial Risk Factor: Stress    Psychosocial " Plan  Interventions  Interventions: Offer educational videos and classes;Provide written material;Individual education and counseling    Education Completed  Education Completed: Relaxation/Coping Techniques;S/S of depression;Effects of stress on body    Goals  Goals (Next 30 days): Improvement in [employer] CO[employer] score    Goals Met  Goals Met: Identified Support system;Oriented to stress management classes;Identify stressors;Practicing stress management skills    Psychosocial Follow-up  Follow-up/Discharge: Pt denies regular stress but does express frustration at recent increase in joint pain.  Pt attempting home ex to help with stress.  He states he has a good support system.         Patient involved in Goal setting?: Yes    Signature: _____________________________________________________________    Date: __________________    Time: __________________

## 2021-06-19 NOTE — LETTER
Letter by Cinthya Alonso NP at      Author: Cinthya Alonso NP Service: -- Author Type: --    Filed:  Encounter Date: 2019 Status: Signed         Patient: Noe Mason   MR Number: [id number]   YOB: 1950   Date of Visit: 2019                 [facility]    DATE: 2019    NAME:  Noe Mason             :  1950  MRN: [id number]  CODE STATUS:  FULL CODE    VISIT TYPE: Problem Visit (hospital f/u)     FACILITY:  [facility] [[id number]]       CHIEF COMPLAIN/REASON FOR VISIT:    Chief Complaint   Patient presents with   ? Problem Visit     hospital f/u               HISTORY OF PRESENT ILLNESS: Noe Mason is a 69 y.o. male who was admitted  for T8-T11 spinal fusion due to unstable T9-10 fracture after multiple falls. He then presented to ED at [facility] on 10/28 for wound infection. He underwent I&D on 10/29 and cultures grew MSSA. ID consulted and changed vanco to cefazolin. This was recommended for 6 weeks. He had post op anemia but did not require any blood transfusions. He had venous dopplers of ble that were negative for dvt. F/u with ID in 2 weeks. He was recommended to transfer to TCU for further rehab. He has PMH of frequent falls, CAD s/p stenting, Diastolic CHF, A fib on chronic warfarin, renal infarction s/p emboli, seropositive RA, Gout, MARYELLEN, BPH, urinary retention, depression and anxiety. Prior to this he lived with wife in two story house. He is retired from [employer].     Today Mr. Mason states he sleeps well if he takes his sleeping pills. He had a shower with OT and that went well this morning. He was able to get dressed as well with minimal help. He says he wants his sleeping pills scheduled for 10pm at night. His pain is controlled and has not had anything this morning yet. His bowels are moving well and appetite is fine. He denies nausea, vomiting, stomach upset recently. His legs are swollen but going  down. His weight at home is usually 204lbs but at the hospital and then here he was much higher and last weight was 222lbs. He says he has no shortness of breath or cough and no cold symptoms recently. He denies dizziness or lightheadedness. His legs or knees give out on him and this is why he falls. He had 9 falls in the last 6 months. He was using a cane then graduated to walker recently but lives in a 2 story house. He says he is moving to walker assisted living after leaving [facility] here. His wife will move in his apartment with him after she sells their home. Reviewed medications and no current concerns other than wanting the sleeping pill scheduled. He denies other concerns today. He has a picc line in his right arm and says this working fine.     REVIEW OF SYSTEMS:  PROBLEMS AND REVIEW OF SYSTEMS:   Today on ROS:   Currently, no fever, chills, or rigors. Decreased vision-wears glasses. Denies any chest pain, headaches, palpitations, lightheadedness, dizziness, shortness of breath, or cough. Appetite is good. Denies any GERD symptoms. Denies any difficulty with swallowing, nausea, or vomiting.  Denies any abdominal pain, diarrhea or constipation. Denies any urinary symptoms. No insomnia. No active bleeding. No rash. Positive for weakness, ambulates with walker, back incision, pain controlled, leg swelling, weight gain, recent falls      Allergies   Allergen Reactions   ? Atorvastatin      Muscle pain   ? Lisinopril Cough     Current Outpatient Medications   Medication Sig   ? acetaminophen (TYLENOL) 325 MG tablet Take 650 mg by mouth every 6 (six) hours as needed for pain.   ? allopurinol (ZYLOPRIM) 300 MG tablet Take 300 mg by mouth daily.   ? [START ON 11/7/2019] aspirin 81 mg chewable tablet Chew 81 mg daily.          ? bumetanide (BUMEX) 2 MG tablet Take 2 mg by mouth 2 (two) times a day at 9am and 6pm.   ? cefazolin sodium/dextrose,iso (CEFAZOLIN IN DEXTROSE, ISO-OS,) 2 gram/50 mL PgBk Infuse 2 g into a  venous catheter every 8 (eight) hours.   ? chlorhexidine gluconate 2 % Liqd Apply topically once a week. Apply to PICC  Site topically one time a day every 7 day(s) for PICC  Site Care - change dressing every 7 days Clean site  with Chlorhexidrine Gluconate 2%; use securement  device; apply impregnated anti-microbial disc to insert  site; cover with a transparent semipermeable  membrane   ? cholecalciferol, vitamin D3, 1,000 unit (25 mcg) tablet Take 2,000 Units by mouth daily.   ? digoxin (LANOXIN) 125 mcg tablet Take 125 mcg by mouth daily.   ? DULoxetine (CYMBALTA) 60 MG capsule Take 60 mg by mouth daily.   ? [START ON 11/13/2019] enoxaparin (LOVENOX) 100 mg/mL Syrg Inject 100 mg under the skin every 12 (twelve) hours.   ? [START ON 11/7/2019] ergocalciferol (ERGOCALCIFEROL) 50,000 unit capsule Take 50,000 Units by mouth once a week.   ? gabapentin (NEURONTIN) 100 MG capsule Take 500 mg by mouth 3 (three) times a day.   ? HYDROcodone-acetaminophen 5-325 mg per tablet Take 1-2 tablets by mouth every 4 (four) hours as needed for pain.          ? hydrOXYzine pamoate (VISTARIL) 25 MG capsule Take 25-50 mg by mouth every 6 (six) hours as needed for itching.   ? lansoprazole (PREVACID) 30 MG capsule Take 30 mg by mouth daily.    ? [START ON 12/13/2019] leflunomide (ARAVA) 20 MG tablet Take 20 mg by mouth daily.   ? metoprolol tartrate (LOPRESSOR) 50 MG tablet Take 100 mg by mouth 2 (two) times a day. 150mg in am and 100mg in evening         ? nitroglycerin (NITROSTAT) 0.4 MG SL tablet Place 0.4 mg under the tongue every 5 (five) minutes as needed for chest pain.   ? polyethylene glycol (MIRALAX) 17 gram packet Take 17 g by mouth daily.   ? predniSONE (DELTASONE) 1 MG tablet Take by mouth Take 4 Tablets by mouth daily for 14 days, THEN 3 Tablets daily for 14 days, THEN 2 Tablets daily for 14 days, THEN 1 Tablet daily for 14 days .   ? senna (SENOKOT) 8.6 mg tablet Take 2 tablets by mouth daily.   ? spironolactone  (ALDACTONE) 25 MG tablet Take 12.5 mg by mouth daily.   ? traZODone (DESYREL) 50 MG tablet Take 50 mg by mouth at bedtime.   ? [START ON 11/13/2019] warfarin (COUMADIN) 2.5 MG tablet Take 5 mg by mouth daily. inr on 11/15         ? zolpidem (AMBIEN) 10 mg tablet Take 10 mg by mouth at bedtime.            Past Medical History:    Past Medical History:   Diagnosis Date   ? (HFpEF) heart failure with preserved ejection fraction (H)    ? A-fib (H)    ? CAD (coronary artery disease)    ? Depression with anxiety    ? Diverticulosis    ? Gout    ? MARYELLEN (obstructive sleep apnea)    ? RA (rheumatoid arthritis) (H)    ? Renal infarction (H)            PHYSICAL EXAMINATION  Vitals:    11/03/19 1602   BP: 131/59   Pulse: (!) 111   Resp: 18   Temp: 97.2  F (36.2  C)   SpO2: 99%   Weight: 222 lb (100.7 kg)       Today on physical exam:     GENERAL: Awake, Alert, oriented x3, not in any form of acute distress, answers questions appropriately, follows simple commands, conversant, obese  HEENT: Head is normocephalic with normal hair distribution. No evidence of trauma. Ears: No acute purulent discharge. Eyes: Conjunctivae pink with no scleral jaundice. Nose: Normal mucosa and septum. NECK: Supple with no cervical or supraclavicular lymphadenopathy. Trachea is midline. glasses  CHEST: No tenderness or deformity, no crepitus  LUNG: dim to auscultation with good chest expansion. There are no crackles or wheezes, normal AP diameter. No shortness of breath or cough  BACK: midline thoracic incision staples and sutures intact, mild erythema, no drainage, no warmth  CVS: irregularly irregular rhythm, there are no murmurs, rubs, gallops, or heaves,  2+ pulses symmetric in all extremities.   ABDOMEN: Rounded and soft, nontender to palpation, non distended, no masses, no organomegaly, good bowel sounds, no rebound or guarding, no peritoneal signs.   EXTREMITIES: 2+ ble edema, tubigrips, RUE PICC line c/d/i  SKIN: Warm and dry, no erythema  noted.  Skin color, texture, no rashes or lesions.  NEUROLOGICAL: The patient is oriented to person, place and time. Strength and sensation are grossly intact. Face is symmetric.            LABS:   Recent Results (from the past 168 hour(s))   Creatinine   Result Value Ref Range    Creatinine 0.84 0.70 - 1.30 mg/dL    GFR MDRD Af Amer >60 >60 mL/min/1.73m2    GFR MDRD Non Af Amer >60 >60 mL/min/1.73m2   C-Reactive Protein (CRP)   Result Value Ref Range    CRP 6.6 (H) 0.0 - 0.8 mg/dL   Blood Urea Nitrogen (BUN)   Result Value Ref Range    BUN 12 8 - 22 mg/dL   AST (SGOT)   Result Value Ref Range    AST 9 0 - 40 U/L   ALT (SGPT)   Result Value Ref Range    ALT <9 0 - 45 U/L   Alkaline Phosphatase   Result Value Ref Range    Alkaline Phosphatase 131 (H) 45 - 120 U/L   Bilirubin, Total   Result Value Ref Range    Bilirubin, Total 0.4 0.0 - 1.0 mg/dL   Bilirubin, Direct   Result Value Ref Range    Bilirubin, Direct 0.2 <=0.5 mg/dL   HM1 (CBC with Diff)   Result Value Ref Range    WBC 6.7 4.0 - 11.0 thou/uL    RBC 3.57 (L) 4.40 - 6.20 mill/uL    Hemoglobin 9.6 (L) 14.0 - 18.0 g/dL    Hematocrit 32.9 (L) 40.0 - 54.0 %    MCV 92 80 - 100 fL    MCH 26.9 (L) 27.0 - 34.0 pg    MCHC 29.2 (L) 32.0 - 36.0 g/dL    RDW 16.1 (H) 11.0 - 14.5 %    Platelets 284 140 - 440 thou/uL    MPV 10.7 8.5 - 12.5 fL    Neutrophils % 67 50 - 70 %    Lymphocytes % 19 (L) 20 - 40 %    Monocytes % 10 2 - 10 %    Eosinophils % 3 0 - 6 %    Basophils % 1 0 - 2 %    Neutrophils Absolute 4.5 2.0 - 7.7 thou/uL    Lymphocytes Absolute 1.2 0.8 - 4.4 thou/uL    Monocytes Absolute 0.7 0.0 - 0.9 thou/uL    Eosinophils Absolute 0.2 0.0 - 0.4 thou/uL    Basophils Absolute 0.0 0.0 - 0.2 thou/uL     No results found for this or any previous visit.      Lab Results   Component Value Date    WBC 6.7 11/04/2019    HGB 9.6 (L) 11/04/2019    HCT 32.9 (L) 11/04/2019    MCV 92 11/04/2019     11/04/2019       No results found for: [id number]  No results found for:  HGBA1C  No results found for: INR, PROTIME  No results found for: [id number]  No results found for: TSH        ASSESSMENT/PLAN:    1. S/p T8-T11 spinal fusion, MSSA wound infection, s/p I&D:  Incision c/d/i staples and sutures intact. Cefazolin q8h x 6 weeks. RUE PICC line c/d/i. F/u with ID in 2 weeks on 11/19. F/u with surgeon on 11/15. Pain controlled on tylenol three times a day, gabapentin, norco prn, hydroxyzine prn. Max of tylenol 4gm from all sources. Ordered IS q1h while awake.   2. Frequent falls: PT, OT. Reports knees and legs buckling. Moving to [place]. Ambulates with walker.   3. CAD, s/p drug alluding stent: No chest pain recently. On aspirin, nitro prn. Metoprolol.   4. Diastolic CHF: daily weights 222lbs. Baseline weight around 204lbs. 2+ edema, no shortness of breath today. On bumex 2mg two times a day, spironolactone. Heart healthy diet ordered. Changed tubigrips to elier hose.   5. Atrial fibrillation: rate uncontrolled at times 80-100s but does have readings in 110s. On digoxin, metoprolol. Added hold parameters. On lovenox x 14 days, may restart coumadin 5mg daily on 11/13. INR on 11/15. Dc heparin as no need for heparin and lovenox at this time. Increased metoprolol to 150mg in am and 100mg in evening.   6. Seropositive RA: On abatacept injections q4h weeks, will hold while in tcu. On leflunomide hold for 2 weeks until 11/13. Prednisone taper.   7. BPH: No concerns at this time.   8. Depression and anxiety: On duloxetine.   9. Gout: On allopurinol.   10. Vitamin d deficiency: on vitamin d 50,000 u weekly, 1000u daily. No need for both. Will dc daily and check level.   11. GERD: On lansoprazole.   12. Hypomagnesemia: on mag ox.   13. Hypokalemia: on kcl.   14. Constipation: on miralax daily, senna 2 tabs daily. No recent concerns. Will dc stop dates on stool softeners.   15. Insomnia: On trazodone at bedtime, ambien at bedtime prn. Will change ambien to 2200 scheduled.   16. Research study  medication: On 10mg daily, patient has own supply and unknown if on medication or placebo. He is on study through cardiology office for having history of 2 MI and diabetes to see if this reduces risk of recurrent Mis.   17. Type 2 DM: reports diet controlled. No hga1c found in epic system. Reports well managed and checks with pcp appointments.   18. MARYELLEN: previously refused CPAP.   19. Obesity: 222lbs baseline 204lbs but fluid overloaded. Counseling regarding healthier lifestyle, weight loss, dietary habits, increasing physical activity.   20. CKD stage 3: Cr0.82 on 11/1. Stable.     Weekly labs  Will add mg, digoxin, vit d level to next labs    Per therapy eval today    Electronically signed by: Cinthya Alonso NP    Total floor/unit time spent 35 with 25 time spent on counseling and coordination of care. Counseling was done regarding hospital course, med changes, lab results, a fib management, anticoagulant management, pain management, chf management, insomnia management, edema management. Coordinated care with nursing for management of diabetes, research medication, chf, pain management, insomnia management.

## 2021-06-19 NOTE — LETTER
Letter by Cinthya Alonso NP at      Author: Cinthya Alonso NP Service: -- Author Type: --    Filed:  Encounter Date: 2019 Status: Signed         Patient: Noe Mason   MR Number: [id number]   YOB: 1950   Date of Visit: 2019                 [facility]    DATE: 2019    NAME:  Noe Mason             :  1950  MRN: [id number]  CODE STATUS:  FULL CODE    VISIT TYPE: Review Of Multiple Medical Conditions     FACILITY:  [facility] [[id number]]       CHIEF COMPLAIN/REASON FOR VISIT:    Chief Complaint   Patient presents with   ? Review Of Multiple Medical Conditions               HISTORY OF PRESENT ILLNESS: Noe Mason is a 69 y.o. male who was admitted  for T8-T11 spinal fusion due to unstable T9-10 fracture after multiple falls. He then presented to ED at [facility] on 10/28 for wound infection. He underwent I&D on 10/29 and cultures grew MSSA. ID consulted and changed vanco to cefazolin. This was recommended for 6 weeks. He had post op anemia but did not require any blood transfusions. He had venous dopplers of ble that were negative for dvt. F/u with ID in 2 weeks. He was recommended to transfer to TCU for further rehab. He has PMH of frequent falls, CAD s/p stenting, Diastolic CHF, A fib on chronic warfarin, renal infarction s/p emboli, seropositive RA, Gout, MARYELLEN, BPH, urinary retention, depression and anxiety. Prior to this he lived with wife in two story house. He is retired from [employer].     Today Mr. Mason is seen for follow up visit today. He says he has not had any fevers that he knows of but is still having the night sweats. He continues to soak his bed most nights but will wake up and ask for his temp to get checked and does not have a fever. He says he is not having any further pain, redness, warmth around his picc line. It seems to be working fine. He says there was a lotion ordered for his feet but has  not come yet. His appetite is pretty good and no issues with nausea or stomach upset. He is not having any other concerns. His weights have been stable and no other concerns. He does ask how much longer he needs to be on the lovenox because he does not like these injections. Per review of chart happened to find that he had an INR drawn yesterday 11/13 but was ordered for 11/15. Nursing called on call provider who ordered to give coumadin 5mg x 1 and then contact provider for new orders. Cancelled previous coumadin and inr orders. INR was ordered for 11/15 so unclear why done on 11/13 as had not had any coumadin yet, restarted coumadin that day.     REVIEW OF SYSTEMS:  PROBLEMS AND REVIEW OF SYSTEMS:   Today on ROS:   Currently, no fever, chills, or rigors. Decreased vision-wears glasses. Denies any chest pain, headaches, palpitations, lightheadedness, dizziness, shortness of breath, or cough. Appetite is good. Denies any GERD symptoms. Denies any difficulty with swallowing, nausea, or vomiting.  Denies any abdominal pain, diarrhea or constipation. Denies any urinary symptoms. No insomnia. No active bleeding. No rash. Positive for ambulates with walker, back incision, pain controlled, leg swelling improved, night sweats, but no fevers or chills, iv antibiotics, picc line, dry skin      Allergies   Allergen Reactions   ? Atorvastatin      Muscle pain   ? Lisinopril Cough     Current Outpatient Medications   Medication Sig   ? acetaminophen (TYLENOL) 325 MG tablet Take 650 mg by mouth every 6 (six) hours as needed for pain.   ? allopurinol (ZYLOPRIM) 300 MG tablet Take 300 mg by mouth daily.   ? aspirin 81 mg chewable tablet Chew 81 mg daily.          ? bumetanide (BUMEX) 2 MG tablet Take 2 mg by mouth 2 (two) times a day at 9am and 6pm.   ? cefazolin sodium/dextrose,iso (CEFAZOLIN IN DEXTROSE, ISO-OS,) 2 gram/50 mL PgBk Infuse 2 g into a venous catheter every 8 (eight) hours.   ? chlorhexidine gluconate 2 % Liqd Apply  topically once a week. Apply to PICC  Site topically one time a day every 7 day(s) for PICC  Site Care - change dressing every 7 days Clean site  with Chlorhexidrine Gluconate 2%; use securement  device; apply impregnated anti-microbial disc to insert  site; cover with a transparent semipermeable  membrane   ? digoxin (LANOXIN) 125 mcg tablet Take 125 mcg by mouth daily.   ? DULoxetine (CYMBALTA) 60 MG capsule Take 60 mg by mouth daily.   ? enoxaparin (LOVENOX) 100 mg/mL Syrg Inject 100 mg under the skin every 12 (twelve) hours.   ? ergocalciferol (ERGOCALCIFEROL) 50,000 unit capsule Take 50,000 Units by mouth once a week.   ? gabapentin (NEURONTIN) 100 MG capsule Take 500 mg by mouth 3 (three) times a day.   ? HYDROcodone-acetaminophen 5-325 mg per tablet Take 1-2 tablets by mouth every 4 (four) hours as needed for pain.          ? lansoprazole (PREVACID) 30 MG capsule Take 30 mg by mouth daily.    ? [START ON 12/13/2019] leflunomide (ARAVA) 20 MG tablet Take 20 mg by mouth daily.   ? metoprolol tartrate (LOPRESSOR) 50 MG tablet Take 100 mg by mouth 2 (two) times a day. 150mg in am and 100mg in evening         ? predniSONE (DELTASONE) 1 MG tablet Take by mouth Take 4 Tablets by mouth daily for 14 days, THEN 3 Tablets daily for 14 days, THEN 2 Tablets daily for 14 days, THEN 1 Tablet daily for 14 days .   ? spironolactone (ALDACTONE) 25 MG tablet Take 12.5 mg by mouth daily.   ? traZODone (DESYREL) 50 MG tablet Take 50 mg by mouth at bedtime.   ? trolamine salicylate (ASPERCREME) 10 % cream Apply 1 application topically 2 (two) times a day. And q6h prn   ? warfarin sodium (WARFARIN ORAL) Take by mouth. 11/15/19 INR 1.32  Cont 5mg daily and Lovenox 100mg two times a day.  Next INR 11/18/19.   ? white petrolatum (AQUAPHOR ORIGINAL) 41 % Oint Apply 1 application topically at bedtime.   ? zolpidem (AMBIEN) 10 mg tablet Take 10 mg by mouth at bedtime.            Past Medical History:    Past Medical History:   Diagnosis  Date   ? (HFpEF) heart failure with preserved ejection fraction (H)    ? A-fib (H)    ? CAD (coronary artery disease)    ? Depression with anxiety    ? Diverticulosis    ? Gout    ? MARYELLEN (obstructive sleep apnea)    ? RA (rheumatoid arthritis) (H)    ? Renal infarction (H)            PHYSICAL EXAMINATION  Vitals:    11/13/19 2206   BP: 108/63   Pulse: 87   Resp: 17   Temp: 98.2  F (36.8  C)   SpO2: 95%   Weight: 214 lb (97.1 kg)       Today on physical exam:     GENERAL: Awake, Alert, oriented x3, not in any form of acute distress, answers questions appropriately, follows simple commands, conversant, obese  HEENT: Head is normocephalic with normal hair distribution. No evidence of trauma. Ears: No acute purulent discharge. Eyes: Conjunctivae pink with no scleral jaundice. Nose: Normal mucosa and septum. NECK: Supple with no cervical or supraclavicular lymphadenopathy. Trachea is midline. glasses  CHEST: No tenderness or deformity, no crepitus  LUNG: dim to auscultation with good chest expansion. There are no crackles or wheezes, normal AP diameter. No shortness of breath or cough  BACK: midline thoracic incision staples and sutures intact, mild erythema, no drainage, no warmth  CVS: irregularly irregular rhythm, there are no murmurs, rubs, gallops, or heaves,  2+ pulses symmetric in all extremities.   ABDOMEN: Rounded and soft, nontender to palpation, non distended, no masses, no organomegaly, good bowel sounds, no rebound or guarding, no peritoneal signs.   EXTREMITIES: 1+ ble edema, tubigrips, RUE PICC line c/d/i, no erythema, edema, warmth  SKIN: Warm and dry,dry cracked skin on bilateral feet  NEUROLOGICAL: The patient is oriented to person, place and time. Strength and sensation are grossly intact. Face is symmetric.            LABS:   Recent Results (from the past 168 hour(s))   HM1 (CBC with Diff)   Result Value Ref Range    WBC 6.5 4.0 - 11.0 thou/uL    RBC 4.09 (L) 4.40 - 6.20 mill/uL    Hemoglobin 11.1 (L)  14.0 - 18.0 g/dL    Hematocrit 37.5 (L) 40.0 - 54.0 %    MCV 92 80 - 100 fL    MCH 27.1 27.0 - 34.0 pg    MCHC 29.6 (L) 32.0 - 36.0 g/dL    RDW 16.0 (H) 11.0 - 14.5 %    Platelets 286 140 - 440 thou/uL    MPV 11.1 8.5 - 12.5 fL    Neutrophils % 62 50 - 70 %    Lymphocytes % 21 20 - 40 %    Monocytes % 10 2 - 10 %    Eosinophils % 5 0 - 6 %    Basophils % 1 0 - 2 %    Neutrophils Absolute 4.1 2.0 - 7.7 thou/uL    Lymphocytes Absolute 1.4 0.8 - 4.4 thou/uL    Monocytes Absolute 0.7 0.0 - 0.9 thou/uL    Eosinophils Absolute 0.3 0.0 - 0.4 thou/uL    Basophils Absolute 0.1 0.0 - 0.2 thou/uL   C-Reactive Protein   Result Value Ref Range    CRP 4.2 (H) 0.0 - 0.8 mg/dL   Alkaline Phosphatase, Total   Result Value Ref Range    Alkaline Phosphatase 131 (H) 45 - 120 U/L   Glycosylated Hemoglobin A1c   Result Value Ref Range    Hemoglobin A1c 6.0 4.2 - 6.1 %   Digoxin (Lanoxin )   Result Value Ref Range    Digoxin 0.5 0.5 - 2.0 ng/mL   Potassium   Result Value Ref Range    Potassium 3.9 3.5 - 5.0 mmol/L   Vitamin D, Total (25-Hydroxy)   Result Value Ref Range    Vitamin D, Total (25-Hydroxy) 41.0 30.0 - 80.0 ng/mL   Magnesium   Result Value Ref Range    Magnesium 1.8 1.8 - 2.6 mg/dL   ALT (SGPT)   Result Value Ref Range    ALT <9 0 - 45 U/L   AST (SGOT)   Result Value Ref Range    AST 15 0 - 40 U/L   Bilirubin, Total   Result Value Ref Range    Bilirubin, Total 0.4 0.0 - 1.0 mg/dL   Bilirubin, Direct   Result Value Ref Range    Bilirubin, Direct 0.2 <=0.5 mg/dL   Blood Urea Nitrogen (BUN)   Result Value Ref Range    BUN 17 8 - 22 mg/dL   Creatinine   Result Value Ref Range    Creatinine 1.00 0.70 - 1.30 mg/dL    GFR MDRD Af Amer >60 >60 mL/min/1.73m2    GFR MDRD Non Af Amer >60 >60 mL/min/1.73m2   INR   Result Value Ref Range    INR 1.09 0.90 - 1.10   INR   Result Value Ref Range    INR 1.32 (H) 0.90 - 1.10   Alkaline Phosphatase   Result Value Ref Range    Alkaline Phosphatase 145 (H) 45 - 120 U/L     Results for orders placed  or performed in visit on 11/05/19   Basic Metabolic Panel   Result Value Ref Range    Sodium 143 136 - 145 mmol/L    Potassium 3.9 3.5 - 5.0 mmol/L    Chloride 100 98 - 107 mmol/L    CO2 34 (H) 22 - 31 mmol/L    Anion Gap, Calculation 9 5 - 18 mmol/L    Glucose 90 70 - 125 mg/dL    Calcium 9.9 8.5 - 10.5 mg/dL    BUN 11 8 - 22 mg/dL    Creatinine 0.93 0.70 - 1.30 mg/dL    GFR MDRD Af Amer >60 >60 mL/min/1.73m2    GFR MDRD Non Af Amer >60 >60 mL/min/1.73m2         Lab Results   Component Value Date    WBC 6.5 11/11/2019    HGB 11.1 (L) 11/11/2019    HCT 37.5 (L) 11/11/2019    MCV 92 11/11/2019     11/11/2019       No results found for: [id number]  Lab Results   Component Value Date    HGBA1C 6.0 11/11/2019     Lab Results   Component Value Date    INR 1.32 (H) 11/15/2019    INR 1.09 11/13/2019    INR 1.08 11/05/2019     Vitamin D, Total (25-Hydroxy)   Date Value Ref Range Status   11/11/2019 41.0 30.0 - 80.0 ng/mL Final     No results found for: TSH        ASSESSMENT/PLAN:    1. S/p T8-T11 spinal fusion, MSSA wound infection, s/p I&D:  Incision c/d/i staples and sutures intact. some erythema around incision. Cefazolin q8h x 6 weeks. RUE PICC line c/d/i. F/u with ID in 2 weeks on 11/19. F/u with surgeon on 11/15. Pain controlled on tylenol three times a day, gabapentin, norco prn. Using norco 1-2 times per day. Max of tylenol 4gm from all sources.  IS q1h while awake.   No further fevers but continues to have night sweats. Blood cultures still pending. Labs stable.   2. Frequent falls: PT, OT. Reports knees and legs buckling. Moving to half-way. Ambulates with walker. Knee pain,  trolamine cream two times a day and prn. No concerns today.   3. CAD, s/p drug alluding stent: No chest pain recently. On aspirin, nitro prn. Metoprolol.   4. Diastolic CHF: daily weights [phone]. Baseline weight around 204lbs. 1+ edema, no shortness of breath today. On bumex 2mg two times a day, spironolactone. Heart healthy  diet ordered. elier levy. Stable recently.   5. Atrial fibrillation: rate controlled at times 80s. On digoxin, metoprolol.  hold parameters. On lovenox two times a day. INR was ordered for 11/15. Started coumadin 5mg daily 11/13. Unclear why INR done on 11/13 as was not ordered. Level subtherapeutic 1.03 as has not had any coumadin. INR on 11/15 as previously ordered. Continue coumadin 5mg daily until then. Continue lovenox two times a day. Previously increased metoprolol to 150mg in am and 100mg in evening. Digoxin level 0.5 on 11/11. Stable.   6. Seropositive RA: On abatacept injections q4h weeks, will hold while in tcu. On leflunomide hold for 2 weeks until 11/13. Prednisone taper.   7. BPH: No concerns at this time.   8. Depression and anxiety: On duloxetine.   9. Gout: On allopurinol.   10. Vitamin d deficiency: on vitamin d 50,000 u weekly, vit d level 41 on 11/11. Stable.   11. GERD: On lansoprazole.   12. Hypomagnesemia: on mag ox. Mg 1.8 on 11/11. Stable.   13. Hypokalemia: on kcl. k 3.9 on 11/11.   14. Constipation: on miralax daily, senna 2 tabs daily. No recent concerns. Stable.   15. Insomnia: On trazodone at bedtime, ambien at bedtime prn. No further concerns.    16. Research study medication: On 10mg daily, patient has own supply and unknown if on medication or placebo. He is on study through cardiology office for having history of 2 MI and diabetes to see if this reduces risk of recurrent Mis.   17. Type 2 DM: reports diet controlled. No hga1c found in epic system. Reports well managed and checks with pcp appointments. hga1c 6.0 on 11/11 stable. No need for meds at this time.   18. MARYELLEN: previously refused CPAP. No concerns.   19. Obesity: [phone] baseline 204lbs but fluid overloaded. Counseling regarding healthier lifestyle, weight loss, dietary habits, increasing physical activity.   20. CKD stage 3: Cr0.82 on 11/1. Cr 1.00 on 11/11. Stable.   21. Anemia of chronic disease: Hg 11.1 on  11/11. Stable.   22. Dry skin: aquaphor at bedtime scheduled. Needs nail care as well.     Weekly labs      Electronically signed by: Cinthya Alonso NP

## 2021-06-19 NOTE — LETTER
Letter by Cinthya Alonso NP at      Author: Cinthya Alonso NP Service: -- Author Type: --    Filed:  Encounter Date: 2019 Status: Signed         Patient: Noe Mason   MR Number: [id number]   YOB: 1950   Date of Visit: 2019                 [facility]    DATE: 2019    NAME:  Noe Mason             :  1950  MRN: [id number]  CODE STATUS:  FULL CODE    VISIT TYPE: Problem Visit     FACILITY:  [facility] [[id number]]       CHIEF COMPLAIN/REASON FOR VISIT:    Chief Complaint   Patient presents with   ? Problem Visit               HISTORY OF PRESENT ILLNESS: Noe Mason is a 69 y.o. male who was admitted  for T8-T11 spinal fusion due to unstable T9-10 fracture after multiple falls. He then presented to ED at [facility] on 10/28 for wound infection. He underwent I&D on 10/29 and cultures grew MSSA. ID consulted and changed vanco to cefazolin. This was recommended for 6 weeks. He had post op anemia but did not require any blood transfusions. He had venous dopplers of ble that were negative for dvt. F/u with ID in 2 weeks. He was recommended to transfer to TCU for further rehab. He has PMH of frequent falls, CAD s/p stenting, Diastolic CHF, A fib on chronic warfarin, renal infarction s/p emboli, seropositive RA, Gout, MARYELLEN, BPH, urinary retention, depression and anxiety. Prior to this he lived with wife in two story house. He is retired from [employer].     Today Mr. Mason is seen for concerns of low grade temps over weekend. His surgeon was notified and ordered to monitor incision and call if temp >101. He had temps of  over weekend. On exam he says he would wake up with night sweats and soak the bed at night and also have chills. He started this a few days ago. He is also worried because he was having pain in his picc line. He thought his arm was swollen but the staff did not think so. He is using the norco and feels  his pain is controlled on this. He has an appointment with spine surgeon on 11/15 and ID on 11/19. He would like his incision looked at today. Otherwise his bowels are moving ok and no issues breathing. His weights are down 222-214lbs. His baseline weight is around 204lbs so discussed this and he feels a lot of this is water weight. He is having a lot of knee pain with therapy. He is also taking norco for this and we discussed using ice and topical agents. He uses bengay at home and we discussed using aspercreme here. He says his feet are very dry and needs some nailcare. He is requesting something for this.     REVIEW OF SYSTEMS:  PROBLEMS AND REVIEW OF SYSTEMS:   Today on ROS:   Currently, no fever, chills, or rigors. Decreased vision-wears glasses. Denies any chest pain, headaches, palpitations, lightheadedness, dizziness, shortness of breath, or cough. Appetite is good. Denies any GERD symptoms. Denies any difficulty with swallowing, nausea, or vomiting.  Denies any abdominal pain, diarrhea or constipation. Denies any urinary symptoms. No insomnia. No active bleeding. No rash. Positive for ambulates with walker, back incision, pain controlled, leg swelling improved, weight loss, night sweats, fevers, chills      Allergies   Allergen Reactions   ? Atorvastatin      Muscle pain   ? Lisinopril Cough     Current Outpatient Medications   Medication Sig   ? trolamine salicylate (ASPERCREME) 10 % cream Apply 1 application topically 2 (two) times a day. And q6h prn   ? white petrolatum (AQUAPHOR ORIGINAL) 41 % Oint Apply 1 application topically at bedtime.   ? acetaminophen (TYLENOL) 325 MG tablet Take 650 mg by mouth every 6 (six) hours as needed for pain.   ? allopurinol (ZYLOPRIM) 300 MG tablet Take 300 mg by mouth daily.   ? aspirin 81 mg chewable tablet Chew 81 mg daily.          ? bumetanide (BUMEX) 2 MG tablet Take 2 mg by mouth 2 (two) times a day at 9am and 6pm.   ? cefazolin sodium/dextrose,iso (CEFAZOLIN IN  DEXTROSE, ISO-OS,) 2 gram/50 mL PgBk Infuse 2 g into a venous catheter every 8 (eight) hours.   ? chlorhexidine gluconate 2 % Liqd Apply topically once a week. Apply to PICC  Site topically one time a day every 7 day(s) for PICC  Site Care - change dressing every 7 days Clean site  with Chlorhexidrine Gluconate 2%; use securement  device; apply impregnated anti-microbial disc to insert  site; cover with a transparent semipermeable  membrane   ? cholecalciferol, vitamin D3, 1,000 unit (25 mcg) tablet Take 2,000 Units by mouth daily.   ? digoxin (LANOXIN) 125 mcg tablet Take 125 mcg by mouth daily.   ? DULoxetine (CYMBALTA) 60 MG capsule Take 60 mg by mouth daily.   ? [START ON 11/13/2019] enoxaparin (LOVENOX) 100 mg/mL Syrg Inject 100 mg under the skin every 12 (twelve) hours.   ? ergocalciferol (ERGOCALCIFEROL) 50,000 unit capsule Take 50,000 Units by mouth once a week.   ? gabapentin (NEURONTIN) 100 MG capsule Take 500 mg by mouth 3 (three) times a day.   ? HYDROcodone-acetaminophen 5-325 mg per tablet Take 1-2 tablets by mouth every 4 (four) hours as needed for pain.          ? lansoprazole (PREVACID) 30 MG capsule Take 30 mg by mouth daily.    ? [START ON 12/13/2019] leflunomide (ARAVA) 20 MG tablet Take 20 mg by mouth daily.   ? metoprolol tartrate (LOPRESSOR) 50 MG tablet Take 100 mg by mouth 2 (two) times a day. 150mg in am and 100mg in evening         ? nitroglycerin (NITROSTAT) 0.4 MG SL tablet Place 0.4 mg under the tongue every 5 (five) minutes as needed for chest pain.   ? polyethylene glycol (MIRALAX) 17 gram packet Take 17 g by mouth daily.   ? predniSONE (DELTASONE) 1 MG tablet Take by mouth Take 4 Tablets by mouth daily for 14 days, THEN 3 Tablets daily for 14 days, THEN 2 Tablets daily for 14 days, THEN 1 Tablet daily for 14 days .   ? senna (SENOKOT) 8.6 mg tablet Take 2 tablets by mouth daily.   ? spironolactone (ALDACTONE) 25 MG tablet Take 12.5 mg by mouth daily.   ? traZODone (DESYREL) 50 MG  tablet Take 50 mg by mouth at bedtime.   ? [START ON 11/13/2019] warfarin (COUMADIN) 2.5 MG tablet Take 5 mg by mouth daily. inr on 11/15         ? zolpidem (AMBIEN) 10 mg tablet Take 10 mg by mouth at bedtime.            Past Medical History:    Past Medical History:   Diagnosis Date   ? (HFpEF) heart failure with preserved ejection fraction (H)    ? A-fib (H)    ? CAD (coronary artery disease)    ? Depression with anxiety    ? Diverticulosis    ? Gout    ? MARYELLEN (obstructive sleep apnea)    ? RA (rheumatoid arthritis) (H)    ? Renal infarction (H)            PHYSICAL EXAMINATION  Vitals:    11/10/19 2051   BP: 137/77   Pulse: 96   Resp: 18   Temp: 97.7  F (36.5  C)   SpO2: 96%   Weight: 214 lb (97.1 kg)       Today on physical exam:     GENERAL: Awake, Alert, oriented x3, not in any form of acute distress, answers questions appropriately, follows simple commands, conversant, obese  HEENT: Head is normocephalic with normal hair distribution. No evidence of trauma. Ears: No acute purulent discharge. Eyes: Conjunctivae pink with no scleral jaundice. Nose: Normal mucosa and septum. NECK: Supple with no cervical or supraclavicular lymphadenopathy. Trachea is midline. glasses  CHEST: No tenderness or deformity, no crepitus  LUNG: dim to auscultation with good chest expansion. There are no crackles or wheezes, normal AP diameter. No shortness of breath or cough  BACK: midline thoracic incision staples and sutures intact, mild erythema, no drainage, no warmth  CVS: irregularly irregular rhythm, there are no murmurs, rubs, gallops, or heaves,  2+ pulses symmetric in all extremities.   ABDOMEN: Rounded and soft, nontender to palpation, non distended, no masses, no organomegaly, good bowel sounds, no rebound or guarding, no peritoneal signs.   EXTREMITIES: 1+ ble edema, tubigrips, RUE PICC line c/d/i  SKIN: Warm and dry,dry cracked skin on bilateral feet  NEUROLOGICAL: The patient is oriented to person, place and time.  Strength and sensation are grossly intact. Face is symmetric.            LABS:   Recent Results (from the past 168 hour(s))   Basic Metabolic Panel   Result Value Ref Range    Sodium 143 136 - 145 mmol/L    Potassium 3.9 3.5 - 5.0 mmol/L    Chloride 100 98 - 107 mmol/L    CO2 34 (H) 22 - 31 mmol/L    Anion Gap, Calculation 9 5 - 18 mmol/L    Glucose 90 70 - 125 mg/dL    Calcium 9.9 8.5 - 10.5 mg/dL    BUN 11 8 - 22 mg/dL    Creatinine 0.93 0.70 - 1.30 mg/dL    GFR MDRD Af Amer >60 >60 mL/min/1.73m2    GFR MDRD Non Af Amer >60 >60 mL/min/1.73m2   C-Reactive Protein (CRP)   Result Value Ref Range    CRP 5.5 (H) 0.0 - 0.8 mg/dL   Magnesium   Result Value Ref Range    Magnesium 1.9 1.8 - 2.6 mg/dL   Vitamin D, Total (25-Hydroxy)   Result Value Ref Range    Vitamin D, Total (25-Hydroxy) 31.6 30.0 - 80.0 ng/mL   Digoxin (Lanoxin )   Result Value Ref Range    Digoxin 0.5 0.5 - 2.0 ng/mL   INR   Result Value Ref Range    INR 1.08 0.90 - 1.10   HM1 (CBC with Diff)   Result Value Ref Range    WBC 6.8 4.0 - 11.0 thou/uL    RBC 3.83 (L) 4.40 - 6.20 mill/uL    Hemoglobin 10.3 (L) 14.0 - 18.0 g/dL    Hematocrit 35.6 (L) 40.0 - 54.0 %    MCV 93 80 - 100 fL    MCH 26.9 (L) 27.0 - 34.0 pg    MCHC 28.9 (L) 32.0 - 36.0 g/dL    RDW 16.1 (H) 11.0 - 14.5 %    Platelets 303 140 - 440 thou/uL    MPV 10.6 8.5 - 12.5 fL    Neutrophils % 63 50 - 70 %    Lymphocytes % 22 20 - 40 %    Monocytes % 11 (H) 2 - 10 %    Eosinophils % 3 0 - 6 %    Basophils % 1 0 - 2 %    Neutrophils Absolute 4.3 2.0 - 7.7 thou/uL    Lymphocytes Absolute 1.5 0.8 - 4.4 thou/uL    Monocytes Absolute 0.7 0.0 - 0.9 thou/uL    Eosinophils Absolute 0.2 0.0 - 0.4 thou/uL    Basophils Absolute 0.1 0.0 - 0.2 thou/uL   HM1 (CBC with Diff)   Result Value Ref Range    WBC 6.5 4.0 - 11.0 thou/uL    RBC 4.09 (L) 4.40 - 6.20 mill/uL    Hemoglobin 11.1 (L) 14.0 - 18.0 g/dL    Hematocrit 37.5 (L) 40.0 - 54.0 %    MCV 92 80 - 100 fL    MCH 27.1 27.0 - 34.0 pg    MCHC 29.6 (L) 32.0 -  36.0 g/dL    RDW 16.0 (H) 11.0 - 14.5 %    Platelets 286 140 - 440 thou/uL    MPV 11.1 8.5 - 12.5 fL    Neutrophils % 62 50 - 70 %    Lymphocytes % 21 20 - 40 %    Monocytes % 10 2 - 10 %    Eosinophils % 5 0 - 6 %    Basophils % 1 0 - 2 %    Neutrophils Absolute 4.1 2.0 - 7.7 thou/uL    Lymphocytes Absolute 1.4 0.8 - 4.4 thou/uL    Monocytes Absolute 0.7 0.0 - 0.9 thou/uL    Eosinophils Absolute 0.3 0.0 - 0.4 thou/uL    Basophils Absolute 0.1 0.0 - 0.2 thou/uL   C-Reactive Protein   Result Value Ref Range    CRP 4.2 (H) 0.0 - 0.8 mg/dL   Alkaline Phosphatase, Total   Result Value Ref Range    Alkaline Phosphatase 131 (H) 45 - 120 U/L   Glycosylated Hemoglobin A1c   Result Value Ref Range    Hemoglobin A1c 6.0 4.2 - 6.1 %   Digoxin (Lanoxin )   Result Value Ref Range    Digoxin 0.5 0.5 - 2.0 ng/mL   Potassium   Result Value Ref Range    Potassium 3.9 3.5 - 5.0 mmol/L   Vitamin D, Total (25-Hydroxy)   Result Value Ref Range    Vitamin D, Total (25-Hydroxy) 41.0 30.0 - 80.0 ng/mL   Magnesium   Result Value Ref Range    Magnesium 1.8 1.8 - 2.6 mg/dL   ALT (SGPT)   Result Value Ref Range    ALT <9 0 - 45 U/L   AST (SGOT)   Result Value Ref Range    AST 15 0 - 40 U/L   Bilirubin, Total   Result Value Ref Range    Bilirubin, Total 0.4 0.0 - 1.0 mg/dL   Bilirubin, Direct   Result Value Ref Range    Bilirubin, Direct 0.2 <=0.5 mg/dL   Blood Urea Nitrogen (BUN)   Result Value Ref Range    BUN 17 8 - 22 mg/dL   Creatinine   Result Value Ref Range    Creatinine 1.00 0.70 - 1.30 mg/dL    GFR MDRD Af Amer >60 >60 mL/min/1.73m2    GFR MDRD Non Af Amer >60 >60 mL/min/1.73m2     Results for orders placed or performed in visit on 11/05/19   Basic Metabolic Panel   Result Value Ref Range    Sodium 143 136 - 145 mmol/L    Potassium 3.9 3.5 - 5.0 mmol/L    Chloride 100 98 - 107 mmol/L    CO2 34 (H) 22 - 31 mmol/L    Anion Gap, Calculation 9 5 - 18 mmol/L    Glucose 90 70 - 125 mg/dL    Calcium 9.9 8.5 - 10.5 mg/dL    BUN 11 8 - 22  mg/dL    Creatinine 0.93 0.70 - 1.30 mg/dL    GFR MDRD Af Amer >60 >60 mL/min/1.73m2    GFR MDRD Non Af Amer >60 >60 mL/min/1.73m2         Lab Results   Component Value Date    WBC 6.5 11/11/2019    HGB 11.1 (L) 11/11/2019    HCT 37.5 (L) 11/11/2019    MCV 92 11/11/2019     11/11/2019       No results found for: [id number]  Lab Results   Component Value Date    HGBA1C 6.0 11/11/2019     Lab Results   Component Value Date    INR 1.08 11/05/2019     Vitamin D, Total (25-Hydroxy)   Date Value Ref Range Status   11/11/2019 41.0 30.0 - 80.0 ng/mL Final     No results found for: TSH        ASSESSMENT/PLAN:    1. S/p T8-T11 spinal fusion, MSSA wound infection, s/p I&D:  Incision c/d/i staples and sutures intact. Cefazolin q8h x 6 weeks. RUE PICC line c/d/i. F/u with ID in 2 weeks on 11/19. F/u with surgeon on 11/15. Pain controlled on tylenol three times a day, gabapentin, norco prn, hydroxyzine prn. Max of tylenol 4gm from all sources.  IS q1h while awake. norco used 1-3 times per day, 3 on 9th, 2 on 10th. Pain controlled. Fevers, chills, low grade temps  over weekend, night sweats. Will order blood cultures x 2 sets one from picc line, having picc line discomfort but no red streaks, no edema noted. Labs today. Incision is healing well with just mild inflammation. Dc hydroxyzine as not using.   2. Frequent falls: PT, OT. Reports knees and legs buckling. Moving to skilled nursing. Ambulates with walker. Knee pain, will order trolamine cream two times a day and prn.   3. CAD, s/p drug alluding stent: No chest pain recently. On aspirin, nitro prn. Metoprolol.   4. Diastolic CHF: daily weights 222-214lbs. Baseline weight around 204lbs. 1+ edema, no shortness of breath today. On bumex 2mg two times a day, spironolactone. Heart healthy diet ordered. elier levy. Will continue bumex two times a day and spironolactone for now even though weights down as still up from baseline and losing water weight at this time.   5. Atrial  fibrillation: rate uncontrolled at times 90s. On digoxin, metoprolol.  hold parameters. On lovenox x 14 days, may restart coumadin 5mg daily on 11/13. INR on 11/15. On lovenox until then. Previously increased metoprolol to 150mg in am and 100mg in evening. Digoxin level 0.5 on 11/11. Stable.   6. Seropositive RA: On abatacept injections q4h weeks, will hold while in tcu. On leflunomide hold for 2 weeks until 11/13. Prednisone taper.   7. BPH: No concerns at this time.   8. Depression and anxiety: On duloxetine.   9. Gout: On allopurinol.   10. Vitamin d deficiency: on vitamin d 50,000 u weekly, vit d level 41 on 11/11. Stable.   11. GERD: On lansoprazole.   12. Hypomagnesemia: on mag ox. Mg 1.8 on 11/11. Stable.   13. Hypokalemia: on kcl. k 3.9 on 11/11.   14. Constipation: on miralax daily, senna 2 tabs daily. No recent concerns. Will dc stop dates on stool softeners.   15. Insomnia: On trazodone at bedtime, ambien at bedtime prn. Will change ambien to 2200 scheduled.   16. Research study medication: On 10mg daily, patient has own supply and unknown if on medication or placebo. He is on study through cardiology office for having history of 2 MI and diabetes to see if this reduces risk of recurrent Mis.   17. Type 2 DM: reports diet controlled. No hga1c found in epic system. Reports well managed and checks with pcp appointments. hga1c 6.0 on 11/11 stable. No need for meds at this time.   18. MARYELLEN: previously refused CPAP.   19. Obesity: 222-214lbs baseline 204lbs but fluid overloaded. Counseling regarding healthier lifestyle, weight loss, dietary habits, increasing physical activity.   20. CKD stage 3: Cr0.82 on 11/1. Cr 1.00 on 11/11. Stable.   21. Anemia of chronic disease: Hg 11.1 on 11/11. Stable.   22. Dry skin: ordered aquaphor at bedtime scheduled. Needs nail care as well.     Weekly labs      Electronically signed by: Cinthya Gavin, NP    Total floor/unit time spent 35 min with 25 min spent on counseling and  coordination of care. Counseling regarding pain management, dry skin, nail care, fever evaluation, lab monitoring, edema management, weight loss. Coordinated care regarding pain management, weight loss, fluid overload management, sepsis management, lab monitoring.

## 2021-06-19 NOTE — LETTER
Letter by Aliza Perea MD at      Author: Aliza Perea MD Service: -- Author Type: --    Filed:  Encounter Date: 11/7/2019 Status: Signed         Patient: Noe Mason   MR Number: [id number]   YOB: 1950   Date of Visit: 11/7/2019     [facility]      Facility:    [facility] [[id number]]  Code Status: FULL CODE      Chief Complaint/Reason for Visit:  Chief Complaint   Patient presents with   ? H & P     post op infection T fusion       HPI:   Noe is a 69 y.o. 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    He lives in a house with his wife, uses a rolling walker, but he has had numerous falls at home. Back in early September 2019, he had a fall , resulting in he had a T9/T10 displaced fracture.  He had T8-T11 posterior spinal fusion for an unstable T9-T10 fracture.  He had been discharged to [facility] T[facility].    He had been discharged home, but came back couple days later with weakness, inability to stand.  He was discharged again to [facility] transitional care after lumbar and thoracic spine MRIs and MRI of the brain showed no change.  Discharged home, but was readmitted again the following day, and again transition to [facility] TCU, where he had changes in his operative wound    He is wound did not heal well, extra protein was not helpful, and he developed a superficial infection with purulent drainage  in his operative wound.  He was admitted and surgical site infection = MSSA.  He had a thoracic incision and debridement with evacuation of a superficial abscess, and irrigation of the deep tissues.  He was initially on vancomycin, and IV changed it to  cefazolin.  His rheumatoid arthritis medications were held.  PICC line was placed and he was discharged with a plan for prolonged IV antibiotic therapy.    He transferred to [facility] for therapies      Past Medical History:  Past Medical History:   Diagnosis Date   ? (HFpEF) heart failure with preserved ejection fraction (H)    ? A-fib (H)    ? CAD (coronary artery disease)    ? Depression with anxiety    ? Diverticulosis    ? Gout    ? MARYELLEN (obstructive sleep apnea)    ? RA (rheumatoid arthritis) (H)    ? Renal infarction (H)            Surgical History:  Past Surgical History:   Procedure Laterality Date   ? CARDIAC CATHETERIZATION  08/06/2003   ? CHOLECYSTECTOMY  08/14/2011   ? excision of zenker's diverticulum     ? IRRIGATION AND DEBRIDEMENT SPINE  10/29/2019   ? Knee arthroscopy with medial meniscectomy Right 03/04/2011   ? left ACL repair, meniscectomy  1990s   ? MASTOIDECTOMY; COMPLETE Left 11/2008   ? NM CARD EXERCISE REST/STRESS SPECT  09/2010   ? OUTER EAR SURGERY PROC UNLISTED     ? Right shoulder subacromal decompression and distal clavical resection  09/2010   ? SPINAL FUSION  09/11/2019    T8-11 posterior spinal fusion        Family History:   Family History   Problem Relation Age of Onset   ? Cataracts Mother    ? Coronary artery disease Mother    ? Hypertension Mother    ? Alcohol abuse Father    ? Cancer Father         throat   ? Cataracts Father    ? Other Father         domestic violence   ? Obesity Sister    ? Other Sister         suicide   ? Glaucoma Sister    ? Coronary artery disease Brother    ? Other Brother         deafness   ? Hypertension Brother        Social History:    Social History     Socioeconomic History   ? Marital status:      Spouse name: Not on file   ? Number of children: Not on file   ? Years of education: Not on file   ? Highest education level: Not on file   Occupational History   ? Not on file   Social Needs   ? Financial resource strain:  Not on file   ? Food insecurity:     Worry: Not on file     Inability: Not on file   ? Transportation needs:     Medical: Not on file     Non-medical: Not on file   Tobacco Use   ? Smoking status: Never Smoker   ? Smokeless tobacco: Never Used   Substance and Sexual Activity   ? Alcohol use: Yes     Alcohol/week: 2.0 standard drinks     Types: 2 Standard drinks or equivalent per week     Comment: 2-3 times per month   ? Drug use: Not on file   ? Sexual activity: Not on file   Lifestyle   ? Physical activity:     Days per week: Not on file     Minutes per session: Not on file   ? Stress: Not on file   Relationships   ? Social connections:     Talks on phone: Not on file     Gets together: Not on file     Attends Rastafarian service: Not on file     Active member of club or organization: Not on file     Attends meetings of clubs or organizations: Not on file     Relationship status: Not on file   ? Intimate partner violence:     Fear of current or ex partner: Not on file     Emotionally abused: Not on file     Physically abused: Not on file     Forced sexual activity: Not on file   Other Topics Concern   ? Not on file   Social History Narrative   ? Not on file          Review of Systems   Pain is not severe  He is been working on car transfers, his last covered day at [facility] = 11/11/2019, plan to discharge to [facility] assisted living rather than to home.  His wife cells their home, she will also moved to the assisted living apartment  The remainder of the comprehensive review of systems is negative    Blood pressure 126/77, pulse 92, temperature 97.4  F (36.3  C), resp. rate 18, weight 217 lb 6.4 oz (98.6 kg), SpO2 94 %.    SLUMS = 21/30    Physical Exam  Constitutional:       General: He is not in acute distress.     Appearance: Normal appearance. He is obese.   HENT:      Right Ear: External ear normal.      Left Ear: External ear normal.      Mouth/Throat:      Mouth: Mucous membranes are moist.   Eyes:      Extraocular  Movements: Extraocular movements intact.      Conjunctiva/sclera: Conjunctivae normal.   Neck:      Musculoskeletal: No neck rigidity.   Cardiovascular:      Rate and Rhythm: Normal rate. Rhythm irregular.   Pulmonary:      Breath sounds: Normal breath sounds. No wheezing or rales.   Abdominal:      General: Bowel sounds are normal.      Palpations: Abdomen is soft.      Tenderness: There is no abdominal tenderness.   Musculoskeletal:         General: No tenderness.      Comments: PICC in his right arm  2+ lower extremity edema   Skin:     Findings: No rash.      Comments: Thoracic incision with mild erythema, no discharge   Neurological:      General: No focal deficit present.      Mental Status: He is alert and oriented to person, place, and time.      Motor: No weakness.   Psychiatric:         Mood and Affect: Mood normal.         Thought Content: Thought content normal.       Allergies   Allergen Reactions   ? Atorvastatin      Muscle pain   ? Lisinopril Cough       Medication List:  Current Outpatient Medications   Medication Sig   ? acetaminophen (TYLENOL) 325 MG tablet Take 650 mg by mouth every 6 (six) hours as needed for pain.   ? allopurinol (ZYLOPRIM) 300 MG tablet Take 300 mg by mouth daily.   ? aspirin 81 mg chewable tablet Chew 81 mg daily.          ? bumetanide (BUMEX) 2 MG tablet Take 2 mg by mouth 2 (two) times a day at 9am and 6pm.   ? cefazolin sodium/dextrose,iso (CEFAZOLIN IN DEXTROSE, ISO-OS,) 2 gram/50 mL PgBk Infuse 2 g into a venous catheter every 8 (eight) hours.   ? chlorhexidine gluconate 2 % Liqd Apply topically once a week. Apply to PICC  Site topically one time a day every 7 day(s) for PICC  Site Care - change dressing every 7 days Clean site  with Chlorhexidrine Gluconate 2%; use securement  device; apply impregnated anti-microbial disc to insert  site; cover with a transparent semipermeable  membrane   ? cholecalciferol, vitamin D3, 1,000 unit (25 mcg) tablet Take 2,000 Units by  mouth daily.   ? digoxin (LANOXIN) 125 mcg tablet Take 125 mcg by mouth daily.   ? DULoxetine (CYMBALTA) 60 MG capsule Take 60 mg by mouth daily.   ? [START ON 11/13/2019] enoxaparin (LOVENOX) 100 mg/mL Syrg Inject 100 mg under the skin every 12 (twelve) hours.   ? ergocalciferol (ERGOCALCIFEROL) 50,000 unit capsule Take 50,000 Units by mouth once a week.   ? gabapentin (NEURONTIN) 100 MG capsule Take 500 mg by mouth 3 (three) times a day.   ? HYDROcodone-acetaminophen 5-325 mg per tablet Take 1-2 tablets by mouth every 4 (four) hours as needed for pain.          ? hydrOXYzine pamoate (VISTARIL) 25 MG capsule Take 25-50 mg by mouth every 6 (six) hours as needed for itching.   ? lansoprazole (PREVACID) 30 MG capsule Take 30 mg by mouth daily.    ? [START ON 12/13/2019] leflunomide (ARAVA) 20 MG tablet Take 20 mg by mouth daily.   ? metoprolol tartrate (LOPRESSOR) 50 MG tablet Take 100 mg by mouth 2 (two) times a day. 150mg in am and 100mg in evening         ? nitroglycerin (NITROSTAT) 0.4 MG SL tablet Place 0.4 mg under the tongue every 5 (five) minutes as needed for chest pain.   ? polyethylene glycol (MIRALAX) 17 gram packet Take 17 g by mouth daily.   ? predniSONE (DELTASONE) 1 MG tablet Take by mouth Take 4 Tablets by mouth daily for 14 days, THEN 3 Tablets daily for 14 days, THEN 2 Tablets daily for 14 days, THEN 1 Tablet daily for 14 days .   ? senna (SENOKOT) 8.6 mg tablet Take 2 tablets by mouth daily.   ? spironolactone (ALDACTONE) 25 MG tablet Take 12.5 mg by mouth daily.   ? traZODone (DESYREL) 50 MG tablet Take 50 mg by mouth at bedtime.   ? [START ON 11/13/2019] warfarin (COUMADIN) 2.5 MG tablet Take 5 mg by mouth daily. inr on 11/15         ? zolpidem (AMBIEN) 10 mg tablet Take 10 mg by mouth at bedtime.              Labs:    Ref Range & Units 11/5/19 0615 11/4/19 0619 11/4/19 0619    Sodium 136 - 145 mmol/L 143       Potassium 3.5 - 5.0 mmol/L 3.9       Chloride 98 - 107 mmol/L 100       CO2 22 - 31  mmol/L 34High        Anion Gap, Calculation 5 - 18 mmol/L 9       Glucose 70 - 125 mg/dL 90       Calcium 8.5 - 10.5 mg/dL 9.9       BUN 8 - 22 mg/dL 11  12      Creatinine 0.70 - 1.30 mg/dL 0.93   0.84     GFR MDRD Af Amer >60 mL/min/1.73m2 >60   >60     GFR MDRD Non Af Amer >60 mL/min/1.73m2 >60          Ref Range & Units 11/5/19 0615 11/4/19 0619    WBC 4.0 - 11.0 thou/uL 6.8  6.7     RBC 4.40 - 6.20 mill/uL 3.83Low   3.57Low      Hemoglobin 14.0 - 18.0 g/dL 10.3Low   9.6Low      Hematocrit 40.0 - 54.0 % 35.6Low   32.9Low      MCV 80 - 100 fL 93  92     MCH 27.0 - 34.0 pg 26.9Low   26.9Low      MCHC 32.0 - 36.0 g/dL 28.9Low   29.2Low      RDW 11.0 - 14.5 % 16.1High   16.1High      Platelets 140 - 440 thou/uL 303  284     MPV 8.5 - 12.5 fL 10.6  10.7     Neutrophils % 50 - 70 % 63  67        Ref Range & Units 11/5/19 0615 11/4/19 0619    CRP 0.0 - 0.8 mg/dL 5.5High   6.6High         Ref Range & Units 11/5/19 0615    Digoxin 0.5 - 2.0 ng/mL 0.5          Assessment / Plan:    ICD-10-CM    1. Acute on chronic heart failure with preserved ejection fraction (H) I50.33  Bumex, spironolactone, metoprolol   2. Status post thoracic spinal fusion Z98.1  Norco as needed, gabapentin   3. Postoperative wound infection: MSSA T81.49XA  PICC line with cefazolin and   4. Benign hypertension with chronic kidney disease, stage III (H) I12.9     N18.3    5. Permanent atrial fibrillation I48.21  warfarin, digoxin   6. Lymphedema of both lower extremities I89.0    7. Depression with anxiety F41.8  Cymbalta which would also help with his pain.  Trazodone and Ambien for sleep   8. Class 1 obesity due to excess calories with serious comorbidity and body mass index (BMI) of 34.0 to 34.9 in adult E66.09     Z68.34    9. Stented coronary artery  Z95.5  current symptoms   10.  Rheumatoid Arthritis, seropositive   occasions on hold well treating wound infection.   11. Falls frequently R29.6  moving to [facility] previous TCU therapy completes            Electronically signed by: Aliza Perea MD

## 2021-06-19 NOTE — LETTER
Letter by Cinthya Alonso NP at      Author: Cinthya Alonso NP Service: -- Author Type: --    Filed:  Encounter Date: 2019 Status: Signed         Patient: Noe Mason   MR Number: [id number]   YOB: 1950   Date of Visit: 2019                 [facility]    DATE: 2019    NAME:  Noe Mason             :  1950  MRN: [id number]  CODE STATUS:  FULL CODE    VISIT TYPE: Problem Visit (pain)     FACILITY:  [facility] [[id number]]       CHIEF COMPLAIN/REASON FOR VISIT:    Chief Complaint   Patient presents with   ? Problem Visit     pain               HISTORY OF PRESENT ILLNESS: Noe Mason is a 69 y.o. male who was admitted  for T8-T11 spinal fusion due to unstable T9-10 fracture after multiple falls. He then presented to ED at [facility] on 10/28 for wound infection. He underwent I&D on 10/29 and cultures grew MSSA. ID consulted and changed vanco to cefazolin. This was recommended for 6 weeks. He had post op anemia but did not require any blood transfusions. He had venous dopplers of ble that were negative for dvt. F/u with ID in 2 weeks. He was recommended to transfer to TCU for further rehab. He has PMH of frequent falls, CAD s/p stenting, Diastolic CHF, A fib on chronic warfarin, renal infarction s/p emboli, seropositive RA, Gout, MARYELLEN, BPH, urinary retention, depression and anxiety. Prior to this he lived with wife in two story house. He is retired from [employer].     Today Mr. Mason is seen for follow up on pain. He reports that he is having more pain in his right knee. He says he got a brace but it is too tight. He says the pain is in his knee and thigh area. He has previously had this pain it just seems worse recently. He has been worked up by several providers and was told he had arthritis but they could not find anything really wrong with his back.  He says he had an EMG even of this leg and did not show anything.  He has had a lot of problems with his right arm and shoulder too and wondering if he can have an order for hot packs. This helps with his shoulder pain. He says that he previously had an order to do an EMG of his right arm but he has not scheduled this yet. He is not sure he wants to do that. He is still having night sweats but says he was reassured that he has not had any fevers and his blood cultures were negative. He is going to see ID today. He plans to mention this to them today. He says he is no longer moving to assisted living and is planning to return home because he and his wife decided it was too expensive. He says he still has some black spots on his skin. He is still using the norco for pain but more for his knee pain than anything else. He had his stitches and staples removed from his back incision so this feels better now. He does think his right leg is more swollen than it has been before. He says therapy had talked about being done 11/20 but now they are saying longer. He thinks he may be able to go home at that time. He is not sure he will stay until he is done with the antibiotics but he is going to discuss this with his infectious disease doctor. He is wondering if he can be done with lovenox and discussed his inr yesterday was 1.9 so will stop this at this time and continue the 5mg of coumadin. We will recheck his inr on 11/20.     REVIEW OF SYSTEMS:  PROBLEMS AND REVIEW OF SYSTEMS:   Today on ROS:   Currently, no fever, chills, or rigors. Decreased vision-wears glasses. Denies any chest pain, headaches, palpitations, lightheadedness, dizziness, shortness of breath, or cough. Appetite is good. Denies any GERD symptoms. Denies any difficulty with swallowing, nausea, or vomiting.  Denies any abdominal pain, diarrhea or constipation. Denies any urinary symptoms. No insomnia. No active bleeding. No rash. Positive for ambulates with walker, back incision, pain controlled in back but worse in right  knee, right knee brace, leg swelling improved, night sweats, but no fevers or chills, iv antibiotics, picc line, dry skin, right shoulder pain      Allergies   Allergen Reactions   ? Atorvastatin      Muscle pain   ? Lisinopril Cough     Current Outpatient Medications   Medication Sig   ? acetaminophen (TYLENOL) 325 MG tablet Take 650 mg by mouth every 6 (six) hours as needed for pain.   ? allopurinol (ZYLOPRIM) 300 MG tablet Take 300 mg by mouth daily.   ? aspirin 81 mg chewable tablet Chew 81 mg daily.          ? bumetanide (BUMEX) 2 MG tablet Take 2 mg by mouth 2 (two) times a day at 9am and 6pm.   ? cefazolin sodium/dextrose,iso (CEFAZOLIN IN DEXTROSE, ISO-OS,) 2 gram/50 mL PgBk Infuse 2 g into a venous catheter every 8 (eight) hours.   ? chlorhexidine gluconate 2 % Liqd Apply topically once a week. Apply to PICC  Site topically one time a day every 7 day(s) for PICC  Site Care - change dressing every 7 days Clean site  with Chlorhexidrine Gluconate 2%; use securement  device; apply impregnated anti-microbial disc to insert  site; cover with a transparent semipermeable  membrane   ? digoxin (LANOXIN) 125 mcg tablet Take 125 mcg by mouth daily.   ? DULoxetine (CYMBALTA) 60 MG capsule Take 60 mg by mouth daily.   ? ergocalciferol (ERGOCALCIFEROL) 50,000 unit capsule Take 50,000 Units by mouth once a week.   ? gabapentin (NEURONTIN) 100 MG capsule Take 600 mg by mouth 3 (three) times a day.          ? HYDROcodone-acetaminophen 5-325 mg per tablet Take 2 tablets by mouth every 6 (six) hours as needed for pain.          ? lansoprazole (PREVACID) 30 MG capsule Take 30 mg by mouth daily.    ? [START ON 12/13/2019] leflunomide (ARAVA) 20 MG tablet Take 20 mg by mouth daily.   ? metoprolol tartrate (LOPRESSOR) 50 MG tablet Take 100 mg by mouth 2 (two) times a day. 150mg in am and 100mg in evening         ? predniSONE (DELTASONE) 1 MG tablet Take by mouth Take 4 Tablets by mouth daily for 14 days, THEN 3 Tablets daily for  14 days, THEN 2 Tablets daily for 14 days, THEN 1 Tablet daily for 14 days .   ? spironolactone (ALDACTONE) 25 MG tablet Take 12.5 mg by mouth daily.   ? traZODone (DESYREL) 50 MG tablet Take 50 mg by mouth at bedtime.   ? trolamine salicylate (ASPERCREME) 10 % cream Apply 1 application topically 2 (two) times a day. And q6h prn   ? warfarin sodium (WARFARIN ORAL) Take by mouth. 11/20/19 INR 2.43 Cont 5mg daily. NExt INR 11/25 by [facility].  11/18/19 INR 1.90  DC Lovenox.  Continue 5mg daily.  Next INR 11/20/19.    11/15/19 INR 1.32  Cont 5mg daily and Lovenox 100mg two times a day.  Next INR 11/18/19.         ? white petrolatum (AQUAPHOR ORIGINAL) 41 % Oint Apply 1 application topically at bedtime.   ? zolpidem (AMBIEN) 10 mg tablet Take 10 mg by mouth at bedtime.            Past Medical History:    Past Medical History:   Diagnosis Date   ? (HFpEF) heart failure with preserved ejection fraction (H)    ? A-fib (H)    ? CAD (coronary artery disease)    ? Depression with anxiety    ? Diverticulosis    ? Gout    ? MARYELLEN (obstructive sleep apnea)    ? RA (rheumatoid arthritis) (H)    ? Renal infarction (H)            PHYSICAL EXAMINATION  Vitals:    11/18/19 2219   BP: 120/74   Pulse: (!) 103   Resp: 18   Temp: 99  F (37.2  C)   SpO2: 97%   Weight: 214 lb (97.1 kg)       Today on physical exam:     GENERAL: Awake, Alert, oriented x3, not in any form of acute distress, answers questions appropriately, follows simple commands, conversant, obese  HEENT: Head is normocephalic with normal hair distribution. No evidence of trauma. Ears: No acute purulent discharge. Eyes: Conjunctivae pink with no scleral jaundice. Nose: Normal mucosa and septum. NECK: Supple with no cervical or supraclavicular lymphadenopathy. Trachea is midline. glasses  CHEST: No tenderness or deformity, no crepitus  LUNG: dim to auscultation with good chest expansion. There are no crackles or wheezes, normal AP diameter. No shortness of breath or cough  BACK:  midline thoracic incision staples/sutures removed c/d/i  CVS: irregularly irregular rhythm, there are no murmurs, rubs, gallops, or heaves,  2+ pulses symmetric in all extremities.   ABDOMEN: Rounded and soft, nontender to palpation, non distended, no masses, no organomegaly, good bowel sounds, no rebound or guarding, no peritoneal signs.   EXTREMITIES: 1+ ble edema, tubigrips, RUE PICC line c/d/i, no erythema, edema, warmth, right knee 1+ edema  SKIN: Warm and dry,dry cracked skin on bilateral feet  NEUROLOGICAL: The patient is oriented to person, place and time. Strength and sensation are grossly intact. Face is symmetric.            LABS:   Recent Results (from the past 168 hour(s))   INR   Result Value Ref Range    INR 1.32 (H) 0.90 - 1.10   Alkaline Phosphatase   Result Value Ref Range    Alkaline Phosphatase 145 (H) 45 - 120 U/L   Basic Metabolic Panel   Result Value Ref Range    Sodium 141 136 - 145 mmol/L    Potassium 4.2 3.5 - 5.0 mmol/L    Chloride 96 (L) 98 - 107 mmol/L    CO2 35 (H) 22 - 31 mmol/L    Anion Gap, Calculation 10 5 - 18 mmol/L    Glucose 91 70 - 125 mg/dL    Calcium 10.8 (H) 8.5 - 10.5 mg/dL    BUN 23 (H) 8 - 22 mg/dL    Creatinine 1.28 0.70 - 1.30 mg/dL    GFR MDRD Af Amer >60 >60 mL/min/1.73m2    GFR MDRD Non Af Amer 56 (L) >60 mL/min/1.73m2   C-Reactive Protein   Result Value Ref Range    CRP 6.6 (H) 0.0 - 0.8 mg/dL   HM1 (CBC with Diff)   Result Value Ref Range    WBC 5.8 4.0 - 11.0 thou/uL    RBC 4.35 (L) 4.40 - 6.20 mill/uL    Hemoglobin 11.8 (L) 14.0 - 18.0 g/dL    Hematocrit 40.1 40.0 - 54.0 %    MCV 92 80 - 100 fL    MCH 27.1 27.0 - 34.0 pg    MCHC 29.4 (L) 32.0 - 36.0 g/dL    RDW 16.4 (H) 11.0 - 14.5 %    Platelets 324 140 - 440 thou/uL    MPV 11.4 8.5 - 12.5 fL    Neutrophils % 56 50 - 70 %    Lymphocytes % 29 20 - 40 %    Monocytes % 9 2 - 10 %    Eosinophils % 5 0 - 6 %    Basophils % 1 0 - 2 %    Neutrophils Absolute 3.3 2.0 - 7.7 thou/uL    Lymphocytes Absolute 1.7 0.8 - 4.4  thou/uL    Monocytes Absolute 0.5 0.0 - 0.9 thou/uL    Eosinophils Absolute 0.3 0.0 - 0.4 thou/uL    Basophils Absolute 0.1 0.0 - 0.2 thou/uL   INR   Result Value Ref Range    INR 1.90 (H) 0.90 - 1.10   INR   Result Value Ref Range    INR 2.43 (H) 0.90 - 1.10     Results for orders placed or performed in visit on 11/18/19   Basic Metabolic Panel   Result Value Ref Range    Sodium 141 136 - 145 mmol/L    Potassium 4.2 3.5 - 5.0 mmol/L    Chloride 96 (L) 98 - 107 mmol/L    CO2 35 (H) 22 - 31 mmol/L    Anion Gap, Calculation 10 5 - 18 mmol/L    Glucose 91 70 - 125 mg/dL    Calcium 10.8 (H) 8.5 - 10.5 mg/dL    BUN 23 (H) 8 - 22 mg/dL    Creatinine 1.28 0.70 - 1.30 mg/dL    GFR MDRD Af Amer >60 >60 mL/min/1.73m2    GFR MDRD Non Af Amer 56 (L) >60 mL/min/1.73m2         Lab Results   Component Value Date    WBC 5.8 11/18/2019    HGB 11.8 (L) 11/18/2019    HCT 40.1 11/18/2019    MCV 92 11/18/2019     11/18/2019       No results found for: [id number]  Lab Results   Component Value Date    HGBA1C 6.0 11/11/2019     Lab Results   Component Value Date    INR 2.43 (H) 11/20/2019    INR 1.90 (H) 11/18/2019    INR 1.32 (H) 11/15/2019     Vitamin D, Total (25-Hydroxy)   Date Value Ref Range Status   11/11/2019 41.0 30.0 - 80.0 ng/mL Final     No results found for: TSH        ASSESSMENT/PLAN:    1. S/p T8-T11 spinal fusion, MSSA wound infection, s/p I&D:  Incision c/d/i staples and sutures removed. Cefazolin q8h x 6 weeks. RUE PICC line c/d/i. F/u with ID 11/19, reviewed note and recommending repeat thoracic spine MRI prior to stopping antibiotics, elevating CRP and elevated creatinine, continue to monitor night sweats continue IV antibiotics until 12/12, mri 12/2, weekly labs until then. F/u with surgeon on 11/15-sutures and staples removed, doing well, f/u in few weeks. Pain controlled on tylenol three times a day, gabapentin, norco prn.  Max of tylenol 4gm from all sources.  IS q1h while awake.   No further fevers but  continues to have night sweats. Blood cultures negative from last week. Follow sup with Id today. Discussing returning home.   2. Frequent falls, Right knee pain: PT, OT. Reports knees and legs buckling, new brace but reports increased pain and too tight. Moving to [place]. Ambulates with walker. Knee pain,  trolamine cream two times a day and prn. Using norco for knee pain and not back pain. Encouraged to wean off and will change to 10-325mg q5h prn #20 only for a few days only to overcome increased knee pain. Due to increased edema in rle today will order venous doppler to rule out dvt. Increase gabapentin to 600mg three times a day. Warm packs prn.   3. CAD, s/p drug alluding stent: No chest pain recently. On aspirin, nitro prn. Metoprolol.   4. Diastolic CHF: daily weights [phone]. Baseline weight around 204lbs. 1+ edema, no shortness of breath today. On bumex 2mg two times a day, spironolactone. Heart healthy diet ordered. elier levy. Stable recently.   5. Atrial fibrillation: rate controlled at times 90s. On digoxin, metoprolol.  hold parameters. Stopped lovenox yesterday, inr 1.9, continue coumadin 5mg daily. Recheck inr on 11/20. Previously increased metoprolol to 150mg in am and 100mg in evening. Digoxin level 0.5 on 11/11. Stable.   6. Seropositive RA: On abatacept injections q4h weeks, will hold while in tcu. On leflunomide held until 11/13. Prednisone taper.   7. BPH: No concerns at this time.   8. Depression and anxiety: On duloxetine. Mood stable.   9. Gout: On allopurinol.   10. Vitamin d deficiency: on vitamin d 50,000 u weekly, vit d level 41 on 11/11. Stable.   11. GERD: On lansoprazole.   12. Hypomagnesemia: on mag ox. Mg 1.8 on 11/11. Stable.   13. Hypokalemia: on kcl. k 3.9 on 11/11.   14. Constipation: on miralax daily, senna 2 tabs daily. No recent concerns. Stable.   15. Insomnia: On trazodone at bedtime, ambien at bedtime prn. No further concerns.    16. Research study medication:  On 10mg daily, patient has own supply and unknown if on medication or placebo. He is on study through cardiology office for having history of 2 MI and diabetes to see if this reduces risk of recurrent Mis.   17. Type 2 DM: reports diet controlled. No hga1c found in epic system. Reports well managed and checks with pcp appointments. hga1c 6.0 on 11/11 stable. No need for meds at this time.   18. MARYELLEN: previously refused CPAP. No concerns.   19. Obesity: [phone] baseline 204lbs but fluid overloaded. Counseling regarding healthier lifestyle, weight loss, dietary habits, increasing physical activity.   20. CKD stage 3: Cr0.82 on 11/1. Cr 1.00 on 11/11. Stable.   21. Anemia of chronic disease: Hg 11.1 on 11/11. Stable.   22. Dry skin: aquaphor at bedtime scheduled. Needs nail care as well.     Weekly labs    Per therapy firm 11/25 mod ind for adls, toileting sup, 200 feet with sup, stairs with stand by assist, knee brace needs refitting-he is contacting orthotist clinic. 27 slums. Pt, ot, hha. May skill until complete iv antibiotics.     Electronically signed by: Cinthya Alonso NP      Total floor/unit time spent 35 min with 25 min spent on counseling and coordination of care. Counseling regarding pain management, right knee pain evaluation, night sweats workup, need for venous doppler, knee brace, change in pain medicine, change in gabapentin, iv antibiotic management. Coordinated care with nursing for management of iv antibiotics, sepsis, wound infection, surgical follow up, ID follow up, lab monitoring, pain management.

## 2021-06-19 NOTE — LETTER
Letter by Cinthya Alonso NP at      Author: Cinthya Alonso NP Service: -- Author Type: --    Filed:  Encounter Date: 2019 Status: Signed         Patient: Noe Mason   MR Number: [id number]   YOB: 1950   Date of Visit: 2019                 [facility]    DATE: 2019    NAME:  Noe Mason             :  1950  MRN: [id number]  CODE STATUS:  FULL CODE    VISIT TYPE: Discharge Summary     FACILITY:  [facility] [[id number]]       CHIEF COMPLAIN/REASON FOR VISIT:    Chief Complaint   Patient presents with   ? Discharge Summary               HISTORY OF PRESENT ILLNESS: Noe Mason is a 69 y.o. male who was admitted  for T8-T11 spinal fusion due to unstable T9-10 fracture after multiple falls. He then presented to ED at [facility] on 10/28 for wound infection. He underwent I&D on 10/29 and cultures grew MSSA. ID consulted and changed vanco to cefazolin. This was recommended for 6 weeks. He had post op anemia but did not require any blood transfusions. He had venous dopplers of ble that were negative for dvt. F/u with ID in 2 weeks. He was recommended to transfer to TCU for further rehab. He has PMH of frequent falls, CAD s/p stenting, Diastolic CHF, A fib on chronic warfarin, renal infarction s/p emboli, seropositive RA, Gout, MARYELLEN, BPH, urinary retention, depression and anxiety. Prior to this he lived with wife in two story house. He is retired from [employer].     TCU course:   Mr. Mason has made progress with therapy and is ambulating 200 feet with walker and supervision. He has done stairs with stand by assist. He is independent for most ADLs and toileting is supervision. He scored 27/30 on slums. During his stay his incision has healed well and staples and sutures were removed on 11/15. He continues on cefazolin without any concerns with his antibiotic therapy and picc line. He has been having weekly labs that are  improving. He did have a follow up with ID on 11/19. They recommended a thoracic spine MRI on 12/2 prior to stopping Iv antibiotics on 12/12. He did develop night sweats during his tcu course and his labs were stable, no fevers, and blood cultures were repeated and negative. He also had a tb skin test that was negative during his stay. He did update ID on this at his follow up and they reported to continue to monitor. His pain in back has been well controlled on gabapentin, tylenol, norco prn. He was nearly weaned off the norco when his right knee became increasingly painful. He was having pain in thigh down to the knee with buckling. He has had this issues for the last several months and previously had xray and imaging of thigh and lumbar spine. He does have known arthritis. Due to the sudden increase in pain and increased edema to rle venous doppler obtained and was negative for dvt. He was fitted with a right knee brace that he is now using. He is now using norco 10-325mg 1 every 6 hours as needed due to severe pain in knee and was sent home with small supply of this with orders to wean off at home. He will notify pcp if his knee pain is not improved in next week. His gabapentin was also increased to 600mg three times a day (max dose). His weights were trending down and then stabilized at 214lbs. HE was restarted on warfarin after bridging with lovenox. His inr is now therapeutic and he is off the lovenox. He will be due next for INR on 11/25. His leflunomide was held for 2 weeks postop as well. His bowels were managed with bowel regimen. His hga1c was 6.0 so no monitoring was needed. He is diet controlled. He was planning to move into [facility] due to his frequent falls in the last few months but then he and wife decided they could not afford this. He was not scheduled to discharge from facility by therapy until next week and could remain for iv antibiotics until completed but he is requesting early discharge  today. ID has arranged for home infusion of iv antibiotics and he and his wife were taught to do this themselves. He will be returning home today with  PT, OT, HHA, RN, and home infusion pharmacy. He will f/u with PCP in 5-7 days.      REVIEW OF SYSTEMS:  PROBLEMS AND REVIEW OF SYSTEMS:   Today on ROS:   Currently, no fever, chills, or rigors. Decreased vision-wears glasses. Denies any chest pain, headaches, palpitations, lightheadedness, dizziness, shortness of breath, or cough. Appetite is good. Denies any GERD symptoms. Denies any difficulty with swallowing, nausea, or vomiting.  Denies any abdominal pain, diarrhea or constipation. Denies any urinary symptoms. No insomnia. No active bleeding. No rash. Positive for ambulates with walker, back incision, pain controlled in back but worse in right knee, right knee brace, leg swelling improved, night sweats, but no fevers or chills, iv antibiotics, picc line, dry skin, right shoulder pain      Allergies   Allergen Reactions   ? Atorvastatin      Muscle pain   ? Lisinopril Cough     Current Outpatient Medications   Medication Sig   ? HYDROcodone-acetaminophen (NORCO )  mg per tablet Take 1 tablet by mouth every 6 (six) hours as needed for pain.   ? acetaminophen (TYLENOL) 325 MG tablet Take 650 mg by mouth every 6 (six) hours as needed for pain.   ? allopurinol (ZYLOPRIM) 300 MG tablet Take 300 mg by mouth daily.   ? aspirin 81 mg chewable tablet Chew 81 mg daily.          ? bumetanide (BUMEX) 2 MG tablet Take 2 mg by mouth 2 (two) times a day at 9am and 6pm.   ? cefazolin sodium/dextrose,iso (CEFAZOLIN IN DEXTROSE, ISO-OS,) 2 gram/50 mL PgBk Infuse 2 g into a venous catheter every 8 (eight) hours.   ? chlorhexidine gluconate 2 % Liqd Apply topically once a week. Apply to PICC  Site topically one time a day every 7 day(s) for PICC  Site Care - change dressing every 7 days Clean site  with Chlorhexidrine Gluconate 2%; use securement  device; apply  impregnated anti-microbial disc to insert  site; cover with a transparent semipermeable  membrane   ? digoxin (LANOXIN) 125 mcg tablet Take 125 mcg by mouth daily.   ? DULoxetine (CYMBALTA) 60 MG capsule Take 60 mg by mouth daily.   ? ergocalciferol (ERGOCALCIFEROL) 50,000 unit capsule Take 50,000 Units by mouth once a week.   ? gabapentin (NEURONTIN) 100 MG capsule Take 600 mg by mouth 3 (three) times a day.          ? lansoprazole (PREVACID) 30 MG capsule Take 30 mg by mouth daily.    ? [START ON 12/13/2019] leflunomide (ARAVA) 20 MG tablet Take 20 mg by mouth daily.   ? metoprolol tartrate (LOPRESSOR) 50 MG tablet Take 100 mg by mouth 2 (two) times a day. 150mg in am and 100mg in evening         ? predniSONE (DELTASONE) 1 MG tablet Take by mouth Take 4 Tablets by mouth daily for 14 days, THEN 3 Tablets daily for 14 days, THEN 2 Tablets daily for 14 days, THEN 1 Tablet daily for 14 days .   ? spironolactone (ALDACTONE) 25 MG tablet Take 12.5 mg by mouth daily.   ? traZODone (DESYREL) 50 MG tablet Take 50 mg by mouth at bedtime.   ? trolamine salicylate (ASPERCREME) 10 % cream Apply 1 application topically 2 (two) times a day. And q6h prn   ? warfarin sodium (WARFARIN ORAL) Take by mouth. 11/20/19 INR 2.43 Cont 5mg daily. NExt INR 11/25 by [facility].  11/18/19 INR 1.90  DC Lovenox.  Continue 5mg daily.  Next INR 11/20/19.    11/15/19 INR 1.32  Cont 5mg daily and Lovenox 100mg two times a day.  Next INR 11/18/19.         ? white petrolatum (AQUAPHOR ORIGINAL) 41 % Oint Apply 1 application topically at bedtime.   ? zolpidem (AMBIEN) 10 mg tablet Take 10 mg by mouth at bedtime.            Past Medical History:    Past Medical History:   Diagnosis Date   ? (HFpEF) heart failure with preserved ejection fraction (H)    ? A-fib (H)    ? CAD (coronary artery disease)    ? Depression with anxiety    ? Diverticulosis    ? Gout    ? MARYELLEN (obstructive sleep apnea)    ? RA (rheumatoid arthritis) (H)    ? Renal infarction (H)             PHYSICAL EXAMINATION  Vitals:    11/20/19 2128   BP: 121/81   Pulse: 84   Resp: 18   Temp: 96.9  F (36.1  C)   SpO2: 98%   Weight: 213 lb (96.6 kg)       Today on physical exam:     GENERAL: Awake, Alert, oriented x3, not in any form of acute distress, answers questions appropriately, follows simple commands, conversant, obese  HEENT: Head is normocephalic with normal hair distribution. No evidence of trauma. Ears: No acute purulent discharge. Eyes: Conjunctivae pink with no scleral jaundice. Nose: Normal mucosa and septum. NECK: Supple with no cervical or supraclavicular lymphadenopathy. Trachea is midline. glasses  CHEST: No tenderness or deformity, no crepitus  LUNG: dim to auscultation with good chest expansion. There are no crackles or wheezes, normal AP diameter. No shortness of breath or cough  BACK: midline thoracic incision staples/sutures removed, small open area in middle of incision where scabbed apparently rubbed off during night, few drops of sanguinous discharge on sheets, asked nursing to place steri strip  CVS: irregularly irregular rhythm, there are no murmurs, rubs, gallops, or heaves,  2+ pulses symmetric in all extremities.   ABDOMEN: Rounded and soft, nontender to palpation, non distended, no masses, no organomegaly, good bowel sounds, no rebound or guarding, no peritoneal signs.   EXTREMITIES: 1+ ble edema, tubigrips, RUE PICC line c/d/i, no erythema, edema, warmth, right knee 1+ edema  SKIN: Warm and dry,dry cracked skin on bilateral feet  NEUROLOGICAL: The patient is oriented to person, place and time. Strength and sensation are grossly intact. Face is symmetric.            LABS:   Recent Results (from the past 168 hour(s))   INR   Result Value Ref Range    INR 1.32 (H) 0.90 - 1.10   Alkaline Phosphatase   Result Value Ref Range    Alkaline Phosphatase 145 (H) 45 - 120 U/L   Basic Metabolic Panel   Result Value Ref Range    Sodium 141 136 - 145 mmol/L    Potassium 4.2 3.5 - 5.0  mmol/L    Chloride 96 (L) 98 - 107 mmol/L    CO2 35 (H) 22 - 31 mmol/L    Anion Gap, Calculation 10 5 - 18 mmol/L    Glucose 91 70 - 125 mg/dL    Calcium 10.8 (H) 8.5 - 10.5 mg/dL    BUN 23 (H) 8 - 22 mg/dL    Creatinine 1.28 0.70 - 1.30 mg/dL    GFR MDRD Af Amer >60 >60 mL/min/1.73m2    GFR MDRD Non Af Amer 56 (L) >60 mL/min/1.73m2   C-Reactive Protein   Result Value Ref Range    CRP 6.6 (H) 0.0 - 0.8 mg/dL   HM1 (CBC with Diff)   Result Value Ref Range    WBC 5.8 4.0 - 11.0 thou/uL    RBC 4.35 (L) 4.40 - 6.20 mill/uL    Hemoglobin 11.8 (L) 14.0 - 18.0 g/dL    Hematocrit 40.1 40.0 - 54.0 %    MCV 92 80 - 100 fL    MCH 27.1 27.0 - 34.0 pg    MCHC 29.4 (L) 32.0 - 36.0 g/dL    RDW 16.4 (H) 11.0 - 14.5 %    Platelets 324 140 - 440 thou/uL    MPV 11.4 8.5 - 12.5 fL    Neutrophils % 56 50 - 70 %    Lymphocytes % 29 20 - 40 %    Monocytes % 9 2 - 10 %    Eosinophils % 5 0 - 6 %    Basophils % 1 0 - 2 %    Neutrophils Absolute 3.3 2.0 - 7.7 thou/uL    Lymphocytes Absolute 1.7 0.8 - 4.4 thou/uL    Monocytes Absolute 0.5 0.0 - 0.9 thou/uL    Eosinophils Absolute 0.3 0.0 - 0.4 thou/uL    Basophils Absolute 0.1 0.0 - 0.2 thou/uL   INR   Result Value Ref Range    INR 1.90 (H) 0.90 - 1.10   INR   Result Value Ref Range    INR 2.43 (H) 0.90 - 1.10     Results for orders placed or performed in visit on 11/18/19   Basic Metabolic Panel   Result Value Ref Range    Sodium 141 136 - 145 mmol/L    Potassium 4.2 3.5 - 5.0 mmol/L    Chloride 96 (L) 98 - 107 mmol/L    CO2 35 (H) 22 - 31 mmol/L    Anion Gap, Calculation 10 5 - 18 mmol/L    Glucose 91 70 - 125 mg/dL    Calcium 10.8 (H) 8.5 - 10.5 mg/dL    BUN 23 (H) 8 - 22 mg/dL    Creatinine 1.28 0.70 - 1.30 mg/dL    GFR MDRD Af Amer >60 >60 mL/min/1.73m2    GFR MDRD Non Af Amer 56 (L) >60 mL/min/1.73m2         Lab Results   Component Value Date    WBC 5.8 11/18/2019    HGB 11.8 (L) 11/18/2019    HCT 40.1 11/18/2019    MCV 92 11/18/2019     11/18/2019       No results found for:  [id number]  Lab Results   Component Value Date    HGBA1C 6.0 11/11/2019     Lab Results   Component Value Date    INR 2.43 (H) 11/20/2019    INR 1.90 (H) 11/18/2019    INR 1.32 (H) 11/15/2019     Vitamin D, Total (25-Hydroxy)   Date Value Ref Range Status   11/11/2019 41.0 30.0 - 80.0 ng/mL Final     No results found for: TSH        ASSESSMENT/PLAN:    1. S/p T8-T11 spinal fusion, MSSA wound infection, s/p I&D:  Incision c/d/i staples and sutures removed, just one small open area asked nursing to place steri strip. Cefazolin q8h x 6 weeks. RUE PICC line c/d/i. F/u with ID 11/19, reviewed note and recommending repeat thoracic spine MRI prior to stopping antibiotics, elevating CRP and elevated creatinine, continue to monitor night sweats continue IV antibiotics until 12/12, mri 12/2, weekly labs until then. F/u with surgeon on 11/15-sutures and staples removed, doing well, f/u in few weeks. Pain controlled on tylenol three times a day, gabapentin, norco prn.  Max of tylenol 4gm from all sources.  IS q1h while awake. No fevers, night sweats. No concerns today.   2. Frequent falls, Right knee pain: PT, OT. Reports knees and legs buckling, new brace but reports increased pain and too tight. Moving to [place]. Ambulates with walker. Knee pain,  trolamine cream two times a day and prn. Using norco for knee pain and not back pain, feels pain uncontrolled. changed to 10-325mg q6h prn #20 only, needs to wean off at home. Also increased gabapentin, venous doppler negative. Follow up with pcp after discharge.   3. CAD, s/p drug alluding stent: No chest pain recently. On aspirin, nitro prn. Metoprolol.   4. Diastolic CHF: daily weights [phone]. Baseline weight around 204lbs. 1+ edema, no shortness of breath today. On bumex 2mg two times a day, spironolactone. Heart healthy diet ordered. elier levy. Stable recently.   5. Atrial fibrillation: rate controlled at times 80s. On digoxin, metoprolol, warfarin.  hold  parameters.  Previously increased metoprolol to 150mg in am and 100mg in evening. Digoxin level 0.5 on 11/11. Stable. inr on 11/25.   6. Seropositive RA: On abatacept injections q4h weeks, will hold while in tcu. On leflunomide. Prednisone taper.   7. BPH: No concerns at this time.   8. Depression and anxiety: On duloxetine. Mood stable.   9. Gout: On allopurinol.   10. Vitamin d deficiency: on vitamin d 50,000 u weekly, vit d level 41 on 11/11. Stable.   11. GERD: On lansoprazole.   12. Hypomagnesemia: on mag ox. Mg 1.8 on 11/11. Stable.   13. Hypokalemia: on kcl. k 3.9 on 11/11.   14. Constipation: on miralax daily, senna 2 tabs daily. No recent concerns. Stable.   15. Insomnia: On trazodone at bedtime, ambien at bedtime prn. No further concerns.    16. Research study medication: On 10mg daily, patient has own supply and unknown if on medication or placebo. He is on study through cardiology office for having history of 2 MI and diabetes to see if this reduces risk of recurrent Mis.   17. Type 2 DM: reports diet controlled. No hga1c found in epic system. Reports well managed and checks with pcp appointments. hga1c 6.0 on 11/11 stable. No need for meds at this time.   18. MARYELLEN: previously refused CPAP. No concerns.   19. Obesity: [phone] baseline 204lbs but fluid overloaded. Counseling regarding healthier lifestyle, weight loss, dietary habits, increasing physical activity.   20. CKD stage 3: Cr0.82 on 11/1. Cr 1.00 on 11/11. Stable.   21. Anemia of chronic disease: Hg 11.1 on 11/11. Stable.   22. Dry skin: aquaphor at bedtime scheduled. Needs nail care as well.       Electronically signed by: Cinthya Alonso NP    Total floor/unit time spent 35 min with 25 min spent on counseling and coordination of care. Counseling regarding hospital course, tcu course, med changes, iv antibiotic management, lab monitoring, pain management, infectious disease follow up,mri orders, evaluation of night sweats, need  "for pcp follow up, discharge instructions. Coordinated care with nursing, therapy,  for discharge planning, services for discharge, med orders for discharge, primary care follow up, ID follow up, lab monitoring, iv antibiotic regimen for home.     Please evaluate Noe Mason for admission to Home Health.    Face to Face Attestation and Initial Plan of Care    The face-to-face encounter occurred on date: 11/21/19  Face to Face encounter was with: Cinthya Alonso    Please provide brief clinical summary of reason for visit and need for home care. Deconditioning after hospital course for wound infection, sepsis, thoracic fusion    Please identify which of the following home health disciplines the patient will need AND describe the skilled services that you would like the home health agency to perform: SKILLED NURSING (RN): complex med management, teach wound care and IV Home Infusion, PHYSICAL THERAPY: strength training and gait training, OCCUPATIONAL THERAPY: ADLs and home safety and HOME HEALTH AIDE    Homebound Status (describe the functional limitations that support this patient is confined to his/her home. Medicaid recipients are not required to be homebound.):assistive device needed:  2WW, due to infection and iv antibiotics    Name of physician who will be responsible for the ongoing home health plan of care (CMS requires the referring physician to provide the specific name of the community physician instead of a title, such as \"PCP\"): Lyle Navarrete MD    Requested Start of Care Date: Within 48 hours    Other information to assist the home health agency in developing the initial Plan of Care:    I certify that services are/were furnished while this patient was under the care of a physician and that a physician or an allowed non-physician practitioner (NPP), had a face-to-face encounter that meets the physician face-to-face encounter requirements. The encounter was in whole, or in part, " related to the primary reason for home health. The patient is confined to his/her home and needs intermittent skilled nursing, physical therapy, speech-language pathology, or the continued need for occupational therapy. A plan of care has been established by a physician and is periodically reviewed by a physician.    Post Discharge Medication Reconciliation Status: discharge medications reconciled, continue medications without change

## 2023-04-11 ENCOUNTER — LAB REQUISITION (OUTPATIENT)
Dept: LAB | Facility: CLINIC | Age: 73
End: 2023-04-11
Payer: MEDICARE

## 2023-04-11 DIAGNOSIS — Z51.81 ENCOUNTER FOR THERAPEUTIC DRUG LEVEL MONITORING: ICD-10-CM

## 2023-04-11 DIAGNOSIS — R53.1 WEAKNESS: ICD-10-CM

## 2023-04-11 PROBLEM — M05.9 SEROPOSITIVE RHEUMATOID ARTHRITIS (H): Status: ACTIVE | Noted: 2023-04-11

## 2023-04-11 PROBLEM — E11.22 TYPE 2 DIABETES MELLITUS WITH CHRONIC KIDNEY DISEASE, WITHOUT LONG-TERM CURRENT USE OF INSULIN, UNSPECIFIED CKD STAGE (H): Status: ACTIVE | Noted: 2023-04-11

## 2023-04-11 PROBLEM — M46.26 INFECTION OF LUMBAR SPINE (H): Status: ACTIVE | Noted: 2023-04-11

## 2023-04-11 PROBLEM — D84.821 IMMUNOSUPPRESSION DUE TO DRUG THERAPY (H): Status: ACTIVE | Noted: 2023-04-11

## 2023-04-11 PROBLEM — E21.3 HYPERPARATHYROIDISM, UNSPECIFIED (H): Status: ACTIVE | Noted: 2023-04-11

## 2023-04-11 PROBLEM — Z79.899 IMMUNOSUPPRESSION DUE TO DRUG THERAPY (H): Status: ACTIVE | Noted: 2023-04-11

## 2023-04-11 NOTE — PROGRESS NOTES
"[facility] GERIATRICS    PRIMARY CARE PROVIDER AND CLINIC:  Dg Anthony MD, [address]  / [place] [address]  Chief Complaint   Patient presents with     Hospital F/U      [facility] Medical Record Number:  [id number]  Place of Service where encounter took place:  [facility] (SNF) [[id number]]    Noe Mason  is a 73 year old  (1950), admitted to the above facility from  [facility] . Hospital stay 4/2/23 through 4/11/23..   HPI:   72 y.o. male with PMHx significant for HTN, HFpEF, CAD s/p PCI, afib on Warfarin, CKD, DMII who presented to [facility] ED on 4/2/23 after a ground level fall. Patient reports that in the morning of 4/2 he was walking through the house when he lost his balance and fell backward. Reports landing on his back and striking his head, causing his neck to hyperflex. He was told he had LOC by the EMS crew and was unable to get up on his own. He noted pain to his head, neck and rest of his back. No nausea, vomiting, blurred/loss of vision, new focal weakness, numbness or tingling. No chest pain, abdominal pain. In ED, he described pain to the back of his head, neck and throughout his back. He also had hip pain which he attributed to laying on the wood floor after his fall. He noted several falls in recent past, including one 3 weeks ago in a restaurant. He last took his warfarin morning of 4/2. He has an appointment with neurology for evaluation of \"weakness in all 4 extremities, jerking movements/tremors worsening, more frequent falls\". ED work up was significant for the following injuries: -- widening of C5-6 disc space with prevertebral soft tissue edema    The primary encounter diagnosis was Physical deconditioning. Diagnoses of Generalized muscle weakness, Falls frequently, Injury to ligament of cervical spine, subsequent encounter, H/O cervical spine surgery, Pain, Dysphagia, unspecified type, Essential hypertension, benign, Hyperlipidemia LDL goal " <130, Heart failure with preserved ejection fraction, NYHA class I (H), Coronary artery disease involving native coronary artery of native heart without angina pectoris, Paroxysmal atrial fibrillation (H), Chronic anticoagulation, Hx of ventricular tachycardia, Cardiorenal syndrome without renal failure, Stage 3 chronic kidney disease, unspecified whether stage 3a or 3b CKD (H), Hypophosphatemia, History of confusion, Episode of recurrent major depressive disorder, unspecified depression episode severity (H), Anemia, unspecified type, Chronic obstructive pulmonary disease, unspecified COPD type (H), Hx of gout, Slow transit constipation, 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    Today-Very pleasant man. Met with patient who denies any chest pain, palpitations, shortness of breath, LEWIS, lightheadedness, dizziness, or cough. Denies any abdominal discomfort. Denies N&V. Denies B&B concerns. Denies dysuria or frequency. Denies loose or constipation. Appetite good, but does not like the thickened liquids per history. Speech and Language therapy involved with hopes to advance diet as appropriate which patient would really like. Sleeping well. Complaints of some mild pain to neck region today but tolerable at this time. I did advise to wean off oxycodone as appropriate and to maximum other agents as necessary which he is in agreement with. Lives with wife at home- she is planning to bring in how own compression stockings to wear on site. He did cancel his upcoming appointments with optometry which was scheduled for 4/19, however he also cancelled his neurosurgery follow up  which was scheduled for 4/21/23. I advised him to re-schedule this appointment within 1 month for follow up needs regarding recent neck surgery. Area to front cervical area appears stable. Mild swelling present but no signs of infection.    BP Readings from Last 3 Encounters:   04/12/23 113/62   11/20/19 121/81   11/18/19 120/74     Wt Readings from Last 5 Encounters:   04/12/23 97.2 kg (214 lb 3.2 oz)   11/20/19 96.6 kg (213 lb)   11/18/19 97.1 kg (214 lb)   11/13/19 97.1 kg (214 lb)   11/10/19 97.1 kg (214 lb)     CODE STATUS/ADVANCE DIRECTIVES DISCUSSION:  Full Code  CPR/Full code   ALLERGIES:   Allergies   Allergen Reactions     Atorvastatin Other (See Comments)     Muscle pain  muscle pain       Atenolol      Cough       Lisinopril Cough      PAST MEDICAL HISTORY:   Past Medical History:   Diagnosis Date     ACQ ESOPHAG DIVERTICULUM 5/12/2006     Depressive disorder, not elsewhere classified      Displacement of lumbar intervertebral disc without myelopathy      ESOPHAGEAL REFLUX 5/12/2006    Based on esophagram 5/12/06     Essential hypertension, benign 2/10/2006     Obesity, unspecified      Other and unspecified hyperlipidemia 2/10/2006     Unspecified sleep apnea     Has not done well w/ CPAP      PAST SURGICAL HISTORY:   has a past surgical history that includes REPAIR CRUCIATE LIGAMENT,KNEE; surgical history of - ; Cardiac catheterization (08/06/2003); other surgical history (09/2010); other surgical history (1990s); other surgical history; other surgical history (Left, 11/2008); other surgical history (09/2010); Cholecystectomy (08/14/2011); other surgical history; other surgical history (Right, 03/04/2011); Spinal Fusion (09/11/2019); and other surgical history (10/29/2019).  FAMILY HISTORY: family history includes Alcoholism in his father; Cancer in his father; Cataracts in his father and mother; Coronary Artery Disease in his brother and mother; Diabetes in his father; Glaucoma in his sister; Heart  Disease in his mother; Hypertension in his brother and mother; Obesity in his sister; Other - See Comments in his brother, father, and sister.  SOCIAL HISTORY:   reports that he has never smoked. He has never used smokeless tobacco. He reports current alcohol use of about 2.0 standard drinks of alcohol per week. He reports that he does not use drugs.  Patient's living condition: lives with spouse    Post Discharge Medication Reconciliation Status:   MED REC REQUIRED  Post Medication Reconciliation Status:  Discharge medications reconciled and changed, see notes/orders         Current Outpatient Medications   Medication Sig     acetaminophen (TYLENOL) 500 MG tablet Take 2 tablets (1,000 mg) by mouth every 8 hours as needed for mild pain     albuterol (PROAIR HFA/PROVENTIL HFA/VENTOLIN HFA) 108 (90 Base) MCG/ACT inhaler Inhale 2 puffs into the lungs every 4 hours as needed for shortness of breath or wheezing     allopurinol (ZYLOPRIM) 300 MG tablet Take 1 tablet (300 mg) by mouth daily     aspirin (ASA) 81 MG chewable tablet Take 1 tablet (81 mg) by mouth daily     atorvastatin (LIPITOR) 40 MG tablet Take 1 tablet (40 mg) by mouth daily     bisacodyl (DULCOLAX) 10 MG suppository Place 1 suppository (10 mg) rectally daily as needed for constipation     buPROPion (WELLBUTRIN XL) 150 MG 24 hr tablet Take 1 tablet (150 mg) by mouth every morning     doxycycline hyclate (VIBRAMYCIN) 100 MG capsule Take 1 capsule (100 mg) by mouth 2 times daily     ferrous sulfate (FEROSUL) 325 (65 Fe) MG tablet Take 1 tablet (325 mg) by mouth three times a week     FLUoxetine (PROZAC) 40 MG capsule Take 1 capsule (40 mg) by mouth daily     Fluticasone-Umeclidin-Vilanterol (TRELEGY ELLIPTA) 200-62.5-25 MCG/ACT oral inhaler Inhale 1 puff into the lungs daily     folic acid (FOLVITE) 1 MG tablet Take 2 tablets (2 mg) by mouth daily     gabapentin (NEURONTIN) 100 MG capsule Take 2 capsules (200 mg) by mouth 2 times daily     K-Phos-Neutral  "[id number] MG tablet Take 2 tablets by mouth 2 times daily (with meals) for 6 doses     Lidocaine (LIDOCARE) 4 % Patch Place 1 patch onto the skin every 24 hours To prevent lidocaine toxicity, patient should be patch free for 12 hrs daily.     methocarbamol (ROBAXIN) 500 MG tablet Take 1 tablet (500 mg) by mouth 3 times daily as needed for muscle spasms     metoprolol succinate ER (TOPROL XL) 50 MG 24 hr tablet Take 1 tablet (50 mg) by mouth daily     nystatin (MYCOSTATIN) 499398 UNIT/GM external powder Apply topically 2 times daily as needed     omeprazole (PRILOSEC) 20 MG DR capsule Take 1 capsule (20 mg) by mouth daily     oxyCODONE (ROXICODONE) 5 MG tablet Take 0.5 tablets (2.5 mg) by mouth every 6 hours as needed for pain     polyethylene glycol (MIRALAX) 17 GM/Dose powder Take 17 g (1 capful.) by mouth daily as needed for constipation     potassium chloride ER (KLOR-CON M) 20 MEQ CR tablet Take 1 tablet (20 mEq) by mouth daily     senna-docusate (SENOKOT-S/PERICOLACE) 8.6-50 MG tablet Take 2 tablets by mouth 2 times daily as needed for constipation     sucralfate (CARAFATE) 1 GM tablet Take 1 tablet (1 g) by mouth At Bedtime     traZODone (DESYREL) 100 MG tablet Take 1 tablet (100 mg) by mouth At Bedtime     vitamin D3 (CHOLECALCIFEROL) 50 mcg (2000 units) tablet Take 1 tablet (50 mcg) by mouth daily     warfarin ANTICOAGULANT (COUMADIN) 5 MG tablet Take 1 tablet (5 mg) by mouth daily     No current facility-administered medications for this visit.       ROS:  10 point ROS of systems including Constitutional, Eyes, Respiratory, Cardiovascular, Gastroenterology, Genitourinary, Integumentary, Musculoskeletal, Psychiatric were all negative except for pertinent positives noted in my HPI.    Vitals:  /62   Pulse 61   Temp 96.8  F (36  C)   Resp 16   Ht 1.702 m (5' 7\")   Wt 97.2 kg (214 lb 3.2 oz)   SpO2 94%   BMI 33.55 kg/m    Exam:  GENERAL APPEARANCE:  Alert, in no distress, oriented  ENT:  Mouth " and posterior oropharynx normal, moist mucous membranes, normal hearing acuity  EYES:  EOM, conjunctivae, lids, pupils and irises normal  NECK:  No adenopathy,masses or thyromegaly  RESP:  respiratory effort and palpation of chest normal, lungs clear to auscultation , no respiratory distress  CV:  Palpation and auscultation of heart done , regular rate and rhythm, no murmur, rub, or gallop, peripheral edema 1-2+ in BLE  ABDOMEN:  normal bowel sounds, soft, nontender, no hepatosplenomegaly or other masses, no guarding or rebound  M/S:   Ambulates with walker  SKIN:  Inspection of skin and subcutaneous tissue baseline, Palpation of skin and subcutaneous tissue baseline, wound healing well, no signs of infection no drainage. mild swelling.   NEURO:   Cranial nerves 2-12 are normal tested and grossly at patient's baseline, no purposeful movement in upper and lower extremities  PSYCH:  oriented X 3, normal insight, judgement and memory, affect and mood normal    Lab/Diagnostic data:  Labs done in SNF are in [facility]. Please refer to them using [facility] Everywhere.   Recent BMP on 4/11/23:  sodium-146  Potassium 4.2  Chloride 102  Calcium 10.7  BUN 40  Creatinine 1.56  GFR 47    Magnesium- 1.7  Phosphorus- 1.7  INR- 1.2    CBC noted on 4/8/23:  WBC 8.8  RBC 3.37  hgb 9.1  Platelet 160    Last Comprehensive Metabolic Panel:  Lab Results   Component Value Date     04/12/2023    POTASSIUM 3.2 (L) 04/12/2023    CHLORIDE 102 04/12/2023    CO2 29 04/12/2023    ANIONGAP 14 04/12/2023    GLC 86 04/12/2023    BUN 34.0 (H) 04/12/2023    CR 1.58 (H) 04/12/2023    GFRESTIMATED 46 (L) 04/12/2023    TAWNYA 10.3 (H) 04/12/2023       Lab Results   Component Value Date    WBC 6.4 04/12/2023    WBC 7.3 11/08/2006     Lab Results   Component Value Date    RBC 3.62 04/12/2023    RBC 4.85 11/08/2006     Lab Results   Component Value Date    HGB 9.7 04/12/2023    HGB 14.5 11/08/2006     Lab Results   Component Value Date    HCT 34.0  04/12/2023    HCT 42.0 11/08/2006     No components found for: MCT  Lab Results   Component Value Date    MCV 94 04/12/2023    MCV 87 11/08/2006     Lab Results   Component Value Date    MCH 26.8 04/12/2023    MCH 29.9 11/08/2006     Lab Results   Component Value Date    MCHC 28.5 04/12/2023    MCHC 34.5 11/08/2006     Lab Results   Component Value Date    RDW 18.4 04/12/2023    RDW 16.1 11/08/2006     Lab Results   Component Value Date     04/12/2023     11/08/2006         ASSESSMENT/PLAN:    (R53.81) Physical deconditioning  (primary encounter diagnosis)  (M62.81) Generalized muscle weakness  Comment: Acute on chronic. 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 presented to [facility] ED on 4/2/23 after a ground level fall. Patient reports that in the morning of 4/2 he was walking through the house when he lost his balance and fell backward. Reports landing on his back and striking his head, causing his neck to hyperflex. He was told he had LOC by the EMS crew and was unable to get up on his own. He noted pain to his head, neck and rest of his back. No nausea, vomiting, blurred/loss of vision, new focal weakness, numbness or tingling. No chest pain, abdominal pain. In ED, he described pain to the back of his head, neck and throughout his back. He also had hip pain which he attributed to laying on the wood floor after his fall. He noted several falls in recent past, including one 3 weeks ago in a restaurant. He last took his warfarin morning of 4/2. He has an appointment with neurology for  "evaluation of \"weakness in all 4 extremities, jerking movements/tremors worsening, more frequent falls\".  MRI under anesthesia completed 4/5 which showed traumatic injury of C5-6 disc space with asymmetric widening anteriorly and disruption of the anterior longitudinal ligament. He was thus taken to the OR on 4/7/23 for the following procedure: -- ACDF C5-C6 with Dr. Mera ([facility] [place]  Plan:   -Follow up with neurosurgery as directed. Originally Scheduled for 4/21/23, however patient cancelled this as he thought he did not need this appt. Recommended he reschedules this within 1 month  -Follow up with neurology as directed. Scheduled for 9/18/23  -Follow up with infectious disease as directed in 6 months. Due Sept 2023.   -Continue doxycycline 100mg BID as directed which is chronic with no stop date.   -Continue Tylenol 1000mg TID PRN  -Aspercreme 4% patch daily  -Robaxin 500mg TID PRN  -Oxycodone 2.5mg every 6 hours PRN x 2 week only then stop. Advise and recommend to limit due to history of confusion with use.   -Continue vitamin D daily  -Continue gabapentin 200mg BID for now.   -No driving until cleared by neurosurgery  -No lifting greater than 10 pounds. Wear your hard cervical collar only when out of bed. No need to wear c-collar in bed, ok to take off with meals and showers if sitting in shower chair. Avoid excessive twisting or bending of your neck. Avoid repetitive overhead reaching, pulling, and pushing motions. Use a supportive pillow when sleeping. No driving until seen in the Neurosurgery Clinic. Call the Neurosurgery clinic to discuss return to work.  -Monitor incision daily for signs of infection, which include redness, drainage, swelling, tenderness and warmth to the touch. Keep incision dry, pat dry after shower. Dermabond (skin glue) was used -- Do NOT pick or peel off Dermabond. No need to cover incision while showering, but do not scrub or apply lotions. Do not soak in tub or pool until incision is " completely healed in 30 days. Leave incision open to air, unless it is rubbing on collar and then may cover with dressing.  -BMP pending for today 4/12/23--results pending  -BMP, CBC and INR due Friday 4/14/23    (M05.9) Seropositive rheumatoid arthritis (H)  Comment: Acute on chronic. Followed by rheumatology with [facility] with last visit 3/21/23. Presented typically with severe polyarthralgias (hands, shoulders, knees, ankles ) for 3-4 months, in the [facility]. He actually developed a gluteal sore from sitting so much d/t joint pain. Significant am stiffness, difficulty making a fist. The knees are the most painful. He received cortisone shots which improved symptoms intitially, but stopped working. He also tried synvisc in both knees x1 which did not help at all. W/ trial of prednisone. ESR 61-->55, CRP 2.7--->2.0 mg/dL, this helped but temporarily. Not that much. Not miracle cure. They changed him to ARAVA/ RTX combo in 9/2019, but the 2nd infuson was delayed due to numerous medical complications.  Plan:  -Follow up with rheumatology as directed. Scheduled for 7/10/23  -Monitor pain complaints  -Allopurinol 300mg daily  -BMP pending for today 4/12/23--results pending  -BMP, CBC and INR due Friday 4/14/23    (R13.10) Dysphagia, unspecified type  Comment: Acute. On POD#2 concern for some aspiration with thickened liquid (appeared to be mixed too thin per RN staff). Patient also complains of globus sensation when he swallows, feels like he is swallowing pills whenever he swallows any liquid. SLP reconsulted, recommend continuing with same IDDSI 7a diet with level 2 mildly thick liquids. Globus sensation should improve over next several days as postop swelling improves.  Plan:   -Continue Speech and Language therapy as directed to advance diet as tolerated if able  -Continue regular easy to chew diet along with NTL. Must be upright in chair for all oral intake  -BMP pending for today 4/12/23--results pending  -BMP, CBC and INR  due Friday 4/14/23    (I10) Essential hypertension, benign  (E78.5) Hyperlipidemia LDL goal <130  (I50.30) Heart failure with preserved ejection fraction, NYHA class I (H)  (I25.10) Coronary artery disease involving native coronary artery of native heart without angina pectoris  Comment: Chronic. Followed by  cardiology with last visit 3/20/23. Past ECHO 10/22 revealed The estimated ejection fraction is 55-60%. Wall thickness is mildly increased. Left ventricular segmental wall motion is normal.  Plan:   -Follow up with cardiology as directed. Scheduled for 5/16/23 with provider and 5/9/23 with labs.   -Continue asa and statin as directed  -Start potassium chloride 20mEq daily due to hypokalemia risks.   -Continue metoprolol succinate 50mg daily  -BMP pending for today 4/12/23--results pending  -BMP, CBC and INR due Friday 4/14/23    (I48.0) Paroxysmal atrial fibrillation (H)  (Z79.01) Chronic anticoagulation  (Z86.79) Hx of ventricular tachycardia  Comment: Acute on chronic. On 4/10, overnight V tach with patient asymptomatic, then back to A fib. Patient doing well this AM. Na at 151 today,will need to improve before D/C. Treated with 500ml IV fluid bolus and given potassium x2. Negative for bilateral lower extremity venous doppler.  Neurosurgery cleared to restart warfarin on 4/11. INR today 4/12-1.2  Plan:   -Continue metoprolol succinate 50mg daily  -Coumadin 5mg daily  -Monitor BP and HR  -Monitor for worsening s/sx of concerns  -BMP pending for today 4/12/23--results pending  -BMP, CBC and INR due Friday 4/14/23    (I13.10) Cardiorenal syndrome without renal failure  (N18.30) Stage 3 chronic kidney disease, unspecified whether stage 3a or 3b CKD (H)  Comment: Chronic. Followed by  nephrology with last visit 3/29/23. Documentation of kidney function was at 31%. Nonoliguric SUDEEP on CKD, likely in the setting of over-diuresis: Previously (since the right heart catheterization of 02/17/2023) on torsemide 60 mg  b.i.d. and metolazone 2.5 mg q.d. prn which he had been taking more frequently. Both diuretics held this hospitalization, BUN and creatinine have progressively improved.   Plan:   -Monitor urinary status  -DAily use of compression stockings from home.   -Monitor weights daily. Ideal weights around 205#. Weight in hospital on 4/10 was documented 206#  -Limit 2gm sodium diet.   -Follow up with nephrology as directed within 4 months. Due July 2023.   -BMP pending for today 4/12/23--results pending  -BMP, CBC and INR due Friday 4/14/23    (E83.39) Hypophosphatemia  Comment: Acute. On 4/11 phosphorous 1.7 and replaced orally. Na down to 146. Medicine team agreed ok to discharge and no bridge needed to restart warfarin.   Plan:   -Follow up with nephrology as directed  -Continue K phos BID x 6 doses per hospital orders.   -BMP pending for today 4/12/23--results pending  -BMP, CBC and INR due Friday 4/14/23    (Z87.898) History of confusion  Comment: Acute. On 4/6 In the afternoon, patient became more somnolent following administration of oxycodone. Narcan administered, 0.2mg x2 doses. STAT labs ordered and are largely unrevealing, suspect polypharmacy and possible delayed anesthesia reaction for new somnolence. Discussed new increased somnolence with Nisha from NSGY team. Continue to monitor closely. Some confusion on the morning of POD#1, though improved by afternoon.  Plan:   -Monitor for worsening s/sx of concerns  -Monitor mood and behaviors  -Monitor for changes in mobility, eating and sleeping patterns  -BMP pending for today 4/12/23--results pending  -BMP, CBC and INR due Friday 4/14/23    (F33.9) Episode of recurrent major depressive disorder, unspecified depression episode severity (H)  Comment: Chronic. Stable  Plan:   -Monitor mood and behaviors  -Monitor for changes in mobility, eating and sleeping patterns  -Continue bupropion 150mg XL daily  -Continue prozac 40mg daily  -Continue trazodone 100mg daily at  HS  -BMP pending for today 4/12/23--results pending  -BMP, CBC and INR due Friday 4/14/23    (D64.9) Anemia, unspecified type  Comment: Acute on chronic. Baseline hgb~9s  Plan:   -Monitor bleeding risks  -Monitor for worsening s/sx of concerns  -Continue ferrous sulfate 325mg 3x weekly  -Continue folic acid 2mg daily  -BMP pending for today 4/12/23--results pending  -BMP, CBC and INR due Friday 4/14/23    (J44.9) Chronic obstructive pulmonary disease, unspecified COPD type (H)  (J96.12) Chronic respiratory failure with hypercapnia  Comment: Chronic. Followed by  pulmonology with last visit 2/21/23  Plan:   -Monitor respiratory status  -Continue albuterol inhaler PRN  -Continue trelegy ellipta daily  -BMP pending for today 4/12/23--results pending  -BMP, CBC and INR due Friday 4/14/23  -Follow up with pulmonology as directed post TCU    (Z87.39) Hx of gout  Comment: Chronic. Stable.   Plan:  -Monitor gout flares  -Continue allopurinol 300mg daily  -BMP pending for today 4/12/23--results pending  -BMP, CBC and INR due Friday 4/14/23    (K59.01) Slow transit constipation  Comment: Acute on chronic. S/T polypharmacy  Plan:   -Monitor BM patterns  -Miralax daily PRN  -Bisacodyl daily PRN if no BM in 72 hours  -Senna S 2 tabs BID PRN    (E11.22) Type 2 diabetes mellitus with chronic kidney disease, without long-term current use of insulin, unspecified CKD stage (H)  Comment: Acute on chronic. Not on any insulin or oral agents. A1C noted per chart review 3/1/23 was 6.1%. Goal <8%.   Plan:   -Monitor for worsening s/sx of concerns  -Follow up with primary PCP post TCU  -BMP pending for today 4/12/23--results pending  -BMP, CBC and INR due Friday 4/14/23    (E21.3) Hyperparathyroidism, unspecified (H)  (E83.52)  Chronic intermittent hypercalcemia.   Comment: Acute on chronic. Followed by  endocrinology. Chronic intermittent hypercalcemia. 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 Plan per last outpatient nephrology note (Dr Harding) in 09/2022 is to start cinacalcet if hypercalcemia persists. Has been receiving D5W 500cc over 5 hours x 1 noted on 4/10/23  Plan:   -Monitor for worsening s/sx of concerns  -Follow up with endocrinology as directed. Scheduled for 8/30/23  -BMP pending for today 4/12/23--results pending  -BMP, CBC and INR due Friday 4/14/23    (E87.0) Hypernatremia  Comment: Acute. In the setting of poor free water intake. Patient does not like thickened liquids---likely reasons for new hypernatremia since thickened liquids started. D5W given over 5 hours on 4/10  Plan:  -Monitor for worsening s/sx of concerns  -Follow up with nephrology as directed  -Encourage oral intake  -BMP pending for today 4/12/23--results pending  -BMP, CBC and INR due Friday 4/14/23      Electronically signed by:  Rajani Elkins DNP, APRN

## 2023-04-12 ENCOUNTER — TRANSITIONAL CARE UNIT VISIT (OUTPATIENT)
Dept: GERIATRICS | Facility: CLINIC | Age: 73
End: 2023-04-12
Payer: MEDICARE

## 2023-04-12 VITALS
BODY MASS INDEX: 33.62 KG/M2 | RESPIRATION RATE: 16 BRPM | HEART RATE: 61 BPM | DIASTOLIC BLOOD PRESSURE: 62 MMHG | TEMPERATURE: 96.8 F | OXYGEN SATURATION: 94 % | HEIGHT: 67 IN | SYSTOLIC BLOOD PRESSURE: 113 MMHG | WEIGHT: 214.2 LBS

## 2023-04-12 DIAGNOSIS — I48.0 PAROXYSMAL ATRIAL FIBRILLATION (H): ICD-10-CM

## 2023-04-12 DIAGNOSIS — I13.10: ICD-10-CM

## 2023-04-12 DIAGNOSIS — I10 ESSENTIAL HYPERTENSION, BENIGN: ICD-10-CM

## 2023-04-12 DIAGNOSIS — N18.30 STAGE 3 CHRONIC KIDNEY DISEASE, UNSPECIFIED WHETHER STAGE 3A OR 3B CKD (H): ICD-10-CM

## 2023-04-12 DIAGNOSIS — E87.0 HYPERNATREMIA: ICD-10-CM

## 2023-04-12 DIAGNOSIS — I50.30 HEART FAILURE WITH PRESERVED EJECTION FRACTION, NYHA CLASS I (H): ICD-10-CM

## 2023-04-12 DIAGNOSIS — R29.6 FALLS FREQUENTLY: ICD-10-CM

## 2023-04-12 DIAGNOSIS — Z87.898 HISTORY OF CONFUSION: ICD-10-CM

## 2023-04-12 DIAGNOSIS — M46.26 INFECTION OF LUMBAR SPINE (H): ICD-10-CM

## 2023-04-12 DIAGNOSIS — K59.01 SLOW TRANSIT CONSTIPATION: ICD-10-CM

## 2023-04-12 DIAGNOSIS — J44.9 CHRONIC OBSTRUCTIVE PULMONARY DISEASE, UNSPECIFIED COPD TYPE (H): ICD-10-CM

## 2023-04-12 DIAGNOSIS — E83.39 HYPOPHOSPHATEMIA: ICD-10-CM

## 2023-04-12 DIAGNOSIS — E21.3 HYPERPARATHYROIDISM, UNSPECIFIED (H): ICD-10-CM

## 2023-04-12 DIAGNOSIS — Z79.01 CHRONIC ANTICOAGULATION: ICD-10-CM

## 2023-04-12 DIAGNOSIS — Z87.39 HX OF GOUT: ICD-10-CM

## 2023-04-12 DIAGNOSIS — J96.12 CHRONIC RESPIRATORY FAILURE WITH HYPERCAPNIA (H): ICD-10-CM

## 2023-04-12 DIAGNOSIS — F33.9 EPISODE OF RECURRENT MAJOR DEPRESSIVE DISORDER, UNSPECIFIED DEPRESSION EPISODE SEVERITY (H): ICD-10-CM

## 2023-04-12 DIAGNOSIS — E11.22 TYPE 2 DIABETES MELLITUS WITH CHRONIC KIDNEY DISEASE, WITHOUT LONG-TERM CURRENT USE OF INSULIN, UNSPECIFIED CKD STAGE (H): ICD-10-CM

## 2023-04-12 DIAGNOSIS — R53.81 PHYSICAL DECONDITIONING: Primary | ICD-10-CM

## 2023-04-12 DIAGNOSIS — R52 PAIN: ICD-10-CM

## 2023-04-12 DIAGNOSIS — E78.5 HYPERLIPIDEMIA LDL GOAL <130: ICD-10-CM

## 2023-04-12 DIAGNOSIS — Z98.890 H/O CERVICAL SPINE SURGERY: ICD-10-CM

## 2023-04-12 DIAGNOSIS — D84.821 IMMUNOSUPPRESSION DUE TO DRUG THERAPY (H): ICD-10-CM

## 2023-04-12 DIAGNOSIS — Z86.79 HX OF VENTRICULAR TACHYCARDIA: ICD-10-CM

## 2023-04-12 DIAGNOSIS — D64.9 ANEMIA, UNSPECIFIED TYPE: ICD-10-CM

## 2023-04-12 DIAGNOSIS — S13.4XXD INJURY TO LIGAMENT OF CERVICAL SPINE, SUBSEQUENT ENCOUNTER: ICD-10-CM

## 2023-04-12 DIAGNOSIS — M62.81 GENERALIZED MUSCLE WEAKNESS: ICD-10-CM

## 2023-04-12 DIAGNOSIS — I25.10 CORONARY ARTERY DISEASE INVOLVING NATIVE CORONARY ARTERY OF NATIVE HEART WITHOUT ANGINA PECTORIS: ICD-10-CM

## 2023-04-12 DIAGNOSIS — M05.9 SEROPOSITIVE RHEUMATOID ARTHRITIS (H): ICD-10-CM

## 2023-04-12 DIAGNOSIS — Z79.899 IMMUNOSUPPRESSION DUE TO DRUG THERAPY (H): ICD-10-CM

## 2023-04-12 DIAGNOSIS — R13.10 DYSPHAGIA, UNSPECIFIED TYPE: ICD-10-CM

## 2023-04-12 DIAGNOSIS — E83.52 HYPERCALCEMIA: ICD-10-CM

## 2023-04-12 LAB
ANION GAP SERPL CALCULATED.3IONS-SCNC: 14 MMOL/L (ref 7–15)
BUN SERPL-MCNC: 34 MG/DL (ref 8–23)
CALCIUM SERPL-MCNC: 10.3 MG/DL (ref 8.8–10.2)
CHLORIDE SERPL-SCNC: 102 MMOL/L (ref 98–107)
CREAT SERPL-MCNC: 1.58 MG/DL (ref 0.67–1.17)
DEPRECATED HCO3 PLAS-SCNC: 29 MMOL/L (ref 22–29)
ERYTHROCYTE [DISTWIDTH] IN BLOOD BY AUTOMATED COUNT: 18.4 % (ref 10–15)
GFR SERPL CREATININE-BSD FRML MDRD: 46 ML/MIN/1.73M2
GLUCOSE SERPL-MCNC: 86 MG/DL (ref 70–99)
HCT VFR BLD AUTO: 34 % (ref 40–53)
HGB BLD-MCNC: 9.7 G/DL (ref 13.3–17.7)
MCH RBC QN AUTO: 26.8 PG (ref 26.5–33)
MCHC RBC AUTO-ENTMCNC: 28.5 G/DL (ref 31.5–36.5)
MCV RBC AUTO: 94 FL (ref 78–100)
PLATELET # BLD AUTO: 197 10E3/UL (ref 150–450)
POTASSIUM SERPL-SCNC: 3.2 MMOL/L (ref 3.4–5.3)
RBC # BLD AUTO: 3.62 10E6/UL (ref 4.4–5.9)
SODIUM SERPL-SCNC: 145 MMOL/L (ref 136–145)
WBC # BLD AUTO: 6.4 10E3/UL (ref 4–11)

## 2023-04-12 PROCEDURE — 99310 SBSQ NF CARE HIGH MDM 45: CPT | Performed by: NURSE PRACTITIONER

## 2023-04-12 PROCEDURE — P9604 ONE-WAY ALLOW PRORATED TRIP: HCPCS | Mod: ORL | Performed by: FAMILY MEDICINE

## 2023-04-12 PROCEDURE — 36415 COLL VENOUS BLD VENIPUNCTURE: CPT | Mod: ORL | Performed by: FAMILY MEDICINE

## 2023-04-12 PROCEDURE — 85027 COMPLETE CBC AUTOMATED: CPT | Mod: ORL | Performed by: FAMILY MEDICINE

## 2023-04-12 PROCEDURE — 80048 BASIC METABOLIC PNL TOTAL CA: CPT | Mod: ORL | Performed by: FAMILY MEDICINE

## 2023-04-12 RX ORDER — METHOCARBAMOL 500 MG/1
500 TABLET, FILM COATED ORAL 3 TIMES DAILY PRN
Start: 2023-04-12 | End: 2023-04-21

## 2023-04-12 RX ORDER — SUCRALFATE 1 G/1
1 TABLET ORAL AT BEDTIME
COMMUNITY
Start: 2023-03-09 | End: 2023-04-12 | Stop reason: ALTCHOICE

## 2023-04-12 RX ORDER — BUPROPION HYDROCHLORIDE 150 MG/1
150 TABLET ORAL EVERY MORNING
Start: 2023-04-12

## 2023-04-12 RX ORDER — FLUOXETINE 40 MG/1
40 CAPSULE ORAL DAILY
Start: 2023-04-12

## 2023-04-12 RX ORDER — ACETAMINOPHEN 500 MG
1000 TABLET ORAL EVERY 8 HOURS PRN
Start: 2023-04-12

## 2023-04-12 RX ORDER — LIDOCAINE 4 G/G
1 PATCH TOPICAL EVERY 24 HOURS
Start: 2023-04-12 | End: 2023-04-21

## 2023-04-12 RX ORDER — SUCRALFATE 1 G/1
1 TABLET ORAL AT BEDTIME
Start: 2023-04-12

## 2023-04-12 RX ORDER — OXYCODONE HYDROCHLORIDE 5 MG/1
2.5 TABLET ORAL EVERY 6 HOURS PRN
Qty: 28 TABLET | Refills: 0 | Status: SHIPPED | OUTPATIENT
Start: 2023-04-12 | End: 2023-04-26

## 2023-04-12 RX ORDER — TRAZODONE HYDROCHLORIDE 100 MG/1
100 TABLET ORAL AT BEDTIME
Start: 2023-04-12

## 2023-04-12 RX ORDER — ALBUTEROL SULFATE 90 UG/1
2 AEROSOL, METERED RESPIRATORY (INHALATION) EVERY 4 HOURS PRN
Qty: 18 G | Refills: 0
Start: 2023-04-12

## 2023-04-12 RX ORDER — AMOXICILLIN 250 MG
2 CAPSULE ORAL 2 TIMES DAILY PRN
Start: 2023-04-12

## 2023-04-12 RX ORDER — METOPROLOL SUCCINATE 50 MG/1
50 TABLET, EXTENDED RELEASE ORAL DAILY
Start: 2023-04-12

## 2023-04-12 RX ORDER — BISACODYL 10 MG
10 SUPPOSITORY, RECTAL RECTAL DAILY PRN
Start: 2023-04-12

## 2023-04-12 RX ORDER — WARFARIN SODIUM 5 MG/1
5 TABLET ORAL DAILY
Start: 2023-04-12

## 2023-04-12 RX ORDER — ALLOPURINOL 300 MG/1
300 TABLET ORAL DAILY
Start: 2023-04-12

## 2023-04-12 RX ORDER — ASPIRIN 81 MG/1
81 TABLET, CHEWABLE ORAL DAILY
Start: 2023-04-12

## 2023-04-12 RX ORDER — NYSTATIN 100000 [USP'U]/G
POWDER TOPICAL 2 TIMES DAILY PRN
Start: 2023-04-12

## 2023-04-12 RX ORDER — CHOLECALCIFEROL (VITAMIN D3) 50 MCG
1 TABLET ORAL DAILY
Start: 2023-04-12

## 2023-04-12 RX ORDER — FERROUS SULFATE 325(65) MG
325 TABLET ORAL
Start: 2023-04-12

## 2023-04-12 RX ORDER — GABAPENTIN 100 MG/1
200 CAPSULE ORAL 2 TIMES DAILY
Start: 2023-04-12 | End: 2023-04-21

## 2023-04-12 RX ORDER — FLUTICASONE FUROATE, UMECLIDINIUM BROMIDE AND VILANTEROL TRIFENATATE 200; 62.5; 25 UG/1; UG/1; UG/1
1 POWDER RESPIRATORY (INHALATION) DAILY
Start: 2023-04-12

## 2023-04-12 RX ORDER — FOLIC ACID 1 MG/1
2 TABLET ORAL DAILY
Start: 2023-04-12

## 2023-04-12 RX ORDER — ATORVASTATIN CALCIUM 40 MG/1
40 TABLET, FILM COATED ORAL DAILY
Start: 2023-04-12

## 2023-04-12 RX ORDER — FERROUS SULFATE 325(65) MG
325 TABLET ORAL
COMMUNITY
Start: 2022-11-14 | End: 2023-04-12 | Stop reason: ALTCHOICE

## 2023-04-12 RX ORDER — SODIUM PHOSPHATE, DIBASIC, ANHYDROUS, POTASSIUM PHOSPHATE, MONOBASIC, AND SODIUM PHOSPHATE, MONOBASIC, MONOHYDRATE 852; 155; 130 MG/1; MG/1; MG/1
2 TABLET, COATED ORAL 2 TIMES DAILY WITH MEALS
Qty: 12 TABLET | Refills: 0
Start: 2023-04-12 | End: 2023-04-15

## 2023-04-12 RX ORDER — DOXYCYCLINE 100 MG/1
100 CAPSULE ORAL 2 TIMES DAILY
Start: 2023-04-12

## 2023-04-12 RX ORDER — POTASSIUM CHLORIDE 1500 MG/1
20 TABLET, EXTENDED RELEASE ORAL DAILY
Qty: 30 TABLET | Refills: 0 | Status: SHIPPED | OUTPATIENT
Start: 2023-04-12

## 2023-04-12 RX ORDER — POLYETHYLENE GLYCOL 3350 17 G/17G
1 POWDER, FOR SOLUTION ORAL DAILY PRN
Start: 2023-04-12

## 2023-04-12 NOTE — LETTER
"    4/12/2023        RE: Noe Mason  [address]  [facility] [place] [address]        [facility] GERIATRICS    PRIMARY CARE PROVIDER AND CLINIC:  Dg Anthony MD, [address] [facility]  / [place] [address]  Chief Complaint   Patient presents with     Hospital F/U      [place] Medical Record Number:  [id number]  Place of Service where encounter took place:  [facility] ([facility]) [[id number]]    Noe Mason  is a 73 year old  (1950), admitted to the above facility from  [facility] . Hospital stay 4/2/23 through 4/11/23..   HPI:   72 y.o. male with PMHx significant for HTN, HFpEF, CAD s/p PCI, afib on Warfarin, CKD, DMII who presented to [facility] ED on 4/2/23 after a ground level fall. Patient reports that in the morning of 4/2 he was walking through the house when he lost his balance and fell backward. Reports landing on his back and striking his head, causing his neck to hyperflex. He was told he had LOC by the EMS crew and was unable to get up on his own. He noted pain to his head, neck and rest of his back. No nausea, vomiting, blurred/loss of vision, new focal weakness, numbness or tingling. No chest pain, abdominal pain. In ED, he described pain to the back of his head, neck and throughout his back. He also had hip pain which he attributed to laying on the wood floor after his fall. He noted several falls in recent past, including one 3 weeks ago in a restaurant. He last took his warfarin morning of 4/2. He has an appointment with neurology for evaluation of \"weakness in all 4 extremities, jerking movements/tremors worsening, more frequent falls\". ED work up was significant for the following injuries: -- widening of C5-6 disc space with prevertebral soft tissue edema    The primary encounter diagnosis was Physical deconditioning. Diagnoses of Generalized muscle weakness, Falls frequently, Injury to ligament of cervical spine, subsequent encounter, H/O cervical spine " surgery, Pain, Dysphagia, unspecified type, Essential hypertension, benign, Hyperlipidemia LDL goal <130, Heart failure with preserved ejection fraction, NYHA class I (H), Coronary artery disease involving native coronary artery of native heart without angina pectoris, Paroxysmal atrial fibrillation (H), Chronic anticoagulation, Hx of ventricular tachycardia, Cardiorenal syndrome without renal failure, Stage 3 chronic kidney disease, unspecified whether stage 3a or 3b CKD (H), Hypophosphatemia, History of confusion, Episode of recurrent major depressive disorder, unspecified depression episode severity (H), Anemia, unspecified type, Chronic obstructive pulmonary disease, unspecified COPD type (H), Hx of gout, Slow transit constipation, 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    Today-Very pleasant man. Met with patient who denies any chest pain, palpitations, shortness of breath, LEWIS, lightheadedness, dizziness, or cough. Denies any abdominal discomfort. Denies N&V. Denies B&B concerns. Denies dysuria or frequency. Denies loose or constipation. Appetite good, but does not like the thickened liquids per history. Speech and Language therapy involved with hopes to advance diet as appropriate which patient would really like. Sleeping well. Complaints of some mild pain to neck region today but tolerable at this time. I did advise to wean off oxycodone as appropriate and to maximum other agents as necessary which he is in agreement with. Lives with wife at home- she is planning to bring in how own compression stockings to wear on site. He did cancel his upcoming appointments  with optometry which was scheduled for 4/19, however he also cancelled his neurosurgery follow up which was scheduled for 4/21/23. I advised him to re-schedule this appointment within 1 month for follow up needs regarding recent neck surgery. Area to front cervical area appears stable. Mild swelling present but no signs of infection.    BP Readings from Last 3 Encounters:   04/12/23 113/62   11/20/19 121/81   11/18/19 120/74     Wt Readings from Last 5 Encounters:   04/12/23 97.2 kg (214 lb 3.2 oz)   11/20/19 96.6 kg (213 lb)   11/18/19 97.1 kg (214 lb)   11/13/19 97.1 kg (214 lb)   11/10/19 97.1 kg (214 lb)     CODE STATUS/ADVANCE DIRECTIVES DISCUSSION:  Full Code  CPR/Full code   ALLERGIES:   Allergies   Allergen Reactions     Atorvastatin Other (See Comments)     Muscle pain  muscle pain       Atenolol      Cough       Lisinopril Cough      PAST MEDICAL HISTORY:   Past Medical History:   Diagnosis Date     ACQ ESOPHAG DIVERTICULUM 5/12/2006     Depressive disorder, not elsewhere classified      Displacement of lumbar intervertebral disc without myelopathy      ESOPHAGEAL REFLUX 5/12/2006    Based on esophagram 5/12/06     Essential hypertension, benign 2/10/2006     Obesity, unspecified      Other and unspecified hyperlipidemia 2/10/2006     Unspecified sleep apnea     Has not done well w/ CPAP      PAST SURGICAL HISTORY:   has a past surgical history that includes REPAIR CRUCIATE LIGAMENT,KNEE; surgical history of - ; Cardiac catheterization (08/06/2003); other surgical history (09/2010); other surgical history (1990s); other surgical history; other surgical history (Left, 11/2008); other surgical history (09/2010); Cholecystectomy (08/14/2011); other surgical history; other surgical history (Right, 03/04/2011); Spinal Fusion (09/11/2019); and other surgical history (10/29/2019).  FAMILY HISTORY: family history includes Alcoholism in his father; Cancer in his father; Cataracts in his father and mother; Coronary  Artery Disease in his brother and mother; Diabetes in his father; Glaucoma in his sister; Heart Disease in his mother; Hypertension in his brother and mother; Obesity in his sister; Other - See Comments in his brother, father, and sister.  SOCIAL HISTORY:   reports that he has never smoked. He has never used smokeless tobacco. He reports current alcohol use of about 2.0 standard drinks of alcohol per week. He reports that he does not use drugs.  Patient's living condition: lives with spouse    Post Discharge Medication Reconciliation Status:   MED REC REQUIRED  Post Medication Reconciliation Status:  Discharge medications reconciled and changed, see notes/orders         Current Outpatient Medications   Medication Sig     acetaminophen (TYLENOL) 500 MG tablet Take 2 tablets (1,000 mg) by mouth every 8 hours as needed for mild pain     albuterol (PROAIR HFA/PROVENTIL HFA/VENTOLIN HFA) 108 (90 Base) MCG/ACT inhaler Inhale 2 puffs into the lungs every 4 hours as needed for shortness of breath or wheezing     allopurinol (ZYLOPRIM) 300 MG tablet Take 1 tablet (300 mg) by mouth daily     aspirin (ASA) 81 MG chewable tablet Take 1 tablet (81 mg) by mouth daily     atorvastatin (LIPITOR) 40 MG tablet Take 1 tablet (40 mg) by mouth daily     bisacodyl (DULCOLAX) 10 MG suppository Place 1 suppository (10 mg) rectally daily as needed for constipation     buPROPion (WELLBUTRIN XL) 150 MG 24 hr tablet Take 1 tablet (150 mg) by mouth every morning     doxycycline hyclate (VIBRAMYCIN) 100 MG capsule Take 1 capsule (100 mg) by mouth 2 times daily     ferrous sulfate (FEROSUL) 325 (65 Fe) MG tablet Take 1 tablet (325 mg) by mouth three times a week     FLUoxetine (PROZAC) 40 MG capsule Take 1 capsule (40 mg) by mouth daily     Fluticasone-Umeclidin-Vilanterol (TRELEGY ELLIPTA) 200-62.5-25 MCG/ACT oral inhaler Inhale 1 puff into the lungs daily     folic acid (FOLVITE) 1 MG tablet Take 2 tablets (2 mg) by mouth daily     gabapentin  "(NEURONTIN) 100 MG capsule Take 2 capsules (200 mg) by mouth 2 times daily     K-Phos-Neutral 155-852-130 MG tablet Take 2 tablets by mouth 2 times daily (with meals) for 6 doses     Lidocaine (LIDOCARE) 4 % Patch Place 1 patch onto the skin every 24 hours To prevent lidocaine toxicity, patient should be patch free for 12 hrs daily.     methocarbamol (ROBAXIN) 500 MG tablet Take 1 tablet (500 mg) by mouth 3 times daily as needed for muscle spasms     metoprolol succinate ER (TOPROL XL) 50 MG 24 hr tablet Take 1 tablet (50 mg) by mouth daily     nystatin (MYCOSTATIN) 942904 UNIT/GM external powder Apply topically 2 times daily as needed     omeprazole (PRILOSEC) 20 MG DR capsule Take 1 capsule (20 mg) by mouth daily     oxyCODONE (ROXICODONE) 5 MG tablet Take 0.5 tablets (2.5 mg) by mouth every 6 hours as needed for pain     polyethylene glycol (MIRALAX) 17 GM/Dose powder Take 17 g (1 capful.) by mouth daily as needed for constipation     potassium chloride ER (KLOR-CON M) 20 MEQ CR tablet Take 1 tablet (20 mEq) by mouth daily     senna-docusate (SENOKOT-S/PERICOLACE) 8.6-50 MG tablet Take 2 tablets by mouth 2 times daily as needed for constipation     sucralfate (CARAFATE) 1 GM tablet Take 1 tablet (1 g) by mouth At Bedtime     traZODone (DESYREL) 100 MG tablet Take 1 tablet (100 mg) by mouth At Bedtime     vitamin D3 (CHOLECALCIFEROL) 50 mcg (2000 units) tablet Take 1 tablet (50 mcg) by mouth daily     warfarin ANTICOAGULANT (COUMADIN) 5 MG tablet Take 1 tablet (5 mg) by mouth daily     No current facility-administered medications for this visit.       ROS:  10 point ROS of systems including Constitutional, Eyes, Respiratory, Cardiovascular, Gastroenterology, Genitourinary, Integumentary, Musculoskeletal, Psychiatric were all negative except for pertinent positives noted in my HPI.    Vitals:  /62   Pulse 61   Temp 96.8  F (36  C)   Resp 16   Ht 1.702 m (5' 7\")   Wt 97.2 kg (214 lb 3.2 oz)   SpO2 94%   " BMI 33.55 kg/m    Exam:  GENERAL APPEARANCE:  Alert, in no distress, oriented  ENT:  Mouth and posterior oropharynx normal, moist mucous membranes, normal hearing acuity  EYES:  EOM, conjunctivae, lids, pupils and irises normal  NECK:  No adenopathy,masses or thyromegaly  RESP:  respiratory effort and palpation of chest normal, lungs clear to auscultation , no respiratory distress  CV:  Palpation and auscultation of heart done , regular rate and rhythm, no murmur, rub, or gallop, peripheral edema 1-2+ in BLE  ABDOMEN:  normal bowel sounds, soft, nontender, no hepatosplenomegaly or other masses, no guarding or rebound  M/S:   Ambulates with walker  SKIN:  Inspection of skin and subcutaneous tissue baseline, Palpation of skin and subcutaneous tissue baseline, wound healing well, no signs of infection no drainage. mild swelling.   NEURO:   Cranial nerves 2-12 are normal tested and grossly at patient's baseline, no purposeful movement in upper and lower extremities  PSYCH:  oriented X 3, normal insight, judgement and memory, affect and mood normal    Lab/Diagnostic data:  Labs done in SNF are in [place] EPIC. Please refer to them using [employer] Everywhere.   Recent BMP on 4/11/23:  sodium-146  Potassium 4.2  Chloride 102  Calcium 10.7  BUN 40  Creatinine 1.56  GFR 47    Magnesium- 1.7  Phosphorus- 1.7  INR- 1.2    CBC noted on 4/8/23:  WBC 8.8  RBC 3.37  hgb 9.1  Platelet 160    Last Comprehensive Metabolic Panel:  Lab Results   Component Value Date     04/12/2023    POTASSIUM 3.2 (L) 04/12/2023    CHLORIDE 102 04/12/2023    CO2 29 04/12/2023    ANIONGAP 14 04/12/2023    GLC 86 04/12/2023    BUN 34.0 (H) 04/12/2023    CR 1.58 (H) 04/12/2023    GFRESTIMATED 46 (L) 04/12/2023    TAWNYA 10.3 (H) 04/12/2023       Lab Results   Component Value Date    WBC 6.4 04/12/2023    WBC 7.3 11/08/2006     Lab Results   Component Value Date    RBC 3.62 04/12/2023    RBC 4.85 11/08/2006     Lab Results   Component Value Date    HGB  9.7 04/12/2023    HGB 14.5 11/08/2006     Lab Results   Component Value Date    HCT 34.0 04/12/2023    HCT 42.0 11/08/2006     No components found for: MCT  Lab Results   Component Value Date    MCV 94 04/12/2023    MCV 87 11/08/2006     Lab Results   Component Value Date    MCH 26.8 04/12/2023    MCH 29.9 11/08/2006     Lab Results   Component Value Date    MCHC 28.5 04/12/2023    MCHC 34.5 11/08/2006     Lab Results   Component Value Date    RDW 18.4 04/12/2023    RDW 16.1 11/08/2006     Lab Results   Component Value Date     04/12/2023     11/08/2006         ASSESSMENT/PLAN:    (R53.81) Physical deconditioning  (primary encounter diagnosis)  (M62.81) Generalized muscle weakness  Comment: Acute on chronic. 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 presented to [facility] ED on 4/2/23 after a ground level fall. Patient reports that in the morning of 4/2 he was walking through the house when he lost his balance and fell backward. Reports landing on his back and striking his head, causing his neck to hyperflex. He was told he had LOC by the EMS crew and was unable to get up on his own. He noted pain to his head, neck and rest of his back. No nausea, vomiting, blurred/loss of vision, new focal weakness, numbness or tingling. No chest pain, abdominal pain. In ED, he described pain to the back of his head, neck and throughout his back. He also had hip pain which he attributed to laying on the wood floor after his fall. He noted several falls in recent past, including one 3 weeks ago in a restaurant.  "He last took his warfarin morning of 4/2. He has an appointment with neurology for evaluation of \"weakness in all 4 extremities, jerking movements/tremors worsening, more frequent falls\".  MRI under anesthesia completed 4/5 which showed traumatic injury of C5-6 disc space with asymmetric widening anteriorly and disruption of the anterior longitudinal ligament. He was thus taken to the OR on 4/7/23 for the following procedure: -- [place] C5[place]C6 [place] [place]  Plan:   -Follow up with neurosurgery as directed. Originally Scheduled for 4/21/23, however patient cancelled this as he thought he did not need this appt. Recommended he reschedules this within 1 month  -Follow up with neurology as directed. Scheduled for 9/18/23  -Follow up with infectious disease as directed in 6 months. Due Sept 2023.   -Continue doxycycline 100mg BID as directed which is chronic with no stop date.   -Continue Tylenol 1000mg TID PRN  -Aspercreme 4% patch daily  -Robaxin 500mg TID PRN  -Oxycodone 2.5mg every 6 hours PRN x 2 week only then stop. Advise and recommend to limit due to history of confusion with use.   -Continue vitamin D daily  -Continue gabapentin 200mg BID for now.   -No driving until cleared by neurosurgery  -No lifting greater than 10 pounds. Wear your hard cervical collar only when out of bed. No need to wear c-collar in bed, ok to take off with meals and showers if sitting in shower chair. Avoid excessive twisting or bending of your neck. Avoid repetitive overhead reaching, pulling, and pushing motions. Use a supportive pillow when sleeping. No driving until seen in the Neurosurgery Clinic. Call the Neurosurgery clinic to discuss return to work.  -Monitor incision daily for signs of infection, which include redness, drainage, swelling, tenderness and warmth to the touch. Keep incision dry, pat dry after shower. Dermabond (skin glue) was used -- Do NOT pick or peel off Dermabond. No need to cover incision while " showering, but do not scrub or apply lotions. Do not soak in tub or pool until incision is completely healed in 30 days. Leave incision open to air, unless it is rubbing on collar and then may cover with dressing.  -BMP pending for today 4/12/23--results pending  -BMP, CBC and INR due Friday 4/14/23    (M05.9) Seropositive rheumatoid arthritis (H)  Comment: Acute on chronic. Followed by rheumatology with [facility] with last visit 3/21/23. Presented typically with severe polyarthralgias (hands, shoulders, knees, ankles ) for 3-4 months, in the [facility]. He actually developed a gluteal sore from sitting so much d/t joint pain. Significant am stiffness, difficulty making a fist. The knees are the most painful. He received cortisone shots which improved symptoms intitially, but stopped working. He also tried synvisc in both knees x1 which did not help at all. W/ trial of prednisone. ESR 61-->55, CRP 2.7--->2.0 mg/dL, this helped but temporarily. Not that much. Not miracle cure. They changed him to ARAVA/ RTX combo in 9/2019, but the 2nd infuson was delayed due to numerous medical complications.  Plan:  -Follow up with rheumatology as directed. Scheduled for 7/10/23  -Monitor pain complaints  -Allopurinol 300mg daily  -BMP pending for today 4/12/23--results pending  -BMP, CBC and INR due Friday 4/14/23    (R13.10) Dysphagia, unspecified type  Comment: Acute. On POD#2 concern for some aspiration with thickened liquid (appeared to be mixed too thin per RN staff). Patient also complains of globus sensation when he swallows, feels like he is swallowing pills whenever he swallows any liquid. SLP reconsulted, recommend continuing with same IDDSI 7a diet with level 2 mildly thick liquids. Globus sensation should improve over next several days as postop swelling improves.  Plan:   -Continue Speech and Language therapy as directed to advance diet as tolerated if able  -Continue regular easy to chew diet along with NTL. Must be upright in  chair for all oral intake  -BMP pending for today 4/12/23--results pending  -BMP, CBC and INR due Friday 4/14/23    (I10) Essential hypertension, benign  (E78.5) Hyperlipidemia LDL goal <130  (I50.30) Heart failure with preserved ejection fraction, NYHA class I (H)  (I25.10) Coronary artery disease involving native coronary artery of native heart without angina pectoris  Comment: Chronic. Followed by  cardiology with last visit 3/20/23. Past ECHO 10/22 revealed The estimated ejection fraction is 55-60%. Wall thickness is mildly increased. Left ventricular segmental wall motion is normal.  Plan:   -Follow up with cardiology as directed. Scheduled for 5/16/23 with provider and 5/9/23 with labs.   -Continue asa and statin as directed  -Start potassium chloride 20mEq daily due to hypokalemia risks.   -Continue metoprolol succinate 50mg daily  -BMP pending for today 4/12/23--results pending  -BMP, CBC and INR due Friday 4/14/23    (I48.0) Paroxysmal atrial fibrillation (H)  (Z79.01) Chronic anticoagulation  (Z86.79) Hx of ventricular tachycardia  Comment: Acute on chronic. On 4/10, overnight V tach with patient asymptomatic, then back to A fib. Patient doing well this AM. Na at 151 today,will need to improve before D/C. Treated with 500ml IV fluid bolus and given potassium x2. Negative for bilateral lower extremity venous doppler.  Neurosurgery cleared to restart warfarin on 4/11. INR today 4/12-1.2  Plan:   -Continue metoprolol succinate 50mg daily  -Coumadin 5mg daily  -Monitor BP and HR  -Monitor for worsening s/sx of concerns  -BMP pending for today 4/12/23--results pending  -BMP, CBC and INR due Friday 4/14/23    (I13.10) Cardiorenal syndrome without renal failure  (N18.30) Stage 3 chronic kidney disease, unspecified whether stage 3a or 3b CKD (H)  Comment: Chronic. Followed by  nephrology with last visit 3/29/23. Documentation of kidney function was at 31%. Nonoliguric SUDEEP on CKD, likely in the setting of  over-diuresis: Previously (since the right heart catheterization of 02/17/2023) on torsemide 60 mg b.i.d. and metolazone 2.5 mg q.d. prn which he had been taking more frequently. Both diuretics held this hospitalization, BUN and creatinine have progressively improved.   Plan:   -Monitor urinary status  -DAily use of compression stockings from home.   -Monitor weights daily. Ideal weights around 205#. Weight in hospital on 4/10 was documented 206#  -Limit 2gm sodium diet.   -Follow up with nephrology as directed within 4 months. Due July 2023.   -BMP pending for today 4/12/23--results pending  -BMP, CBC and INR due Friday 4/14/23    (E83.39) Hypophosphatemia  Comment: Acute. On 4/11 phosphorous 1.7 and replaced orally. Na down to 146. Medicine team agreed ok to discharge and no bridge needed to restart warfarin.   Plan:   -Follow up with nephrology as directed  -Continue K phos BID x 6 doses per hospital orders.   -BMP pending for today 4/12/23--results pending  -BMP, CBC and INR due Friday 4/14/23    (Z87.898) History of confusion  Comment: Acute. On 4/6 In the afternoon, patient became more somnolent following administration of oxycodone. Narcan administered, 0.2mg x2 doses. STAT labs ordered and are largely unrevealing, suspect polypharmacy and possible delayed anesthesia reaction for new somnolence. Discussed new increased somnolence with Nisha from NSGY team. Continue to monitor closely. Some confusion on the morning of POD#1, though improved by afternoon.  Plan:   -Monitor for worsening s/sx of concerns  -Monitor mood and behaviors  -Monitor for changes in mobility, eating and sleeping patterns  -BMP pending for today 4/12/23--results pending  -BMP, CBC and INR due Friday 4/14/23    (F33.9) Episode of recurrent major depressive disorder, unspecified depression episode severity (H)  Comment: Chronic. Stable  Plan:   -Monitor mood and behaviors  -Monitor for changes in mobility, eating and sleeping  patterns  -Continue bupropion 150mg XL daily  -Continue prozac 40mg daily  -Continue trazodone 100mg daily at HS  -BMP pending for today 4/12/23--results pending  -BMP, CBC and INR due Friday 4/14/23    (D64.9) Anemia, unspecified type  Comment: Acute on chronic. Baseline hgb~9s  Plan:   -Monitor bleeding risks  -Monitor for worsening s/sx of concerns  -Continue ferrous sulfate 325mg 3x weekly  -Continue folic acid 2mg daily  -BMP pending for today 4/12/23--results pending  -BMP, CBC and INR due Friday 4/14/23    (J44.9) Chronic obstructive pulmonary disease, unspecified COPD type (H)  (J96.12) Chronic respiratory failure with hypercapnia  Comment: Chronic. Followed by  pulmonology with last visit 2/21/23  Plan:   -Monitor respiratory status  -Continue albuterol inhaler PRN  -Continue trelegy ellipta daily  -BMP pending for today 4/12/23--results pending  -BMP, CBC and INR due Friday 4/14/23  -Follow up with pulmonology as directed post TCU    (Z87.39) Hx of gout  Comment: Chronic. Stable.   Plan:  -Monitor gout flares  -Continue allopurinol 300mg daily  -BMP pending for today 4/12/23--results pending  -BMP, CBC and INR due Friday 4/14/23    (K59.01) Slow transit constipation  Comment: Acute on chronic. S/T polypharmacy  Plan:   -Monitor BM patterns  -Miralax daily PRN  -Bisacodyl daily PRN if no BM in 72 hours  -Senna S 2 tabs BID PRN    (E11.22) Type 2 diabetes mellitus with chronic kidney disease, without long-term current use of insulin, unspecified CKD stage (H)  Comment: Acute on chronic. Not on any insulin or oral agents. A1C noted per chart review 3/1/23 was 6.1%. Goal <8%.   Plan:   -Monitor for worsening s/sx of concerns  -Follow up with primary PCP post TCU  -BMP pending for today 4/12/23--results pending  -BMP, CBC and INR due Friday 4/14/23    (E21.3) Hyperparathyroidism, unspecified (H)  (E83.52)  Chronic intermittent hypercalcemia.   Comment: Acute on chronic. Followed by [facility] endocrinology. Chronic  intermittent hypercalcemia. 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 Plan per last outpatient nephrology note (Dr Harding) in 09/2022 is to start cinacalcet if hypercalcemia persists. Has been receiving D5W 500cc over 5 hours x 1 noted on 4/10/23  Plan:   -Monitor for worsening s/sx of concerns  -Follow up with endocrinology as directed. Scheduled for 8/30/23  -BMP pending for today 4/12/23--results pending  -BMP, CBC and INR due Friday 4/14/23    (E87.0) Hypernatremia  Comment: Acute. In the setting of poor free water intake. Patient does not like thickened liquids---likely reasons for new hypernatremia since thickened liquids started. D5W given over 5 hours on 4/10  Plan:  -Monitor for worsening s/sx of concerns  -Follow up with nephrology as directed  -Encourage oral intake  -BMP pending for today 4/12/23--results pending  -BMP, CBC and INR due Friday 4/14/23      Electronically signed by:  Rajani Elkins DNP, APRN                          Sincerely,        PRASHANT Saunders CNP

## 2023-04-13 ENCOUNTER — LAB REQUISITION (OUTPATIENT)
Dept: LAB | Facility: CLINIC | Age: 73
End: 2023-04-13
Payer: MEDICARE

## 2023-04-13 DIAGNOSIS — I10 ESSENTIAL (PRIMARY) HYPERTENSION: ICD-10-CM

## 2023-04-14 ENCOUNTER — TRANSITIONAL CARE UNIT VISIT (OUTPATIENT)
Dept: GERIATRICS | Facility: CLINIC | Age: 73
End: 2023-04-14
Payer: MEDICARE

## 2023-04-14 VITALS
HEART RATE: 80 BPM | TEMPERATURE: 96.9 F | BODY MASS INDEX: 31.14 KG/M2 | SYSTOLIC BLOOD PRESSURE: 117 MMHG | DIASTOLIC BLOOD PRESSURE: 61 MMHG | OXYGEN SATURATION: 95 % | HEIGHT: 68 IN | WEIGHT: 205.5 LBS | RESPIRATION RATE: 17 BRPM

## 2023-04-14 DIAGNOSIS — I25.10 CORONARY ARTERY DISEASE INVOLVING NATIVE CORONARY ARTERY OF NATIVE HEART WITHOUT ANGINA PECTORIS: ICD-10-CM

## 2023-04-14 DIAGNOSIS — S13.4XXD INJURY TO LIGAMENT OF CERVICAL SPINE, SUBSEQUENT ENCOUNTER: ICD-10-CM

## 2023-04-14 DIAGNOSIS — E21.3 HYPERPARATHYROIDISM, UNSPECIFIED (H): ICD-10-CM

## 2023-04-14 DIAGNOSIS — M46.26 INFECTION OF LUMBAR SPINE (H): ICD-10-CM

## 2023-04-14 DIAGNOSIS — I13.10: ICD-10-CM

## 2023-04-14 DIAGNOSIS — E87.6 HYPOKALEMIA: ICD-10-CM

## 2023-04-14 DIAGNOSIS — E44.0 MODERATE PROTEIN-CALORIE MALNUTRITION (H): ICD-10-CM

## 2023-04-14 DIAGNOSIS — R29.6 FALLS FREQUENTLY: Primary | ICD-10-CM

## 2023-04-14 DIAGNOSIS — M62.81 GENERALIZED MUSCLE WEAKNESS: ICD-10-CM

## 2023-04-14 DIAGNOSIS — E78.5 HYPERLIPIDEMIA LDL GOAL <130: ICD-10-CM

## 2023-04-14 DIAGNOSIS — R13.10 DYSPHAGIA, UNSPECIFIED TYPE: ICD-10-CM

## 2023-04-14 DIAGNOSIS — K21.00 GASTROESOPHAGEAL REFLUX DISEASE WITH ESOPHAGITIS WITHOUT HEMORRHAGE: ICD-10-CM

## 2023-04-14 DIAGNOSIS — E83.52 HYPERCALCEMIA: ICD-10-CM

## 2023-04-14 DIAGNOSIS — I10 ESSENTIAL HYPERTENSION, BENIGN: ICD-10-CM

## 2023-04-14 DIAGNOSIS — I50.30 HEART FAILURE WITH PRESERVED EJECTION FRACTION, NYHA CLASS I (H): ICD-10-CM

## 2023-04-14 DIAGNOSIS — I27.20 PULMONARY HYPERTENSION (H): ICD-10-CM

## 2023-04-14 DIAGNOSIS — D64.9 ANEMIA, UNSPECIFIED TYPE: ICD-10-CM

## 2023-04-14 DIAGNOSIS — R53.81 PHYSICAL DECONDITIONING: ICD-10-CM

## 2023-04-14 DIAGNOSIS — N18.30 STAGE 3 CHRONIC KIDNEY DISEASE, UNSPECIFIED WHETHER STAGE 3A OR 3B CKD (H): ICD-10-CM

## 2023-04-14 DIAGNOSIS — E87.0 HYPERNATREMIA: ICD-10-CM

## 2023-04-14 DIAGNOSIS — M05.9 SEROPOSITIVE RHEUMATOID ARTHRITIS (H): ICD-10-CM

## 2023-04-14 DIAGNOSIS — Z79.01 CHRONIC ANTICOAGULATION: ICD-10-CM

## 2023-04-14 DIAGNOSIS — Z86.79 HX OF VENTRICULAR TACHYCARDIA: ICD-10-CM

## 2023-04-14 DIAGNOSIS — J44.9 CHRONIC OBSTRUCTIVE PULMONARY DISEASE, UNSPECIFIED COPD TYPE (H): ICD-10-CM

## 2023-04-14 DIAGNOSIS — Z98.890 H/O CERVICAL SPINE SURGERY: ICD-10-CM

## 2023-04-14 LAB
ANION GAP SERPL CALCULATED.3IONS-SCNC: 13 MMOL/L (ref 7–15)
BUN SERPL-MCNC: 36.1 MG/DL (ref 8–23)
CALCIUM SERPL-MCNC: 9.5 MG/DL (ref 8.8–10.2)
CHLORIDE SERPL-SCNC: 101 MMOL/L (ref 98–107)
CREAT SERPL-MCNC: 1.89 MG/DL (ref 0.67–1.17)
DEPRECATED HCO3 PLAS-SCNC: 30 MMOL/L (ref 22–29)
ERYTHROCYTE [DISTWIDTH] IN BLOOD BY AUTOMATED COUNT: 18.5 % (ref 10–15)
GFR SERPL CREATININE-BSD FRML MDRD: 37 ML/MIN/1.73M2
GLUCOSE SERPL-MCNC: 88 MG/DL (ref 70–99)
HCT VFR BLD AUTO: 29.1 % (ref 40–53)
HGB BLD-MCNC: 8.5 G/DL (ref 13.3–17.7)
MCH RBC QN AUTO: 27.4 PG (ref 26.5–33)
MCHC RBC AUTO-ENTMCNC: 29.2 G/DL (ref 31.5–36.5)
MCV RBC AUTO: 94 FL (ref 78–100)
PLATELET # BLD AUTO: 175 10E3/UL (ref 150–450)
POTASSIUM SERPL-SCNC: 3.6 MMOL/L (ref 3.4–5.3)
RBC # BLD AUTO: 3.1 10E6/UL (ref 4.4–5.9)
SODIUM SERPL-SCNC: 144 MMOL/L (ref 136–145)
WBC # BLD AUTO: 6.8 10E3/UL (ref 4–11)

## 2023-04-14 PROCEDURE — 36415 COLL VENOUS BLD VENIPUNCTURE: CPT | Mod: ORL | Performed by: FAMILY MEDICINE

## 2023-04-14 PROCEDURE — 80048 BASIC METABOLIC PNL TOTAL CA: CPT | Mod: ORL | Performed by: FAMILY MEDICINE

## 2023-04-14 PROCEDURE — 99309 SBSQ NF CARE MODERATE MDM 30: CPT | Performed by: PHYSICIAN ASSISTANT

## 2023-04-14 PROCEDURE — P9603 ONE-WAY ALLOW PRORATED MILES: HCPCS | Mod: ORL | Performed by: FAMILY MEDICINE

## 2023-04-14 PROCEDURE — 85027 COMPLETE CBC AUTOMATED: CPT | Mod: ORL | Performed by: FAMILY MEDICINE

## 2023-04-14 NOTE — LETTER
"    4/14/2023        RE: Noe Mason  [address]  [place] [address]        [facility]    Chief Complaint   Patient presents with     RECHECK     HPI:  Noe Mason is a 73 year old  (1950), who is being seen today for an episodic care visit at: [facility] ([facility]) [[id number]].     Summary from prior provider: 72 y.o. male with PMHx significant for HTN, HFpEF, CAD s/p PCI, afib on Warfarin, CKD, DMII who presented to [facility] ED on 4/2/23 after a ground level fall. Patient reports that in the morning of 4/2 he was walking through the house when he lost his balance and fell backward. Reports landing on his back and striking his head, causing his neck to hyperflex. He was told he had LOC by the EMS crew and was unable to get up on his own. He noted pain to his head, neck and rest of his back. No nausea, vomiting, blurred/loss of vision, new focal weakness, numbness or tingling. No chest pain, abdominal pain. In ED, he described pain to the back of his head, neck and throughout his back. He also had hip pain which he attributed to laying on the wood floor after his fall. He noted several falls in recent past, including one 3 weeks ago in a restaurant. He last took his warfarin morning of 4/2. He has an appointment with neurology for evaluation of \"weakness in all 4 extremities, jerking movements/tremors worsening, more frequent falls\". ED work up was significant for the following injuries: -- widening of C5-6 disc space with prevertebral soft tissue edema.    Patient was admitted from 4/2 - 4/1, 2023. He was seen in consultation with neurosurgery, endocrine, and nephrology. Hospitalization was complicated by SUDEEP, single episode of NSVT related to multiple electrolyte abnormalities including hypercalcemia, hypokalemia, hypophosphatemia, hypernatremia. Calcium supplement was discontinued at discharge and pt supported for dehydration. At time of discharge, electrolyte " "abnormalities had resolved.    Patient is seen today in follow-up, wife is at bedside and assists with history. They report he is doing well and progressing in therapies. They describe his recent falls and acute episodes of dysequilibrium as drop-attacks, occurring without warning. He has not had further episodes since arrival at TCU. He and wife deny sob, cp, dizziness, abdominal discomfort. He reports pain primarily occurs in morning upon waking from sleep and is localized to right upper shoulder. Wife brought in some bengay cream today as lidocaine patches are minimally effective. He continues to follow with speech therapy regarding dysphagia, wife notes he has not been drinking as much fluids as normal due to thickening. He recently had some thin liquids during a speech session which reportedly went well. Speech has recommended a formal video swallow study to evaluate if he can graduate to thin liquids. Due to see neurosurgery early next week, no numbness/tingling to extremities.    Allergies, and PMH/PSH reviewed in EPIC today.  REVIEW OF SYSTEMS:  4 point ROS including Respiratory, CV, GI and , other than that noted in the HPI,  is negative    Objective:   /61   Pulse 80   Temp 96.9  F (36.1  C)   Resp 17   Ht 1.727 m (5' 8\")   Wt 93.2 kg (205 lb 8 oz)   SpO2 95%   BMI 31.25 kg/m      GEN: well-developed, well-nourished, appears comfortable  HEENT: EOM intact bilaterally, sclera clear, conjunctiva normal, nose & mouth patent, mucous membranes dry  NECK: c-collar in place, nontender to palpation of spinous processes  CHEST: lungs CTA bilaterally, no increased work of breathing, no wheeze, crackles, rhonchi  HEART: RRR, S1 & S2, no murmur  ABD: soft, nontender, nondistended, no guarding or rigidity, +BS in all 4 quadrants  MSK: AROM bilateral UE/LE, pedal & radial pulses 2+ bilaterally  NEURO: awake, alert, oriented to name, place, and time. CN II-XII grossly intact. Sensation grossly intact to " light touch.   SKIN: warm & dry without rash, no pedal edema      Most Recent 3 CBC's:  Recent Labs   Lab Test 04/14/23  0556 04/12/23  0654 11/18/19  0723   WBC 6.8 6.4 5.8   HGB 8.5* 9.7* 11.8*   MCV 94 94 92    197 324     Most Recent 3 BMP's:  Recent Labs   Lab Test 04/14/23  0556 04/12/23  0654 11/18/19  0723    145 141   POTASSIUM 3.6 3.2* 4.2   CHLORIDE 101 102 96*   CO2 30* 29 35*   BUN 36.1* 34.0* 23*   CR 1.89* 1.58* 1.28   ANIONGAP 13 14 10   TAWNYA 9.5 10.3* 10.8*   GLC 88 86 91         Assessment/Plan:    Frequent falls  C5-6 traumatic injury to disc space with anterior ligamentous injury s/p ACDF 4/7/23  Physical deconditioning  Impaired mobility and ADLs  As above, sustained ground level fall with associated head trauma and neck pain. MRI confirmed above injury, pt subsequently underwent surgery as noted above per neurosurgical team.  *Pt with frequent falls over preceding months, described as drop attacks, occurring without warning and while ambulating. Described as his legs suddenly going weak and he falls to the ground. No lightheadedness or shortness of breath, no prodrome. Patient does admit to chronic intermittent dizziness but does not appear to coincide with falls. Falls are both witnessed and unwitnessed, pt is not confused immediately following and can recall the event. No loss of bladder.  -Pain control with tylenol 1000mg TID PRN, aspercreme 4% patch daily, robaxin 500mg TID PRN, Oxy 2.5mg q6h PRN x2 week supply, gabapentin 200mg BID  -C-collar when out of bed, ok to remove in bed  -PT/OT  -No lifting >10#, no driving  -Follow-up with neurosurgery as directed, scheduled 4/21  -Follow with neurology as previously scheduled regarding generalized weakness, scheduled 9/18/23  -TSH, Vitamin B12, Vitamin D on 4/17  -Consider outpatient cardiac monitoring, such as 14-day zio patch or prolonged holter monitor, to rule-out cardiac etiology of falls    Dysphagia  Followed by SLP while  inpatient due to concerns of aspiration, discharged on DDL2 with mildly thickened liquids. Suspected acute and related to above. Has continued to follow with SLP at TCU, recently recommended to undergo repeat VSS.  -Video swallow study  -Continue following with SLP  -Continues with thickened liquids until cleared by SLP    Afib, permanent  Hx NSVT  On chronic AC with warfarin, resumed on 4/11. Pt with episodes of asymptomatic NSVT while inpatient 4/9 not requiring intervention. Unclear etiology, felt to be related to multiple electrolyte imbalances.  -Continues on metoprolol 50mg daily  -Continues on warfarin 5mg daily    HTN / HLD  Pulmonary HTN  HFpEF  CAD s/p PCI x3  Follows with  cardiology, most recent visit 3/20/23. Pt has had PCI x3, most recently to mid LCx on 1/30/2018. Most recent TTE 10/2022 with LVEF 55-60%, no WMAs. Underwent RHC on 2/17/23 due to persistent LE edema resistant to diuretics with findings of mild pulmonary HTN. Pt's torsemide was increased to 60mg BID and added metolazone 2.5mg daily PRN for weight greater than 205#. Diuretics were held at discharge due to SUDEEP and dehydration. Weight today 205#.  -Continues on ASA, statin, KCl, metoprolol 50mg daily  -Daily weights, monitor volume status closely  -Follow up with Cardiology as scheduled 5/16/23 (labs 5/9/23)    SUDEEP on CKD-3  Hypokalemia, resolved  Hypernatremia, resolved  Cardiorenal syndrome  Follows with  nephrology, last visit 3/29/23. SUDEEP to value 3.5 while inpatient, baseline Cr has been worsening since 2011 due to recurrent SUDEEP with range of 1.1-2.9 per nephrology documentation. Etiology of electrolyte abnormalities likely 2/2 dehydration with overdiuresis. Diuretics held at discharge. Cr 1.58--1.89, likely ongoing prerenal origin given poor po intake while at TCU due to dysphagia. Electrolytes wnl on today's labs.  -Encourage PO intake  -Continues on KCl 20mEq daily  -Repeat BMP on 4/17  -Follow with nephrology as  directed    Chronic intermittent hypercalcemia  Primary hyperparathyroidism  Calcium improved with IV hydration while inpatient, calcium supplementation discontinued. If hypercalcemia persists, plan to start cinacalcet per nephrology. Follow-up value on today's labs wnl at 9.5.  -Monitor Calcium values  -Holding all calcium supplements  -Follows with nephrology    Anemia, iron deficiency  Baseline Hgb ~9. On AC with warfarin as above. No evidence of active bleeding. Follow-up CBC with Hgb 8.5 today.  -Monitor for bleeding  -Continue ferrous sulfate 3x weekly  -Continue folic acid 2mg daily  -Repeat CBC on 4/17    RA, seronegative  Followed by rheumatology through , last visit 3/21/23. Severe associated pain, minimally improved with trial of prednisone. Affecting multiple joints, knees worst. Has been on ARAVA/RTX infusions which have been discontinued due to presumed lung injury.  -Continues on allopurinol 300mg daily  -Monitor pain complaints  -Follow up with rheumatology as scheduled 7/10/23    Chronic mucopurulent bronchitis-COPD  Untreated MARYELLEN with hypercarbia  Follows with pulmonology. Chronic and stable. Does not use CPAP/BiPAP.  -Continue trelegy ellipta daily, albuterol inhaler PRN    Hx lumbar MSSA and staph epi wound infection following TLIF in 08/2022  Completed treatment, followed by ID. On chronic suppression with doxycycline.  -Continue doxycycline 100mg BID  -Follow with ID as scheduled    Insomnia  -Continue trazodone 100mg at bedtime     Depression/CHASE  Chronic, stable.  -Continue fluoxetine 40mg daily, bupropion 150mg daily    GERD  Chronic, stable.  -Continue omeprazole 20mg daily, carafate 1g at bedtime    Malnutrition, moderate, acute  Pt scoring 5 on MNA and has had intake <75% of estimated needs over past >7d, mild loss of subcutaneous fat, muscle mass and mild amount of fluid accumulation indicating moderate malnutrition.  -RD following      MED REC REQUIRED  Post Medication Reconciliation  Status: medication reconcilation previously completed during another office visit      Orders:  -BMP, CBC, TSH, Vit B12, Vit D on 4/17  -Encourage PO intake  -Video swallow study    Electronically signed by: Louie Stanley PA-C             Sincerely,        Louie Stanley PA-C

## 2023-04-14 NOTE — PROGRESS NOTES
"[facility] GERIATRICS    Chief Complaint   Patient presents with     RECHECK     HPI:  Noe Mason is a 73 year old  (1950), who is being seen today for an episodic care visit at: [facility] ([facility]) [[id number]].     Summary from prior provider: 72 y.o. male with PMHx significant for HTN, HFpEF, CAD s/p PCI, afib on Warfarin, CKD, DMII who presented to [facility] ED on 4/2/23 after a ground level fall. Patient reports that in the morning of 4/2 he was walking through the house when he lost his balance and fell backward. Reports landing on his back and striking his head, causing his neck to hyperflex. He was told he had LOC by the EMS crew and was unable to get up on his own. He noted pain to his head, neck and rest of his back. No nausea, vomiting, blurred/loss of vision, new focal weakness, numbness or tingling. No chest pain, abdominal pain. In ED, he described pain to the back of his head, neck and throughout his back. He also had hip pain which he attributed to laying on the wood floor after his fall. He noted several falls in recent past, including one 3 weeks ago in a restaurant. He last took his warfarin morning of 4/2. He has an appointment with neurology for evaluation of \"weakness in all 4 extremities, jerking movements/tremors worsening, more frequent falls\". ED work up was significant for the following injuries: -- widening of C5-6 disc space with prevertebral soft tissue edema.    Patient was admitted from 4/2 - 4/1, 2023. He was seen in consultation with neurosurgery, endocrine, and nephrology. Hospitalization was complicated by SUDEEP, single episode of NSVT related to multiple electrolyte abnormalities including hypercalcemia, hypokalemia, hypophosphatemia, hypernatremia. Calcium supplement was discontinued at discharge and pt supported for dehydration. At time of discharge, electrolyte abnormalities had resolved.    Patient is seen today in follow-up, wife is at bedside and assists " "with history. They report he is doing well and progressing in therapies. They describe his recent falls and acute episodes of dysequilibrium as drop-attacks, occurring without warning. He has not had further episodes since arrival at TCU. He and wife deny sob, cp, dizziness, abdominal discomfort. He reports pain primarily occurs in morning upon waking from sleep and is localized to right upper shoulder. Wife brought in some bengay cream today as lidocaine patches are minimally effective. He continues to follow with speech therapy regarding dysphagia, wife notes he has not been drinking as much fluids as normal due to thickening. He recently had some thin liquids during a speech session which reportedly went well. Speech has recommended a formal video swallow study to evaluate if he can graduate to thin liquids. Due to see neurosurgery early next week, no numbness/tingling to extremities.    Allergies, and PMH/PSH reviewed in EPIC today.  REVIEW OF SYSTEMS:  4 point ROS including Respiratory, CV, GI and , other than that noted in the HPI,  is negative    Objective:   /61   Pulse 80   Temp 96.9  F (36.1  C)   Resp 17   Ht 1.727 m (5' 8\")   Wt 93.2 kg (205 lb 8 oz)   SpO2 95%   BMI 31.25 kg/m      GEN: well-developed, well-nourished, appears comfortable  HEENT: EOM intact bilaterally, sclera clear, conjunctiva normal, nose & mouth patent, mucous membranes dry  NECK: c-collar in place, nontender to palpation of spinous processes  CHEST: lungs CTA bilaterally, no increased work of breathing, no wheeze, crackles, rhonchi  HEART: RRR, S1 & S2, no murmur  ABD: soft, nontender, nondistended, no guarding or rigidity, +BS in all 4 quadrants  MSK: AROM bilateral UE/LE, pedal & radial pulses 2+ bilaterally  NEURO: awake, alert, oriented to name, place, and time. CN II-XII grossly intact. Sensation grossly intact to light touch.   SKIN: warm & dry without rash, no pedal edema      Most Recent 3 CBC's:  Recent Labs "   Lab Test 04/14/23  0556 04/12/23  0654 11/18/19  0723   WBC 6.8 6.4 5.8   HGB 8.5* 9.7* 11.8*   MCV 94 94 92    197 324     Most Recent 3 BMP's:  Recent Labs   Lab Test 04/14/23  0556 04/12/23  0654 11/18/19  0723    145 141   POTASSIUM 3.6 3.2* 4.2   CHLORIDE 101 102 96*   CO2 30* 29 35*   BUN 36.1* 34.0* 23*   CR 1.89* 1.58* 1.28   ANIONGAP 13 14 10   TAWNYA 9.5 10.3* 10.8*   GLC 88 86 91         Assessment/Plan:    Frequent falls  C5-6 traumatic injury to disc space with anterior ligamentous injury s/p ACDF 4/7/23  Physical deconditioning  Impaired mobility and ADLs  As above, sustained ground level fall with associated head trauma and neck pain. MRI confirmed above injury, pt subsequently underwent surgery as noted above per neurosurgical team.  *Pt with frequent falls over preceding months, described as drop attacks, occurring without warning and while ambulating. Described as his legs suddenly going weak and he falls to the ground. No lightheadedness or shortness of breath, no prodrome. Patient does admit to chronic intermittent dizziness but does not appear to coincide with falls. Falls are both witnessed and unwitnessed, pt is not confused immediately following and can recall the event. No loss of bladder.  -Pain control with tylenol 1000mg TID PRN, aspercreme 4% patch daily, robaxin 500mg TID PRN, Oxy 2.5mg q6h PRN x2 week supply, gabapentin 200mg BID  -C-collar when out of bed, ok to remove in bed  -PT/OT  -No lifting >10#, no driving  -Follow-up with neurosurgery as directed, scheduled 4/21  -Follow with neurology as previously scheduled regarding generalized weakness, scheduled 9/18/23  -TSH, Vitamin B12, Vitamin D on 4/17  -Consider outpatient cardiac monitoring, such as 14-day zio patch or prolonged holter monitor, to rule-out cardiac etiology of falls    Dysphagia  Followed by SLP while inpatient due to concerns of aspiration, discharged on DDL2 with mildly thickened liquids. Suspected  acute and related to above. Has continued to follow with SLP at TCU, recently recommended to undergo repeat VSS.  -Video swallow study  -Continue following with SLP  -Continues with thickened liquids until cleared by SLP    Afib, permanent  Hx NSVT  On chronic AC with warfarin, resumed on 4/11. Pt with episodes of asymptomatic NSVT while inpatient 4/9 not requiring intervention. Unclear etiology, felt to be related to multiple electrolyte imbalances.  -Continues on metoprolol 50mg daily  -Continues on warfarin 5mg daily    HTN / HLD  Pulmonary HTN  HFpEF  CAD s/p PCI x3  Follows with  cardiology, most recent visit 3/20/23. Pt has had PCI x3, most recently to mid LCx on 1/30/2018. Most recent TTE 10/2022 with LVEF 55-60%, no WMAs. Underwent RHC on 2/17/23 due to persistent LE edema resistant to diuretics with findings of mild pulmonary HTN. Pt's torsemide was increased to 60mg BID and added metolazone 2.5mg daily PRN for weight greater than 205#. Diuretics were held at discharge due to SUDEEP and dehydration. Weight today 205#.  -Continues on ASA, statin, KCl, metoprolol 50mg daily  -Daily weights, monitor volume status closely  -Follow up with Cardiology as scheduled 5/16/23 (labs 5/9/23)    SUDEEP on CKD-3  Hypokalemia, resolved  Hypernatremia, resolved  Cardiorenal syndrome  Follows with  nephrology, last visit 3/29/23. SUDEEP to value 3.5 while inpatient, baseline Cr has been worsening since 2011 due to recurrent SUDEEP with range of 1.1-2.9 per nephrology documentation. Etiology of electrolyte abnormalities likely 2/2 dehydration with overdiuresis. Diuretics held at discharge. Cr 1.58--1.89, likely ongoing prerenal origin given poor po intake while at TCU due to dysphagia. Electrolytes wnl on today's labs.  -Encourage PO intake  -Continues on KCl 20mEq daily  -Repeat BMP on 4/17  -Follow with nephrology as directed    Chronic intermittent hypercalcemia  Primary hyperparathyroidism  Calcium improved with IV hydration  while inpatient, calcium supplementation discontinued. If hypercalcemia persists, plan to start cinacalcet per nephrology. Follow-up value on today's labs wnl at 9.5.  -Monitor Calcium values  -Holding all calcium supplements  -Follows with nephrology    Anemia, iron deficiency  Baseline Hgb ~9. On AC with warfarin as above. No evidence of active bleeding. Follow-up CBC with Hgb 8.5 today.  -Monitor for bleeding  -Continue ferrous sulfate 3x weekly  -Continue folic acid 2mg daily  -Repeat CBC on 4/17    RA, seronegative  Followed by rheumatology through , last visit 3/21/23. Severe associated pain, minimally improved with trial of prednisone. Affecting multiple joints, knees worst. Has been on ARAVA/RTX infusions which have been discontinued due to presumed lung injury.  -Continues on allopurinol 300mg daily  -Monitor pain complaints  -Follow up with rheumatology as scheduled 7/10/23    Chronic mucopurulent bronchitis-COPD  Untreated MARYELLEN with hypercarbia  Follows with pulmonology. Chronic and stable. Does not use CPAP/BiPAP.  -Continue trelegy ellipta daily, albuterol inhaler PRN    Hx lumbar MSSA and staph epi wound infection following TLIF in 08/2022  Completed treatment, followed by ID. On chronic suppression with doxycycline.  -Continue doxycycline 100mg BID  -Follow with ID as scheduled    Insomnia  -Continue trazodone 100mg at bedtime     Depression/CHASE  Chronic, stable.  -Continue fluoxetine 40mg daily, bupropion 150mg daily    GERD  Chronic, stable.  -Continue omeprazole 20mg daily, carafate 1g at bedtime    Malnutrition, moderate, acute  Pt scoring 5 on MNA and has had intake <75% of estimated needs over past >7d, mild loss of subcutaneous fat, muscle mass and mild amount of fluid accumulation indicating moderate malnutrition.  -RD following      MED REC REQUIRED  Post Medication Reconciliation Status: medication reconcilation previously completed during another office visit      Orders:  -BMP, CBC, TSH,  Vit B12, Vit D on 4/17  -Encourage PO intake  -Video swallow study    Electronically signed by: Louie Stanley PA-C

## 2023-04-18 ENCOUNTER — LAB REQUISITION (OUTPATIENT)
Dept: LAB | Facility: CLINIC | Age: 73
End: 2023-04-18
Payer: MEDICARE

## 2023-04-18 ENCOUNTER — TRANSITIONAL CARE UNIT VISIT (OUTPATIENT)
Dept: GERIATRICS | Facility: CLINIC | Age: 73
End: 2023-04-18
Payer: MEDICARE

## 2023-04-18 VITALS
OXYGEN SATURATION: 96 % | SYSTOLIC BLOOD PRESSURE: 123 MMHG | BODY MASS INDEX: 30.42 KG/M2 | DIASTOLIC BLOOD PRESSURE: 75 MMHG | TEMPERATURE: 97 F | RESPIRATION RATE: 18 BRPM | HEART RATE: 68 BPM | HEIGHT: 68 IN | WEIGHT: 200.7 LBS

## 2023-04-18 DIAGNOSIS — E83.52 HYPERCALCEMIA: ICD-10-CM

## 2023-04-18 DIAGNOSIS — R53.81 PHYSICAL DECONDITIONING: ICD-10-CM

## 2023-04-18 DIAGNOSIS — I10 ESSENTIAL HYPERTENSION, BENIGN: ICD-10-CM

## 2023-04-18 DIAGNOSIS — E78.5 HYPERLIPIDEMIA LDL GOAL <130: ICD-10-CM

## 2023-04-18 DIAGNOSIS — Z74.09 IMPAIRED MOBILITY AND ACTIVITIES OF DAILY LIVING: ICD-10-CM

## 2023-04-18 DIAGNOSIS — D64.9 ANEMIA, UNSPECIFIED TYPE: ICD-10-CM

## 2023-04-18 DIAGNOSIS — M46.26 INFECTION OF LUMBAR SPINE (H): ICD-10-CM

## 2023-04-18 DIAGNOSIS — R13.10 DYSPHAGIA, UNSPECIFIED TYPE: ICD-10-CM

## 2023-04-18 DIAGNOSIS — N18.30 STAGE 3 CHRONIC KIDNEY DISEASE, UNSPECIFIED WHETHER STAGE 3A OR 3B CKD (H): ICD-10-CM

## 2023-04-18 DIAGNOSIS — Z78.9 IMPAIRED MOBILITY AND ACTIVITIES OF DAILY LIVING: ICD-10-CM

## 2023-04-18 DIAGNOSIS — E87.0 HYPERNATREMIA: ICD-10-CM

## 2023-04-18 DIAGNOSIS — I25.10 CORONARY ARTERY DISEASE INVOLVING NATIVE CORONARY ARTERY OF NATIVE HEART WITHOUT ANGINA PECTORIS: ICD-10-CM

## 2023-04-18 DIAGNOSIS — Z86.79 HX OF VENTRICULAR TACHYCARDIA: ICD-10-CM

## 2023-04-18 DIAGNOSIS — E87.6 HYPOKALEMIA: ICD-10-CM

## 2023-04-18 DIAGNOSIS — M62.81 GENERALIZED MUSCLE WEAKNESS: ICD-10-CM

## 2023-04-18 DIAGNOSIS — R29.6 FALLS FREQUENTLY: ICD-10-CM

## 2023-04-18 DIAGNOSIS — E21.3 HYPERPARATHYROIDISM, UNSPECIFIED (H): ICD-10-CM

## 2023-04-18 DIAGNOSIS — Z98.890 H/O CERVICAL SPINE SURGERY: ICD-10-CM

## 2023-04-18 DIAGNOSIS — E44.0 MODERATE PROTEIN-CALORIE MALNUTRITION (H): ICD-10-CM

## 2023-04-18 DIAGNOSIS — E55.9 VITAMIN D DEFICIENCY, UNSPECIFIED: ICD-10-CM

## 2023-04-18 DIAGNOSIS — I50.30 HEART FAILURE WITH PRESERVED EJECTION FRACTION, NYHA CLASS I (H): ICD-10-CM

## 2023-04-18 DIAGNOSIS — M05.9 SEROPOSITIVE RHEUMATOID ARTHRITIS (H): ICD-10-CM

## 2023-04-18 DIAGNOSIS — Z79.01 CHRONIC ANTICOAGULATION: ICD-10-CM

## 2023-04-18 DIAGNOSIS — D50.9 IRON DEFICIENCY ANEMIA, UNSPECIFIED: ICD-10-CM

## 2023-04-18 DIAGNOSIS — S13.4XXD INJURY TO LIGAMENT OF CERVICAL SPINE, SUBSEQUENT ENCOUNTER: Primary | ICD-10-CM

## 2023-04-18 DIAGNOSIS — I13.10 HYPERTENSIVE HEART AND CHRONIC KIDNEY DISEASE WITHOUT HEART FAILURE, WITH STAGE 1 THROUGH STAGE 4 CHRONIC KIDNEY DISEASE, OR UNSPECIFIED CHRONIC KIDNEY DISEASE: ICD-10-CM

## 2023-04-18 PROCEDURE — 99310 SBSQ NF CARE HIGH MDM 45: CPT | Performed by: PHYSICIAN ASSISTANT

## 2023-04-18 NOTE — LETTER
"    4/18/2023        RE: Noe Mason  [address]  [place] [address]        [facility] GERIATRICS    Chief Complaint   Patient presents with     RECHECK     HPI:  Noe Mason is a 73 year old  (1950), who is being seen today for an episodic care visit at: [facility] ([facility]) [[id number]].     Summary from prior provider: 72 y.o. male with PMHx significant for HTN, HFpEF, CAD s/p PCI, afib on Warfarin, CKD, DMII who presented to [facility] ED on 4/2/23 after a ground level fall. Patient reports that in the morning of 4/2 he was walking through the house when he lost his balance and fell backward. Reports landing on his back and striking his head, causing his neck to hyperflex. He was told he had LOC by the EMS crew and was unable to get up on his own. He noted pain to his head, neck and rest of his back. No nausea, vomiting, blurred/loss of vision, new focal weakness, numbness or tingling. No chest pain, abdominal pain. In ED, he described pain to the back of his head, neck and throughout his back. He also had hip pain which he attributed to laying on the wood floor after his fall. He noted several falls in recent past, including one 3 weeks ago in a restaurant. He last took his warfarin morning of 4/2. He has an appointment with neurology for evaluation of \"weakness in all 4 extremities, jerking movements/tremors worsening, more frequent falls\". ED work up was significant for the following injuries: -- widening of C5-6 disc space with prevertebral soft tissue edema.     Patient was admitted from 4/2 - 4/11, 2023. He was seen in consultation with neurosurgery, endocrine, and nephrology. Hospitalization was complicated by SUDEEP, single episode of NSVT related to multiple electrolyte abnormalities including hypercalcemia, hypokalemia, hypophosphatemia, hypernatremia. Calcium supplement was discontinued at discharge and pt supported for dehydration. At time of discharge, electrolyte " "abnormalities had resolved.    Patient is seen today in follow-up. He is sitting up in recliner at bedside, reports he is overall doing well. Continues to experience significant pain in his right shoulder which is most bothersome at night. Current agents are not helping a great deal, do take the edge off but are otherwise ineffective regarding the pain. Also reports numbness/tingling in bilateral lower extremities - which he admits is more chronic in nature. Further notes his legs are starting to be more edematous than usual lately, and his breathing has become more labored during therapies. He has been able to participate in therapies to full extent but feels he is more fatigued after sessions. Does have mild sob with exertion, does not interfere with activity but he does need to stop and catch his breath more frequently over the past few days. Notes his weight today was reported at 218#, weights have been somewhat erratic but generally appear to be near 210# range, he reports dry weight is closer to 200. No chest pain. No constipation. No fever, chills, cough. Continues on thickened liquid diet, video swallow has not yet been scheduled. Labs scheduled for 4/17 were missed.    Allergies, and PMH/PSH reviewed in EPIC today.  REVIEW OF SYSTEMS:  4 point ROS including Respiratory, CV, GI and , other than that noted in the HPI,  is negative    Objective:   /75   Pulse 68   Temp 97  F (36.1  C)   Resp 18   Ht 1.727 m (5' 8\")   Wt 91 kg (200 lb 11.2 oz)   SpO2 96%   BMI 30.52 kg/m    GEN: well-developed, well-nourished, appears comfortable  HEENT: NCAT, EOM intact bilaterally, sclera clear, conjunctiva normal, nose & mouth patent, mucous membranes moist  NECK: C-collar in place, nontender to spinous processes, incision open to air, c/d/i  CHEST: lungs with crackles in bilateral bases, no increased work of breathing, no wheeze  HEART: RRR, S1 & S2  ABD: soft, nontender, nondistended, no guarding or " rigidity, +BS in all 4 quadrants  MSK: AROM bilateral UE/LE  NEURO: awake, alert, oriented to name, place, and time. CN II-XII grossly intact. Sensation grossly intact to light touch.   SKIN: warm & dry without rash, 3+ pitting pedal edema bilaterally    Lab Requisition on 04/14/2023   Component Date Value Ref Range Status     Sodium 04/14/2023 144  136 - 145 mmol/L Final     Potassium 04/14/2023 3.6  3.4 - 5.3 mmol/L Final     Chloride 04/14/2023 101  98 - 107 mmol/L Final     Carbon Dioxide (CO2) 04/14/2023 30 (H)  22 - 29 mmol/L Final     Anion Gap 04/14/2023 13  7 - 15 mmol/L Final     Urea Nitrogen 04/14/2023 36.1 (H)  8.0 - 23.0 mg/dL Final     Creatinine 04/14/2023 1.89 (H)  0.67 - 1.17 mg/dL Final     Calcium 04/14/2023 9.5  8.8 - 10.2 mg/dL Final     Glucose 04/14/2023 88  70 - 99 mg/dL Final     GFR Estimate 04/14/2023 37 (L)  >60 mL/min/1.73m2 Final    eGFR calculated using 2021 CKD-EPI equation.     WBC Count 04/14/2023 6.8  4.0 - 11.0 10e3/uL Final     RBC Count 04/14/2023 3.10 (L)  4.40 - 5.90 10e6/uL Final     Hemoglobin 04/14/2023 8.5 (L)  13.3 - 17.7 g/dL Final     Hematocrit 04/14/2023 29.1 (L)  40.0 - 53.0 % Final     MCV 04/14/2023 94  78 - 100 fL Final     MCH 04/14/2023 27.4  26.5 - 33.0 pg Final     MCHC 04/14/2023 29.2 (L)  31.5 - 36.5 g/dL Final     RDW 04/14/2023 18.5 (H)  10.0 - 15.0 % Final     Platelet Count 04/14/2023 175  150 - 450 10e3/uL Final       Assessment/Plan:       Frequent falls  C5-6 traumatic injury to disc space with anterior ligamentous injury s/p ACDF 4/7/23  Physical deconditioning  Impaired mobility and ADLs  As above, sustained ground level fall with associated head trauma and neck pain. MRI confirmed above injury, pt subsequently underwent surgery as noted above per neurosurgical team.  *Pt with frequent falls over preceding months, described as drop attacks, occurring without warning and while ambulating. Described as his legs suddenly going weak and he falls to the  ground. No lightheadedness or shortness of breath, no prodrome. Patient does admit to chronic intermittent dizziness but does not appear to coincide with falls. Falls are both witnessed and unwitnessed, pt is not confused immediately following and can recall the event. No loss of bladder function. Today pt reporting ongoing right shoulder discomfort, open to trialing increased gabapentin as alternative agents have not been helpful.  -Pain control with tylenol 1000mg TID PRN, aspercreme 4% patch daily, robaxin 500mg TID PRN, Oxy 2.5mg q6h PRN x2 week supply  -Increase gabapentin to 400mg BID  -C-collar when out of bed, ok to remove in bed  -PT/OT  -No lifting >10#, no driving  -Follow-up with neurosurgery as directed, scheduled 4/21  -Follow with neurology as previously scheduled regarding generalized weakness, scheduled 9/18/23  -TSH, Vitamin B12, Vitamin D this week  -Consider outpatient cardiac monitoring, such as 14-day zio patch or prolonged holter monitor, to rule-out cardiac etiology of falls     Dysphagia  Followed by SLP while inpatient due to concerns of aspiration, discharged on DDL2 with mildly thickened liquids. Suspected acute and related to above. Has continued to follow with SLP at TCU, recently recommended to undergo repeat VSS.  -Video swallow study, to be scheduled  -Continue following with SLP  -Continues with thickened liquids until cleared by SLP     Afib, permanent  Hx NSVT  On chronic AC with warfarin, resumed on 4/11. Pt with episodes of asymptomatic NSVT while inpatient 4/9 not requiring intervention. Unclear etiology, felt to be related to multiple electrolyte imbalances. INR 1.5 today, 4/19. Previously on warfarin 6mg daily  -Continues on metoprolol 50mg daily  -Increase warfarin to 7.5mg daily, recheck INR 4/20  -Monitor electrolyte values     HTN / HLD  Pulmonary HTN  HFpEF  CAD s/p PCI x3  Follows with  cardiology, most recent visit 3/20/23. Pt has had PCI x3, most recently to [facility]  on 1/30/2018. Most recent TTE 10/2022 with LVEF 55-60%, no WMAs. Underwent RHC on 2/17/23 due to persistent LE edema resistant to diuretics with findings of mild pulmonary HTN. Pt's torsemide was increased to 60mg BID and added metolazone 2.5mg daily PRN for weight greater than 205#. Diuretics were held at discharge due to SUDEEP and dehydration. Weight today reportedly 218, although unreliable as have ranged 212--200--218. Overall suspect volume overload with crackles on exam and worsening BLE edema.  -Continues on ASA, statin, KCl, metoprolol 50mg daily  -Daily weights, monitor volume status closely  -Continues with compression stockings to BLE  -Start torsemide 60mg daily 4/18  -BMP on 4/20  -Follow up with Cardiology as scheduled 5/16/23 (labs 5/9/23)     SUDEEP on CKD-3  Hypokalemia, resolved  Hypernatremia, resolved  Cardiorenal syndrome  Follows with  nephrology, last visit 3/29/23. SUDEEP to value 3.5 while inpatient, baseline Cr has been worsening since 2011 due to recurrent SUDEEP with range of 1.1-2.9 per nephrology documentation. Etiology of electrolyte abnormalities likely 2/2 dehydration with overdiuresis. Diuretics held at discharge. Cr 1.58--1.89, likely ongoing prerenal origin given poor po intake while at TCU due to dysphagia. Electrolytes wnl on follow-up labs. 4/18 with evidence of volume overload, diuretics resumed as above at lower dose.  -Encourage PO intake  -Continues on KCl 20mEq daily  -Torsemide 60mg daily started 4/18 as noted above  -Repeat BMP on 4/20 to monitor renal function with resuming diuretic  -Follow with nephrology as directed     Chronic intermittent hypercalcemia  Primary hyperparathyroidism  Calcium improved with IV hydration while inpatient, calcium supplementation discontinued. If hypercalcemia persists, plan to start cinacalcet per nephrology. Follow-up value wnl at 9.5.  -Monitor Calcium values  -Holding all calcium supplements  -Follows with nephrology     Anemia, iron  deficiency  Baseline Hgb ~9. On AC with warfarin as above. No evidence of active bleeding. Follow-up CBC with Hgb 8.5.  -Monitor for bleeding  -Continue ferrous sulfate 3x weekly  -Continue folic acid 2mg daily  -Repeat CBC this week (4/17 labs missed)     RA, seronegative  Followed by rheumatology through , last visit 3/21/23. Severe associated pain, minimally improved with trial of prednisone. Affecting multiple joints, knees worst. Has been on ARAVA/RTX infusions which have been discontinued due to presumed lung injury.  -Continues on allopurinol 300mg daily  -Monitor pain complaints  -Follow up with rheumatology as scheduled 7/10/23     Chronic mucopurulent bronchitis-COPD  Untreated MARYELLEN with hypercarbia  Follows with pulmonology. Chronic and stable. Does not use CPAP/BiPAP.  -Continue trelegy ellipta daily, albuterol inhaler PRN     Hx lumbar MSSA and staph epi wound infection following TLIF in 08/2022  Completed treatment, followed by ID. On chronic suppression with doxycycline.  -Continue doxycycline 100mg BID  -Follow with ID as scheduled     Insomnia  -Continue trazodone 100mg at bedtime      Depression/CHASE  Chronic, stable.  -Continue fluoxetine 40mg daily, bupropion 150mg daily     GERD  Chronic, stable.  -Continue omeprazole 20mg daily, carafate 1g at bedtime     Malnutrition, moderate, acute  Pt scoring 5 on MNA and has had intake <75% of estimated needs over past >7d, mild loss of subcutaneous fat, muscle mass and mild amount of fluid accumulation indicating moderate malnutrition.  -RD following       MED REC REQUIRED  Post Medication Reconciliation Status: medication reconcilation previously completed during another office visit      Orders:  -Increase gabapentin to 400mg BID  -Start torsemide 60mg daily  -BMP on 4/20 dx CKD  -VSS scheduling  -Warfarin 7.5mg daily  -INR 4/20    Electronically signed by: Louie Stanley PA-C             Sincerely,        Louie Stanley PA-C

## 2023-04-18 NOTE — PROGRESS NOTES
"[facility] GERIATRICS    Chief Complaint   Patient presents with     RECHECK     HPI:  Noe Mason is a 73 year old  (1950), who is being seen today for an episodic care visit at: [facility] ([facility]) [[id number]].     Summary from prior provider: 72 y.o. male with PMHx significant for HTN, HFpEF, CAD s/p PCI, afib on Warfarin, CKD, DMII who presented to [facility] ED on 4/2/23 after a ground level fall. Patient reports that in the morning of 4/2 he was walking through the house when he lost his balance and fell backward. Reports landing on his back and striking his head, causing his neck to hyperflex. He was told he had LOC by the EMS crew and was unable to get up on his own. He noted pain to his head, neck and rest of his back. No nausea, vomiting, blurred/loss of vision, new focal weakness, numbness or tingling. No chest pain, abdominal pain. In ED, he described pain to the back of his head, neck and throughout his back. He also had hip pain which he attributed to laying on the wood floor after his fall. He noted several falls in recent past, including one 3 weeks ago in a restaurant. He last took his warfarin morning of 4/2. He has an appointment with neurology for evaluation of \"weakness in all 4 extremities, jerking movements/tremors worsening, more frequent falls\". ED work up was significant for the following injuries: -- widening of C5-6 disc space with prevertebral soft tissue edema.     Patient was admitted from 4/2 - 4/11, 2023. He was seen in consultation with neurosurgery, endocrine, and nephrology. Hospitalization was complicated by SUDEEP, single episode of NSVT related to multiple electrolyte abnormalities including hypercalcemia, hypokalemia, hypophosphatemia, hypernatremia. Calcium supplement was discontinued at discharge and pt supported for dehydration. At time of discharge, electrolyte abnormalities had resolved.    Patient is seen today in follow-up. He is sitting up in recliner at " "bedside, reports he is overall doing well. Continues to experience significant pain in his right shoulder which is most bothersome at night. Current agents are not helping a great deal, do take the edge off but are otherwise ineffective regarding the pain. Also reports numbness/tingling in bilateral lower extremities - which he admits is more chronic in nature. Further notes his legs are starting to be more edematous than usual lately, and his breathing has become more labored during therapies. He has been able to participate in therapies to full extent but feels he is more fatigued after sessions. Does have mild sob with exertion, does not interfere with activity but he does need to stop and catch his breath more frequently over the past few days. Notes his weight today was reported at 218#, weights have been somewhat erratic but generally appear to be near 210# range, he reports dry weight is closer to 200. No chest pain. No constipation. No fever, chills, cough. Continues on thickened liquid diet, video swallow has not yet been scheduled. Labs scheduled for 4/17 were missed.    Allergies, and PMH/PSH reviewed in EPIC today.  REVIEW OF SYSTEMS:  4 point ROS including Respiratory, CV, GI and , other than that noted in the HPI,  is negative    Objective:   /75   Pulse 68   Temp 97  F (36.1  C)   Resp 18   Ht 1.727 m (5' 8\")   Wt 91 kg (200 lb 11.2 oz)   SpO2 96%   BMI 30.52 kg/m    GEN: well-developed, well-nourished, appears comfortable  HEENT: NCAT, EOM intact bilaterally, sclera clear, conjunctiva normal, nose & mouth patent, mucous membranes moist  NECK: C-collar in place, nontender to spinous processes, incision open to air, c/d/i  CHEST: lungs with crackles in bilateral bases, no increased work of breathing, no wheeze  HEART: RRR, S1 & S2  ABD: soft, nontender, nondistended, no guarding or rigidity, +BS in all 4 quadrants  MSK: AROM bilateral UE/LE  NEURO: awake, alert, oriented to name, place, " and time. CN II-XII grossly intact. Sensation grossly intact to light touch.   SKIN: warm & dry without rash, 3+ pitting pedal edema bilaterally    Lab Requisition on 04/14/2023   Component Date Value Ref Range Status     Sodium 04/14/2023 144  136 - 145 mmol/L Final     Potassium 04/14/2023 3.6  3.4 - 5.3 mmol/L Final     Chloride 04/14/2023 101  98 - 107 mmol/L Final     Carbon Dioxide (CO2) 04/14/2023 30 (H)  22 - 29 mmol/L Final     Anion Gap 04/14/2023 13  7 - 15 mmol/L Final     Urea Nitrogen 04/14/2023 36.1 (H)  8.0 - 23.0 mg/dL Final     Creatinine 04/14/2023 1.89 (H)  0.67 - 1.17 mg/dL Final     Calcium 04/14/2023 9.5  8.8 - 10.2 mg/dL Final     Glucose 04/14/2023 88  70 - 99 mg/dL Final     GFR Estimate 04/14/2023 37 (L)  >60 mL/min/1.73m2 Final    eGFR calculated using 2021 CKD-EPI equation.     WBC Count 04/14/2023 6.8  4.0 - 11.0 10e3/uL Final     RBC Count 04/14/2023 3.10 (L)  4.40 - 5.90 10e6/uL Final     Hemoglobin 04/14/2023 8.5 (L)  13.3 - 17.7 g/dL Final     Hematocrit 04/14/2023 29.1 (L)  40.0 - 53.0 % Final     MCV 04/14/2023 94  78 - 100 fL Final     MCH 04/14/2023 27.4  26.5 - 33.0 pg Final     MCHC 04/14/2023 29.2 (L)  31.5 - 36.5 g/dL Final     RDW 04/14/2023 18.5 (H)  10.0 - 15.0 % Final     Platelet Count 04/14/2023 175  150 - 450 10e3/uL Final       Assessment/Plan:       Frequent falls  C5-6 traumatic injury to disc space with anterior ligamentous injury s/p ACDF 4/7/23  Physical deconditioning  Impaired mobility and ADLs  As above, sustained ground level fall with associated head trauma and neck pain. MRI confirmed above injury, pt subsequently underwent surgery as noted above per neurosurgical team.  *Pt with frequent falls over preceding months, described as drop attacks, occurring without warning and while ambulating. Described as his legs suddenly going weak and he falls to the ground. No lightheadedness or shortness of breath, no prodrome. Patient does admit to chronic intermittent  dizziness but does not appear to coincide with falls. Falls are both witnessed and unwitnessed, pt is not confused immediately following and can recall the event. No loss of bladder function. Today pt reporting ongoing right shoulder discomfort, open to trialing increased gabapentin as alternative agents have not been helpful.  -Pain control with tylenol 1000mg TID PRN, aspercreme 4% patch daily, robaxin 500mg TID PRN, Oxy 2.5mg q6h PRN x2 week supply  -Increase gabapentin to 400mg BID  -C-collar when out of bed, ok to remove in bed  -PT/OT  -No lifting >10#, no driving  -Follow-up with neurosurgery as directed, scheduled 4/21  -Follow with neurology as previously scheduled regarding generalized weakness, scheduled 9/18/23  -TSH, Vitamin B12, Vitamin D this week  -Consider outpatient cardiac monitoring, such as 14-day zio patch or prolonged holter monitor, to rule-out cardiac etiology of falls     Dysphagia  Followed by SLP while inpatient due to concerns of aspiration, discharged on DDL2 with mildly thickened liquids. Suspected acute and related to above. Has continued to follow with SLP at TCU, recently recommended to undergo repeat VSS.  -Video swallow study, to be scheduled  -Continue following with SLP  -Continues with thickened liquids until cleared by SLP     Afib, permanent  Hx NSVT  On chronic AC with warfarin, resumed on 4/11. Pt with episodes of asymptomatic NSVT while inpatient 4/9 not requiring intervention. Unclear etiology, felt to be related to multiple electrolyte imbalances. INR 1.5 today, 4/19. Previously on warfarin 6mg daily  -Continues on metoprolol 50mg daily  -Increase warfarin to 7.5mg daily, recheck INR 4/20  -Monitor electrolyte values     HTN / HLD  Pulmonary HTN  HFpEF  CAD s/p PCI x3  Follows with  cardiology, most recent visit 3/20/23. Pt has had PCI x3, most recently to [facility] on 1/30/2018. Most recent TTE 10/2022 with LVEF 55-60%, no WMAs. Underwent RHC on 2/17/23 due to  persistent LE edema resistant to diuretics with findings of mild pulmonary HTN. Pt's torsemide was increased to 60mg BID and added metolazone 2.5mg daily PRN for weight greater than 205#. Diuretics were held at discharge due to SUDEEP and dehydration. Weight today reportedly 218, although unreliable as have ranged 212--200--218. Overall suspect volume overload with crackles on exam and worsening BLE edema.  -Continues on ASA, statin, KCl, metoprolol 50mg daily  -Daily weights, monitor volume status closely  -Continues with compression stockings to BLE  -Start torsemide 60mg daily 4/18  -BMP on 4/20  -Follow up with Cardiology as scheduled 5/16/23 (labs 5/9/23)     SUDEEP on CKD-3  Hypokalemia, resolved  Hypernatremia, resolved  Cardiorenal syndrome  Follows with  nephrology, last visit 3/29/23. SUDEEP to value 3.5 while inpatient, baseline Cr has been worsening since 2011 due to recurrent SUDEEP with range of 1.1-2.9 per nephrology documentation. Etiology of electrolyte abnormalities likely 2/2 dehydration with overdiuresis. Diuretics held at discharge. Cr 1.58--1.89, likely ongoing prerenal origin given poor po intake while at TCU due to dysphagia. Electrolytes wnl on follow-up labs. 4/18 with evidence of volume overload, diuretics resumed as above at lower dose.  -Encourage PO intake  -Continues on KCl 20mEq daily  -Torsemide 60mg daily started 4/18 as noted above  -Repeat BMP on 4/20 to monitor renal function with resuming diuretic  -Follow with nephrology as directed     Chronic intermittent hypercalcemia  Primary hyperparathyroidism  Calcium improved with IV hydration while inpatient, calcium supplementation discontinued. If hypercalcemia persists, plan to start cinacalcet per nephrology. Follow-up value wnl at 9.5.  -Monitor Calcium values  -Holding all calcium supplements  -Follows with nephrology     Anemia, iron deficiency  Baseline Hgb ~9. On AC with warfarin as above. No evidence of active bleeding. Follow-up CBC  with Hgb 8.5.  -Monitor for bleeding  -Continue ferrous sulfate 3x weekly  -Continue folic acid 2mg daily  -Repeat CBC this week (4/17 labs missed)     RA, seronegative  Followed by rheumatology through , last visit 3/21/23. Severe associated pain, minimally improved with trial of prednisone. Affecting multiple joints, knees worst. Has been on ARAVA/RTX infusions which have been discontinued due to presumed lung injury.  -Continues on allopurinol 300mg daily  -Monitor pain complaints  -Follow up with rheumatology as scheduled 7/10/23     Chronic mucopurulent bronchitis-COPD  Untreated MARYELLEN with hypercarbia  Follows with pulmonology. Chronic and stable. Does not use CPAP/BiPAP.  -Continue trelegy ellipta daily, albuterol inhaler PRN     Hx lumbar MSSA and staph epi wound infection following TLIF in 08/2022  Completed treatment, followed by ID. On chronic suppression with doxycycline.  -Continue doxycycline 100mg BID  -Follow with ID as scheduled     Insomnia  -Continue trazodone 100mg at bedtime      Depression/CHASE  Chronic, stable.  -Continue fluoxetine 40mg daily, bupropion 150mg daily     GERD  Chronic, stable.  -Continue omeprazole 20mg daily, carafate 1g at bedtime     Malnutrition, moderate, acute  Pt scoring 5 on MNA and has had intake <75% of estimated needs over past >7d, mild loss of subcutaneous fat, muscle mass and mild amount of fluid accumulation indicating moderate malnutrition.  -RD following       MED REC REQUIRED  Post Medication Reconciliation Status: medication reconcilation previously completed during another office visit      Orders:  -Increase gabapentin to 400mg BID  -Start torsemide 60mg daily  -BMP on 4/20 dx CKD  -VSS scheduling  -Warfarin 7.5mg daily  -INR 4/20    Electronically signed by: Louie Stanley PA-C

## 2023-04-19 ENCOUNTER — LAB REQUISITION (OUTPATIENT)
Dept: LAB | Facility: CLINIC | Age: 73
End: 2023-04-19
Payer: MEDICARE

## 2023-04-19 DIAGNOSIS — S13.4XXD SPRAIN OF LIGAMENTS OF CERVICAL SPINE, SUBSEQUENT ENCOUNTER: ICD-10-CM

## 2023-04-19 LAB
ANION GAP SERPL CALCULATED.3IONS-SCNC: 10 MMOL/L (ref 7–15)
BUN SERPL-MCNC: 27.1 MG/DL (ref 8–23)
CALCIUM SERPL-MCNC: 9.9 MG/DL (ref 8.8–10.2)
CHLORIDE SERPL-SCNC: 104 MMOL/L (ref 98–107)
CREAT SERPL-MCNC: 1.8 MG/DL (ref 0.67–1.17)
DEPRECATED CALCIDIOL+CALCIFEROL SERPL-MC: 60 UG/L (ref 20–75)
DEPRECATED HCO3 PLAS-SCNC: 27 MMOL/L (ref 22–29)
ERYTHROCYTE [DISTWIDTH] IN BLOOD BY AUTOMATED COUNT: 18.9 % (ref 10–15)
GFR SERPL CREATININE-BSD FRML MDRD: 39 ML/MIN/1.73M2
GLUCOSE SERPL-MCNC: 93 MG/DL (ref 70–99)
HCT VFR BLD AUTO: 30.3 % (ref 40–53)
HGB BLD-MCNC: 8.7 G/DL (ref 13.3–17.7)
MCH RBC QN AUTO: 26.9 PG (ref 26.5–33)
MCHC RBC AUTO-ENTMCNC: 28.7 G/DL (ref 31.5–36.5)
MCV RBC AUTO: 94 FL (ref 78–100)
PLATELET # BLD AUTO: 193 10E3/UL (ref 150–450)
POTASSIUM SERPL-SCNC: 4.1 MMOL/L (ref 3.4–5.3)
RBC # BLD AUTO: 3.23 10E6/UL (ref 4.4–5.9)
SODIUM SERPL-SCNC: 141 MMOL/L (ref 136–145)
TSH SERPL DL<=0.005 MIU/L-ACNC: 1.2 UIU/ML (ref 0.3–4.2)
VIT B12 SERPL-MCNC: 524 PG/ML (ref 232–1245)
WBC # BLD AUTO: 6.4 10E3/UL (ref 4–11)

## 2023-04-19 PROCEDURE — 82607 VITAMIN B-12: CPT | Mod: ORL | Performed by: PHYSICIAN ASSISTANT

## 2023-04-19 PROCEDURE — 80048 BASIC METABOLIC PNL TOTAL CA: CPT | Mod: ORL | Performed by: PHYSICIAN ASSISTANT

## 2023-04-19 PROCEDURE — P9604 ONE-WAY ALLOW PRORATED TRIP: HCPCS | Mod: ORL | Performed by: PHYSICIAN ASSISTANT

## 2023-04-19 PROCEDURE — 85027 COMPLETE CBC AUTOMATED: CPT | Mod: ORL | Performed by: PHYSICIAN ASSISTANT

## 2023-04-19 PROCEDURE — 82306 VITAMIN D 25 HYDROXY: CPT | Mod: ORL | Performed by: PHYSICIAN ASSISTANT

## 2023-04-19 PROCEDURE — 84443 ASSAY THYROID STIM HORMONE: CPT | Mod: ORL | Performed by: PHYSICIAN ASSISTANT

## 2023-04-19 PROCEDURE — 36415 COLL VENOUS BLD VENIPUNCTURE: CPT | Mod: ORL | Performed by: PHYSICIAN ASSISTANT

## 2023-04-20 ENCOUNTER — DISCHARGE SUMMARY NURSING HOME (OUTPATIENT)
Dept: GERIATRICS | Facility: CLINIC | Age: 73
End: 2023-04-20
Payer: MEDICARE

## 2023-04-20 VITALS
TEMPERATURE: 96.4 F | RESPIRATION RATE: 16 BRPM | DIASTOLIC BLOOD PRESSURE: 51 MMHG | BODY MASS INDEX: 36.54 KG/M2 | SYSTOLIC BLOOD PRESSURE: 112 MMHG | WEIGHT: 219.3 LBS | OXYGEN SATURATION: 95 % | HEIGHT: 65 IN | HEART RATE: 71 BPM

## 2023-04-20 DIAGNOSIS — Z98.890 H/O CERVICAL SPINE SURGERY: ICD-10-CM

## 2023-04-20 DIAGNOSIS — Z74.09 IMPAIRED MOBILITY AND ACTIVITIES OF DAILY LIVING: ICD-10-CM

## 2023-04-20 DIAGNOSIS — Z86.79 HX OF VENTRICULAR TACHYCARDIA: ICD-10-CM

## 2023-04-20 DIAGNOSIS — R53.81 PHYSICAL DECONDITIONING: ICD-10-CM

## 2023-04-20 DIAGNOSIS — I10 ESSENTIAL HYPERTENSION, BENIGN: ICD-10-CM

## 2023-04-20 DIAGNOSIS — M62.81 GENERALIZED MUSCLE WEAKNESS: ICD-10-CM

## 2023-04-20 DIAGNOSIS — R29.6 FALLS FREQUENTLY: ICD-10-CM

## 2023-04-20 DIAGNOSIS — K21.00 GASTROESOPHAGEAL REFLUX DISEASE WITH ESOPHAGITIS WITHOUT HEMORRHAGE: ICD-10-CM

## 2023-04-20 DIAGNOSIS — S13.4XXD INJURY TO LIGAMENT OF CERVICAL SPINE, SUBSEQUENT ENCOUNTER: ICD-10-CM

## 2023-04-20 DIAGNOSIS — R13.10 DYSPHAGIA, UNSPECIFIED TYPE: ICD-10-CM

## 2023-04-20 DIAGNOSIS — I25.10 CORONARY ARTERY DISEASE INVOLVING NATIVE CORONARY ARTERY OF NATIVE HEART WITHOUT ANGINA PECTORIS: ICD-10-CM

## 2023-04-20 DIAGNOSIS — M46.26 INFECTION OF LUMBAR SPINE (H): ICD-10-CM

## 2023-04-20 DIAGNOSIS — D64.9 ANEMIA, UNSPECIFIED TYPE: ICD-10-CM

## 2023-04-20 DIAGNOSIS — M05.9 SEROPOSITIVE RHEUMATOID ARTHRITIS (H): ICD-10-CM

## 2023-04-20 DIAGNOSIS — Z78.9 IMPAIRED MOBILITY AND ACTIVITIES OF DAILY LIVING: ICD-10-CM

## 2023-04-20 DIAGNOSIS — Z79.01 CHRONIC ANTICOAGULATION: ICD-10-CM

## 2023-04-20 DIAGNOSIS — I50.30 HEART FAILURE WITH PRESERVED EJECTION FRACTION, NYHA CLASS I (H): Primary | ICD-10-CM

## 2023-04-20 DIAGNOSIS — E78.5 HYPERLIPIDEMIA LDL GOAL <130: ICD-10-CM

## 2023-04-20 DIAGNOSIS — N18.30 STAGE 3 CHRONIC KIDNEY DISEASE, UNSPECIFIED WHETHER STAGE 3A OR 3B CKD (H): ICD-10-CM

## 2023-04-20 LAB
ANION GAP SERPL CALCULATED.3IONS-SCNC: 13 MMOL/L (ref 7–15)
BUN SERPL-MCNC: 29.5 MG/DL (ref 8–23)
CALCIUM SERPL-MCNC: 9.9 MG/DL (ref 8.8–10.2)
CHLORIDE SERPL-SCNC: 102 MMOL/L (ref 98–107)
CREAT SERPL-MCNC: 1.85 MG/DL (ref 0.67–1.17)
DEPRECATED HCO3 PLAS-SCNC: 28 MMOL/L (ref 22–29)
GFR SERPL CREATININE-BSD FRML MDRD: 38 ML/MIN/1.73M2
GLUCOSE SERPL-MCNC: 100 MG/DL (ref 70–99)
POTASSIUM SERPL-SCNC: 4.1 MMOL/L (ref 3.4–5.3)
SODIUM SERPL-SCNC: 143 MMOL/L (ref 136–145)

## 2023-04-20 PROCEDURE — P9604 ONE-WAY ALLOW PRORATED TRIP: HCPCS | Mod: ORL | Performed by: FAMILY MEDICINE

## 2023-04-20 PROCEDURE — 99316 NF DSCHRG MGMT 30 MIN+: CPT | Performed by: PHYSICIAN ASSISTANT

## 2023-04-20 PROCEDURE — 80048 BASIC METABOLIC PNL TOTAL CA: CPT | Mod: ORL | Performed by: FAMILY MEDICINE

## 2023-04-20 PROCEDURE — 36415 COLL VENOUS BLD VENIPUNCTURE: CPT | Mod: ORL | Performed by: FAMILY MEDICINE

## 2023-04-20 NOTE — PROGRESS NOTES
"[facility] GERIATRICS DISCHARGE SUMMARY  PATIENT'S NAME: Noe Mason  YOB: 1950  MEDICAL RECORD NUMBER:  [id number]  Place of Service where encounter took place:  [facility] (SNF) [[id number]]    PRIMARY CARE PROVIDER AND CLINIC RESPONSIBLE AFTER TRANSFER:   Dg Anthony MD, [address] [facility]  / [place] [address]    [facility] Provider     Transferring providers: Crystal Wellington, Suzanne Sotelo MD  Recent Hospitalization/ED:  [facility]  stay 4/2/23 to 4/11/23.  Date of SNF Admission: April 11, 2023  Date of SNF (anticipated) Discharge: April 23, 2023  Discharged to: previous independent home  Cognitive Scores: SLUMS: 18/30  Physical Function: Ambulating 243 ft with 2ww and w/c follow sba  DME: No DME needed    CODE STATUS/ADVANCE DIRECTIVES DISCUSSION:  Full Code   ALLERGIES: Atorvastatin, Atenolol, and Lisinopril    NURSING FACILITY COURSE   Summary from prior provider: 72 y.o. male with PMHx significant for HTN, HFpEF, CAD s/p PCI, afib on Warfarin, CKD, DMII who presented to [facility] ED on 4/2/23 after a ground level fall. Patient reports that in the morning of 4/2 he was walking through the house when he lost his balance and fell backward. Reports landing on his back and striking his head, causing his neck to hyperflex. He was told he had LOC by the EMS crew and was unable to get up on his own. He noted pain to his head, neck and rest of his back. No nausea, vomiting, blurred/loss of vision, new focal weakness, numbness or tingling. No chest pain, abdominal pain. In ED, he described pain to the back of his head, neck and throughout his back. He also had hip pain which he attributed to laying on the wood floor after his fall. He noted several falls in recent past, including one 3 weeks ago in a restaurant. He last took his warfarin morning of 4/2. He has an appointment with neurology for evaluation of \"weakness in all 4 extremities, jerking movements/tremors " "worsening, more frequent falls\". ED work up was significant for the following injuries: -- widening of C5-6 disc space with prevertebral soft tissue edema.     Patient was admitted from 4/2 - 4/11, 2023. He was seen in consultation with neurosurgery, endocrine, and nephrology. Hospitalization was complicated by SUDEEP, single episode of NSVT related to multiple electrolyte abnormalities including hypercalcemia, hypokalemia, hypophosphatemia, hypernatremia. Calcium supplement was discontinued at discharge and pt supported for dehydration. At time of discharge, electrolyte abnormalities had resolved.    Summary of nursing facility stay:     Frequent falls  C5-6 traumatic injury to disc space with anterior ligamentous injury s/p ACDF 4/7/23  Physical deconditioning  Impaired mobility and ADLs  As above, sustained ground level fall with associated head trauma and neck pain. MRI confirmed above injury, pt subsequently underwent surgery as noted above per neurosurgical team.  *Pt with frequent falls over preceding months, described as drop attacks, occurring without warning and while ambulating. Described as his legs suddenly going weak and he falls to the ground. No lightheadedness or shortness of breath, no prodrome. Patient does admit to chronic intermittent dizziness but does not appear to coincide with falls. Falls are both witnessed and unwitnessed, pt is not confused immediately following and can recall the event. No loss of bladder function. Today pt reporting ongoing right shoulder discomfort, open to trialing increased gabapentin as alternative agents have not been helpful. TSH, Vit B12 and D wnl.  -Pain control with tylenol 1000mg TID PRN, aspercreme 4% patch daily  -Discontinue robaxin, pt not using  -Continues on Oxy 2.5mg q6h PRN, using sparingly (average of 1x/day), encouraged to wean off at discharge  -Increase gabapentin to 400mg BID  -C-collar when out of bed, ok to remove in bed  -PT/OT  -No lifting >10#, " no driving  -Follow-up with neurosurgery as directed, scheduled 4/21  -Follow with neurology as previously scheduled regarding generalized weakness, scheduled 9/18/23  -Consider outpatient cardiac monitoring, such as 14-day zio patch or prolonged holter monitor, to rule-out cardiac etiology of falls     Dysphagia  Followed by SLP while inpatient due to concerns of aspiration, discharged on DDL2 with mildly thickened liquids. Suspected acute and related to above. Has continued to follow with SLP at TCU, recently recommended to undergo repeat VSS.  -Video swallow study, scheduled for 5/25 at 8:15a  -Continue following with SLP  -Continues with thickened liquids until cleared by SLP     Afib, permanent  Hx NSVT  On chronic AC with warfarin, resumed on 4/11. Pt with episodes of asymptomatic NSVT while inpatient 4/9 not requiring intervention. Unclear etiology, felt to be related to multiple electrolyte imbalances. INR today 1.9 after receiving warfarin 7.5mg daily  -Continues on metoprolol 50mg daily  -Warfarin of 7.5mg Tu/Th/Sa, 5mg AOD  -INR recheck due 4/24     HTN / HLD  Pulmonary HTN  HFpEF, acute on chronic  CAD s/p PCI x3  Follows with  cardiology, most recent visit 3/20/23. Pt has had PCI x3, most recently to mid LCx on 1/30/2018. Most recent TTE 10/2022 with LVEF 55-60%, no WMAs. Underwent RHC on 2/17/23 due to persistent LE edema resistant to diuretics with findings of mild pulmonary HTN. Pt's torsemide was increased to 60mg BID and added metolazone 2.5mg daily PRN for weight greater than 205#. Diuretics were held at discharge due to SUDEEP and dehydration. Weights at TCU are inaccurate, average appears to be near 215#. Clinically with volume overload, BLE edema and crackles in lungs. No hypoxia.  -Continues on ASA, statin, KCl, metoprolol 50mg daily  -Daily weights to continue at home, pt will call [facility] clinic if trending upward  -Continues with compression stockings to BLE  -Torsemide to increase to 60mg qAM,  20mg qPM  -BMP due the week of 4/24  -Follow up with Cardiology as scheduled 5/16/23 (labs 5/9/23)     SUDEEP on CKD-3  Hypokalemia, resolved  Hypernatremia, resolved  Cardiorenal syndrome  Follows with  nephrology, last visit 3/29/23. SUDEEP to value 3.5 while inpatient, baseline Cr has been worsening since 2011 due to recurrent SUDEEP with range of 1.1-2.9 per nephrology documentation. Etiology of electrolyte abnormalities likely 2/2 dehydration with overdiuresis. Diuretics held at discharge. Cr 1.58--1.89, likely ongoing prerenal origin given poor po intake while at TCU due to dysphagia. Electrolytes wnl on follow-up labs. 4/18 with evidence of volume overload, diuretics resumed as above at lower dose. Cr stable at 1.8.  -Continues on KCl 20mEq daily  -Torsemide 60mg/20mg as above  -Monitor renal function with increase in diuretics  -Follow with nephrology as directed     Chronic intermittent hypercalcemia  Primary hyperparathyroidism  Calcium improved with IV hydration while inpatient, calcium supplementation discontinued. If hypercalcemia persists, plan to start cinacalcet per nephrology. Follow-up value wnl at 9.5.  -Monitor Calcium values  -Holding all calcium supplements  -Follows with nephrology     Anemia, iron deficiency  Baseline Hgb ~9. On AC with warfarin as above. No evidence of active bleeding. Follow-up with Hgb 8.5--8.7.  -Monitor for bleeding  -Continue ferrous sulfate 3x weekly  -Continue folic acid 2mg daily     RA, seronegative  Followed by rheumatology through , last visit 3/21/23. Severe associated pain, minimally improved with trial of prednisone. Affecting multiple joints, knees worst. Has been on ARAVA/RTX infusions which have been discontinued due to presumed lung injury.  -Continues on allopurinol 300mg daily  -Monitor pain complaints  -Follow up with rheumatology as scheduled 7/10/23     Chronic mucopurulent bronchitis-COPD  Untreated MARYELLEN with hypercarbia  Follows with pulmonology. Chronic  and stable. Does not use CPAP/BiPAP.  -Continue trelegy ellipta daily, albuterol inhaler PRN     Hx lumbar MSSA and staph epi wound infection following TLIF in 08/2022  Completed treatment, followed by ID. On chronic suppression with doxycycline.  -Continue doxycycline 100mg BID  -Follow with ID as scheduled     Insomnia  -Continue trazodone 100mg at bedtime      Depression/CHASE  Chronic, stable.  -Continue fluoxetine 40mg daily, bupropion 150mg daily     GERD  Chronic, stable.  -Continue omeprazole 20mg daily, carafate 1g at bedtime     Malnutrition, moderate, acute  Pt scoring 5 on MNA and has had intake <75% of estimated needs over past >7d, mild loss of subcutaneous fat, muscle mass and mild amount of fluid accumulation indicating moderate malnutrition.  -RD following    Discharge Medications:  MED REC REQUIRED  Post Medication Reconciliation Status: medication reconcilation previously completed during another office visit     Current Outpatient Medications   Medication Sig Dispense Refill     acetaminophen (TYLENOL) 500 MG tablet Take 2 tablets (1,000 mg) by mouth every 8 hours as needed for mild pain       albuterol (PROAIR HFA/PROVENTIL HFA/VENTOLIN HFA) 108 (90 Base) MCG/ACT inhaler Inhale 2 puffs into the lungs every 4 hours as needed for shortness of breath or wheezing 18 g 0     allopurinol (ZYLOPRIM) 300 MG tablet Take 1 tablet (300 mg) by mouth daily       aspirin (ASA) 81 MG chewable tablet Take 1 tablet (81 mg) by mouth daily       atorvastatin (LIPITOR) 40 MG tablet Take 1 tablet (40 mg) by mouth daily       bisacodyl (DULCOLAX) 10 MG suppository Place 1 suppository (10 mg) rectally daily as needed for constipation       buPROPion (WELLBUTRIN XL) 150 MG 24 hr tablet Take 1 tablet (150 mg) by mouth every morning       doxycycline hyclate (VIBRAMYCIN) 100 MG capsule Take 1 capsule (100 mg) by mouth 2 times daily       ferrous sulfate (FEROSUL) 325 (65 Fe) MG tablet Take 1 tablet (325 mg) by mouth three  times a week       FLUoxetine (PROZAC) 40 MG capsule Take 1 capsule (40 mg) by mouth daily       Fluticasone-Umeclidin-Vilanterol (TRELEGY ELLIPTA) 200-62.5-25 MCG/ACT oral inhaler Inhale 1 puff into the lungs daily       folic acid (FOLVITE) 1 MG tablet Take 2 tablets (2 mg) by mouth daily       gabapentin (NEURONTIN) 100 MG capsule Take 2 capsules (200 mg) by mouth 2 times daily       Lidocaine (LIDOCARE) 4 % Patch Place 1 patch onto the skin every 24 hours To prevent lidocaine toxicity, patient should be patch free for 12 hrs daily.       methocarbamol (ROBAXIN) 500 MG tablet Take 1 tablet (500 mg) by mouth 3 times daily as needed for muscle spasms       metoprolol succinate ER (TOPROL XL) 50 MG 24 hr tablet Take 1 tablet (50 mg) by mouth daily       nystatin (MYCOSTATIN) 569662 UNIT/GM external powder Apply topically 2 times daily as needed       omeprazole (PRILOSEC) 20 MG DR capsule Take 1 capsule (20 mg) by mouth daily       oxyCODONE (ROXICODONE) 5 MG tablet Take 0.5 tablets (2.5 mg) by mouth every 6 hours as needed for pain 28 tablet 0     polyethylene glycol (MIRALAX) 17 GM/Dose powder Take 17 g (1 capful.) by mouth daily as needed for constipation       potassium chloride ER (KLOR-CON M) 20 MEQ CR tablet Take 1 tablet (20 mEq) by mouth daily 30 tablet 0     senna-docusate (SENOKOT-S/PERICOLACE) 8.6-50 MG tablet Take 2 tablets by mouth 2 times daily as needed for constipation       sucralfate (CARAFATE) 1 GM tablet Take 1 tablet (1 g) by mouth At Bedtime       traZODone (DESYREL) 100 MG tablet Take 1 tablet (100 mg) by mouth At Bedtime       vitamin D3 (CHOLECALCIFEROL) 50 mcg (2000 units) tablet Take 1 tablet (50 mcg) by mouth daily       warfarin ANTICOAGULANT (COUMADIN) 5 MG tablet Take 1 tablet (5 mg) by mouth daily         Controlled medications:   Medication: oxycodone , 48 tabs given to patient at the time of discharge to take home     Past Medical History:   Past Medical History:   Diagnosis Date  "    ACQ ESOPHAG DIVERTICULUM 5/12/2006     Depressive disorder, not elsewhere classified      Displacement of lumbar intervertebral disc without myelopathy      ESOPHAGEAL REFLUX 5/12/2006    Based on esophagram 5/12/06     Essential hypertension, benign 2/10/2006     Obesity, unspecified      Other and unspecified hyperlipidemia 2/10/2006     Unspecified sleep apnea     Has not done well w/ CPAP     Physical Exam:   Vitals: /51   Pulse 71   Temp (!) 96.4  F (35.8  C)   Resp 16   Ht 1.651 m (5' 5\")   Wt 99.5 kg (219 lb 4.8 oz)   SpO2 95%   BMI 36.49 kg/m    BMI: Body mass index is 36.49 kg/m .    GEN: well-developed, well-nourished, appears comfortable  HEENT: NCAT, EOM intact bilaterally, sclera clear, conjunctiva normal, nose & mouth patent, mucous membranes moist  NECK: C-collar in place, nontender to spinous processes, incision open to air, c/d/i  CHEST: lungs with fine crackles in bilateral bases, no increased work of breathing, no wheeze  HEART: RRR, S1 & S2  ABD: soft, nontender, nondistended, no guarding or rigidity, +BS in all 4 quadrants  MSK: AROM bilateral UE/LE  NEURO: awake, alert, oriented to name, place, and time. CN II-XII grossly intact. Sensation grossly intact to light touch.   SKIN: warm & dry without rash, 2-3+ pitting pedal edema bilaterally extending to knees    SNF labs:   Most Recent 3 CBC's:Recent Labs   Lab Test 04/19/23  0638 04/14/23  0556 04/12/23  0654   WBC 6.4 6.8 6.4   HGB 8.7* 8.5* 9.7*   MCV 94 94 94    175 197     Most Recent 3 BMP's:Recent Labs   Lab Test 04/20/23  0600 04/19/23  0638 04/14/23  0556    141 144   POTASSIUM 4.1 4.1 3.6   CHLORIDE 102 104 101   CO2 28 27 30*   BUN 29.5* 27.1* 36.1*   CR 1.85* 1.80* 1.89*   ANIONGAP 13 10 13   TAWNYA 9.9 9.9 9.5   * 93 88     Most Recent TSH and T4:Recent Labs   Lab Test 04/19/23  0638   TSH 1.20       DISCHARGE PLAN:    Follow up labs: INR due 4/24 to be drawn by home care with results to INR clinic. " BMP due the week of 4/24.    Medical Follow Up:      Follow up with primary care provider in 2-3 weeks  Follow up with specialist as detailed above     Discharge Services: Home Care:  Occupational Therapy, Physical Therapy, Registered Nurse and Home Health Aide    Discharge Instructions Verbalized to Patient at Discharge:     Weigh yourself daily in the morning and keep a record. Call your primary clinic: a) if you are more short of breath, or b) if your weight changes more than 3 pounds in one day or more than 5 pounds in one week.     Incision may be kept open to air.     No lifting more than 10 pounds until approved by surgeon.     TOTAL DISCHARGE TIME:   Greater than 30 minutes  Electronically signed by:  Crystal Wellington       Documentation of Face to Face and Certification for Home Health Services    I certify that services are/were furnished while this patient was under the care of a physician and that a physician or an allowed non-physician practitioner (NPP), had a face-to-face encounter that meets the physician face-to-face encounter requirements. The encounter was in whole, or in part, related to the primary reason for home health. The patient is confined to his/her home and needs intermittent skilled nursing, physical therapy, speech-language pathology, or the continued need for occupational therapy. A plan of care has been established by a physician and is periodically reviewed by a physician.  Date of Face-to-Face Encounter: 4/20/2023.    I certify that, based on my findings, the following services are medically necessary home health services: Nursing, Occupational Therapy and Physical Therapy.    My clinical findings support the need for the above skilled services because: Requires assistance of another person or specialized equipment to access medical services because patient: Requires supervision of another for safe transfer...    Patient to re-establish plan of care with their PCP within 7-10 days  after leaving the facility to reestablish care.  Medicare certified PECOS provider: Louie Stanley PA-C  Date: April 21, 2023

## 2023-04-20 NOTE — LETTER
4/20/2023        RE: Noe Mason  [address]  [place] [address]        [facility] GERIATRICS DISCHARGE SUMMARY  PATIENT'S NAME: Noe Mason  YOB: 1950  MEDICAL RECORD NUMBER:  [id number]  Place of Service where encounter took place:  [facility] (SNF) [[id number]]    PRIMARY CARE PROVIDER AND CLINIC RESPONSIBLE AFTER TRANSFER:   Dg Anthony MD, [address] [facility]  / [place] [address]    [facility] Provider     Transferring providers: Crystal Wellington, Suzanne Sotelo MD  Recent Hospitalization/ED:  \[facility] [place] [facility]\"" Hospital  stay 4/2/23 to 4/11/23.  Date of SNF Admission: April 11, 2023  Date of SNF (anticipated) Discharge: April 23, 2023  Discharged to: previous independent home  Cognitive Scores: SLUMS: 18/30  Physical Function: Ambulating 243 ft with 2ww and w/c follow sba  DME: No DME needed    CODE STATUS/ADVANCE DIRECTIVES DISCUSSION:  Full Code   ALLERGIES: Atorvastatin, Atenolol, and Lisinopril    NURSING FACILITY COURSE   Summary from prior provider: 72 y.o. male with PMHx significant for HTN, HFpEF, CAD s/p PCI, afib on Warfarin, CKD, DMII who presented to [facility] ED on 4/2/23 after a ground level fall. Patient reports that in the morning of 4/2 he was walking through the house when he lost his balance and fell backward. Reports landing on his back and striking his head, causing his neck to hyperflex. He was told he had LOC by the EMS crew and was unable to get up on his own. He noted pain to his head, neck and rest of his back. No nausea, vomiting, blurred/loss of vision, new focal weakness, numbness or tingling. No chest pain, abdominal pain. In ED, he described pain to the back of his head, neck and throughout his back. He also had hip pain which he attributed to laying on the wood floor after his fall. He noted several falls in recent past, including one 3 weeks ago in a restaurant. He last took his warfarin morning of 4/2. He has an  "appointment with neurology for evaluation of \"weakness in all 4 extremities, jerking movements/tremors worsening, more frequent falls\". ED work up was significant for the following injuries: -- widening of C5-6 disc space with prevertebral soft tissue edema.     Patient was admitted from 4/2 - 4/11, 2023. He was seen in consultation with neurosurgery, endocrine, and nephrology. Hospitalization was complicated by SUDEEP, single episode of NSVT related to multiple electrolyte abnormalities including hypercalcemia, hypokalemia, hypophosphatemia, hypernatremia. Calcium supplement was discontinued at discharge and pt supported for dehydration. At time of discharge, electrolyte abnormalities had resolved.    Summary of nursing facility stay:     Frequent falls  C5-6 traumatic injury to disc space with anterior ligamentous injury s/p ACDF 4/7/23  Physical deconditioning  Impaired mobility and ADLs  As above, sustained ground level fall with associated head trauma and neck pain. MRI confirmed above injury, pt subsequently underwent surgery as noted above per neurosurgical team.  *Pt with frequent falls over preceding months, described as drop attacks, occurring without warning and while ambulating. Described as his legs suddenly going weak and he falls to the ground. No lightheadedness or shortness of breath, no prodrome. Patient does admit to chronic intermittent dizziness but does not appear to coincide with falls. Falls are both witnessed and unwitnessed, pt is not confused immediately following and can recall the event. No loss of bladder function. Today pt reporting ongoing right shoulder discomfort, open to trialing increased gabapentin as alternative agents have not been helpful. TSH, Vit B12 and D wnl.  -Pain control with tylenol 1000mg TID PRN, aspercreme 4% patch daily  -Discontinue robaxin, pt not using  -Continues on Oxy 2.5mg q6h PRN, using sparingly (average of 1x/day), encouraged to wean off at " discharge  -Increase gabapentin to 400mg BID  -C-collar when out of bed, ok to remove in bed  -PT/OT  -No lifting >10#, no driving  -Follow-up with neurosurgery as directed, scheduled 4/21  -Follow with neurology as previously scheduled regarding generalized weakness, scheduled 9/18/23  -Consider outpatient cardiac monitoring, such as 14-day zio patch or prolonged holter monitor, to rule-out cardiac etiology of falls     Dysphagia  Followed by SLP while inpatient due to concerns of aspiration, discharged on DDL2 with mildly thickened liquids. Suspected acute and related to above. Has continued to follow with SLP at TCU, recently recommended to undergo repeat VSS.  -Video swallow study, scheduled for 5/25 at 8:15a  -Continue following with SLP  -Continues with thickened liquids until cleared by SLP     Afib, permanent  Hx NSVT  On chronic AC with warfarin, resumed on 4/11. Pt with episodes of asymptomatic NSVT while inpatient 4/9 not requiring intervention. Unclear etiology, felt to be related to multiple electrolyte imbalances. INR today 1.9 after receiving warfarin 7.5mg daily  -Continues on metoprolol 50mg daily  -Warfarin of 7.5mg Tu/Th/Sa, 5mg AOD  -INR recheck due 4/24     HTN / HLD  Pulmonary HTN  HFpEF, acute on chronic  CAD s/p PCI x3  Follows with  cardiology, most recent visit 3/20/23. Pt has had PCI x3, most recently to mid LCx on 1/30/2018. Most recent TTE 10/2022 with LVEF 55-60%, no WMAs. Underwent RHC on 2/17/23 due to persistent LE edema resistant to diuretics with findings of mild pulmonary HTN. Pt's torsemide was increased to 60mg BID and added metolazone 2.5mg daily PRN for weight greater than 205#. Diuretics were held at discharge due to SUDEEP and dehydration. Weights at TCU are inaccurate, average appears to be near 215#. Clinically with volume overload, BLE edema and crackles in lungs. No hypoxia.  -Continues on ASA, statin, KCl, metoprolol 50mg daily  -Daily weights to continue at home, pt  will call [facility] clinic if trending upward  -Continues with compression stockings to BLE  -Torsemide to increase to 60mg qAM, 20mg qPM  -BMP due the week of 4/24  -Follow up with Cardiology as scheduled 5/16/23 (labs 5/9/23)     SUDEEP on CKD-3  Hypokalemia, resolved  Hypernatremia, resolved  Cardiorenal syndrome  Follows with  nephrology, last visit 3/29/23. SUDEEP to value 3.5 while inpatient, baseline Cr has been worsening since 2011 due to recurrent SUDEEP with range of 1.1-2.9 per nephrology documentation. Etiology of electrolyte abnormalities likely 2/2 dehydration with overdiuresis. Diuretics held at discharge. Cr 1.58--1.89, likely ongoing prerenal origin given poor po intake while at TCU due to dysphagia. Electrolytes wnl on follow-up labs. 4/18 with evidence of volume overload, diuretics resumed as above at lower dose. Cr stable at 1.8.  -Continues on KCl 20mEq daily  -Torsemide 60mg/20mg as above  -Monitor renal function with increase in diuretics  -Follow with nephrology as directed     Chronic intermittent hypercalcemia  Primary hyperparathyroidism  Calcium improved with IV hydration while inpatient, calcium supplementation discontinued. If hypercalcemia persists, plan to start cinacalcet per nephrology. Follow-up value wnl at 9.5.  -Monitor Calcium values  -Holding all calcium supplements  -Follows with nephrology     Anemia, iron deficiency  Baseline Hgb ~9. On AC with warfarin as above. No evidence of active bleeding. Follow-up with Hgb 8.5--8.7.  -Monitor for bleeding  -Continue ferrous sulfate 3x weekly  -Continue folic acid 2mg daily     RA, seronegative  Followed by rheumatology through , last visit 3/21/23. Severe associated pain, minimally improved with trial of prednisone. Affecting multiple joints, knees worst. Has been on ARAVA/RTX infusions which have been discontinued due to presumed lung injury.  -Continues on allopurinol 300mg daily  -Monitor pain complaints  -Follow up with rheumatology as  scheduled 7/10/23     Chronic mucopurulent bronchitis-COPD  Untreated MARYELLEN with hypercarbia  Follows with pulmonology. Chronic and stable. Does not use CPAP/BiPAP.  -Continue trelegy ellipta daily, albuterol inhaler PRN     Hx lumbar MSSA and staph epi wound infection following TLIF in 08/2022  Completed treatment, followed by ID. On chronic suppression with doxycycline.  -Continue doxycycline 100mg BID  -Follow with ID as scheduled     Insomnia  -Continue trazodone 100mg at bedtime      Depression/CHASE  Chronic, stable.  -Continue fluoxetine 40mg daily, bupropion 150mg daily     GERD  Chronic, stable.  -Continue omeprazole 20mg daily, carafate 1g at bedtime     Malnutrition, moderate, acute  Pt scoring 5 on MNA and has had intake <75% of estimated needs over past >7d, mild loss of subcutaneous fat, muscle mass and mild amount of fluid accumulation indicating moderate malnutrition.  -RD following    Discharge Medications:  MED REC REQUIRED  Post Medication Reconciliation Status: medication reconcilation previously completed during another office visit     Current Outpatient Medications   Medication Sig Dispense Refill     acetaminophen (TYLENOL) 500 MG tablet Take 2 tablets (1,000 mg) by mouth every 8 hours as needed for mild pain       albuterol (PROAIR HFA/PROVENTIL HFA/VENTOLIN HFA) 108 (90 Base) MCG/ACT inhaler Inhale 2 puffs into the lungs every 4 hours as needed for shortness of breath or wheezing 18 g 0     allopurinol (ZYLOPRIM) 300 MG tablet Take 1 tablet (300 mg) by mouth daily       aspirin (ASA) 81 MG chewable tablet Take 1 tablet (81 mg) by mouth daily       atorvastatin (LIPITOR) 40 MG tablet Take 1 tablet (40 mg) by mouth daily       bisacodyl (DULCOLAX) 10 MG suppository Place 1 suppository (10 mg) rectally daily as needed for constipation       buPROPion (WELLBUTRIN XL) 150 MG 24 hr tablet Take 1 tablet (150 mg) by mouth every morning       doxycycline hyclate (VIBRAMYCIN) 100 MG capsule Take 1  capsule (100 mg) by mouth 2 times daily       ferrous sulfate (FEROSUL) 325 (65 Fe) MG tablet Take 1 tablet (325 mg) by mouth three times a week       FLUoxetine (PROZAC) 40 MG capsule Take 1 capsule (40 mg) by mouth daily       Fluticasone-Umeclidin-Vilanterol (TRELEGY ELLIPTA) 200-62.5-25 MCG/ACT oral inhaler Inhale 1 puff into the lungs daily       folic acid (FOLVITE) 1 MG tablet Take 2 tablets (2 mg) by mouth daily       gabapentin (NEURONTIN) 100 MG capsule Take 2 capsules (200 mg) by mouth 2 times daily       Lidocaine (LIDOCARE) 4 % Patch Place 1 patch onto the skin every 24 hours To prevent lidocaine toxicity, patient should be patch free for 12 hrs daily.       methocarbamol (ROBAXIN) 500 MG tablet Take 1 tablet (500 mg) by mouth 3 times daily as needed for muscle spasms       metoprolol succinate ER (TOPROL XL) 50 MG 24 hr tablet Take 1 tablet (50 mg) by mouth daily       nystatin (MYCOSTATIN) 866985 UNIT/GM external powder Apply topically 2 times daily as needed       omeprazole (PRILOSEC) 20 MG DR capsule Take 1 capsule (20 mg) by mouth daily       oxyCODONE (ROXICODONE) 5 MG tablet Take 0.5 tablets (2.5 mg) by mouth every 6 hours as needed for pain 28 tablet 0     polyethylene glycol (MIRALAX) 17 GM/Dose powder Take 17 g (1 capful.) by mouth daily as needed for constipation       potassium chloride ER (KLOR-CON M) 20 MEQ CR tablet Take 1 tablet (20 mEq) by mouth daily 30 tablet 0     senna-docusate (SENOKOT-S/PERICOLACE) 8.6-50 MG tablet Take 2 tablets by mouth 2 times daily as needed for constipation       sucralfate (CARAFATE) 1 GM tablet Take 1 tablet (1 g) by mouth At Bedtime       traZODone (DESYREL) 100 MG tablet Take 1 tablet (100 mg) by mouth At Bedtime       vitamin D3 (CHOLECALCIFEROL) 50 mcg (2000 units) tablet Take 1 tablet (50 mcg) by mouth daily       warfarin ANTICOAGULANT (COUMADIN) 5 MG tablet Take 1 tablet (5 mg) by mouth daily         Controlled medications:   Medication: oxycodone ,  "48 tabs given to patient at the time of discharge to take home     Past Medical History:   Past Medical History:   Diagnosis Date     ACQ ESOPHAG DIVERTICULUM 5/12/2006     Depressive disorder, not elsewhere classified      Displacement of lumbar intervertebral disc without myelopathy      ESOPHAGEAL REFLUX 5/12/2006    Based on esophagram 5/12/06     Essential hypertension, benign 2/10/2006     Obesity, unspecified      Other and unspecified hyperlipidemia 2/10/2006     Unspecified sleep apnea     Has not done well w/ CPAP     Physical Exam:   Vitals: /51   Pulse 71   Temp (!) 96.4  F (35.8  C)   Resp 16   Ht 1.651 m (5' 5\")   Wt 99.5 kg (219 lb 4.8 oz)   SpO2 95%   BMI 36.49 kg/m    BMI: Body mass index is 36.49 kg/m .    GEN: well-developed, well-nourished, appears comfortable  HEENT: NCAT, EOM intact bilaterally, sclera clear, conjunctiva normal, nose & mouth patent, mucous membranes moist  NECK: C-collar in place, nontender to spinous processes, incision open to air, c/d/i  CHEST: lungs with fine crackles in bilateral bases, no increased work of breathing, no wheeze  HEART: RRR, S1 & S2  ABD: soft, nontender, nondistended, no guarding or rigidity, +BS in all 4 quadrants  MSK: AROM bilateral UE/LE  NEURO: awake, alert, oriented to name, place, and time. CN II-XII grossly intact. Sensation grossly intact to light touch.   SKIN: warm & dry without rash, 2-3+ pitting pedal edema bilaterally extending to knees    SNF labs:   Most Recent 3 CBC's:Recent Labs   Lab Test 04/19/23  0638 04/14/23  0556 04/12/23  0654   WBC 6.4 6.8 6.4   HGB 8.7* 8.5* 9.7*   MCV 94 94 94    175 197     Most Recent 3 BMP's:Recent Labs   Lab Test 04/20/23  0600 04/19/23  0638 04/14/23  0556    141 144   POTASSIUM 4.1 4.1 3.6   CHLORIDE 102 104 101   CO2 28 27 30*   BUN 29.5* 27.1* 36.1*   CR 1.85* 1.80* 1.89*   ANIONGAP 13 10 13   TAWNYA 9.9 9.9 9.5   * 93 88     Most Recent TSH and T4:Recent Labs   Lab Test " 04/19/23  0638   TSH 1.20       DISCHARGE PLAN:    Follow up labs: INR due 4/24 to be drawn by home care with results to INR clinic. BMP due the week of 4/24.    Medical Follow Up:      Follow up with primary care provider in 2-3 weeks  Follow up with specialist as detailed above     Discharge Services: Home Care:  Occupational Therapy, Physical Therapy, Registered Nurse and Home Health Aide    Discharge Instructions Verbalized to Patient at Discharge:     Weigh yourself daily in the morning and keep a record. Call your primary clinic: a) if you are more short of breath, or b) if your weight changes more than 3 pounds in one day or more than 5 pounds in one week.     Incision may be kept open to air.     No lifting more than 10 pounds until approved by surgeon.     TOTAL DISCHARGE TIME:   Greater than 30 minutes  Electronically signed by:  Crystal Wellington       Documentation of Face to Face and Certification for Home Health Services    I certify that services are/were furnished while this patient was under the care of a physician and that a physician or an allowed non-physician practitioner (NPP), had a face-to-face encounter that meets the physician face-to-face encounter requirements. The encounter was in whole, or in part, related to the primary reason for home health. The patient is confined to his/her home and needs intermittent skilled nursing, physical therapy, speech-language pathology, or the continued need for occupational therapy. A plan of care has been established by a physician and is periodically reviewed by a physician.  Date of Face-to-Face Encounter: 4/20/2023.    I certify that, based on my findings, the following services are medically necessary home health services: Nursing, Occupational Therapy and Physical Therapy.    My clinical findings support the need for the above skilled services because: Requires assistance of another person or specialized equipment to access medical services because  patient: Requires supervision of another for safe transfer...    Patient to re-establish plan of care with their PCP within 7-10 days after leaving the facility to reestablish care.  Medicare certified PECOS provider: Louie Stanley PA-C  Date: April 21, 2023                      Sincerely,        Louie Stanley PA-C

## 2023-04-21 RX ORDER — TORSEMIDE 20 MG/1
TABLET ORAL
Qty: 180 TABLET | Refills: 1 | Status: SHIPPED | OUTPATIENT
Start: 2023-04-21

## 2023-04-21 RX ORDER — GABAPENTIN 400 MG/1
400 CAPSULE ORAL 2 TIMES DAILY
Qty: 60 CAPSULE | Refills: 1 | Status: SHIPPED | OUTPATIENT
Start: 2023-04-21

## 2023-04-21 RX ORDER — TORSEMIDE 20 MG/1
60 TABLET ORAL DAILY
COMMUNITY
End: 2023-04-21

## 2023-04-21 RX ORDER — LIDOCAINE 4 G/G
1 PATCH TOPICAL EVERY 24 HOURS
Qty: 10 PATCH | Refills: 1 | Status: SHIPPED | OUTPATIENT
Start: 2023-04-21